# Patient Record
Sex: FEMALE | Race: WHITE | NOT HISPANIC OR LATINO | Employment: OTHER | URBAN - METROPOLITAN AREA
[De-identification: names, ages, dates, MRNs, and addresses within clinical notes are randomized per-mention and may not be internally consistent; named-entity substitution may affect disease eponyms.]

---

## 2018-08-02 ENCOUNTER — APPOINTMENT (INPATIENT)
Dept: RADIOLOGY | Facility: HOSPITAL | Age: 83
DRG: 534 | End: 2018-08-02
Payer: COMMERCIAL

## 2018-08-02 ENCOUNTER — APPOINTMENT (EMERGENCY)
Dept: RADIOLOGY | Facility: HOSPITAL | Age: 83
End: 2018-08-02
Payer: COMMERCIAL

## 2018-08-02 ENCOUNTER — APPOINTMENT (EMERGENCY)
Dept: CT IMAGING | Facility: HOSPITAL | Age: 83
End: 2018-08-02
Payer: COMMERCIAL

## 2018-08-02 ENCOUNTER — HOSPITAL ENCOUNTER (INPATIENT)
Facility: HOSPITAL | Age: 83
LOS: 7 days | Discharge: NURSING, REHAB OR LONG-TERM CARE | DRG: 534 | End: 2018-08-09
Attending: GENERAL PRACTICE | Admitting: GENERAL PRACTICE
Payer: COMMERCIAL

## 2018-08-02 ENCOUNTER — HOSPITAL ENCOUNTER (EMERGENCY)
Facility: HOSPITAL | Age: 83
End: 2018-08-02
Attending: EMERGENCY MEDICINE | Admitting: EMERGENCY MEDICINE
Payer: COMMERCIAL

## 2018-08-02 VITALS
RESPIRATION RATE: 20 BRPM | OXYGEN SATURATION: 93 % | WEIGHT: 121.03 LBS | TEMPERATURE: 98.5 F | DIASTOLIC BLOOD PRESSURE: 61 MMHG | HEART RATE: 82 BPM | SYSTOLIC BLOOD PRESSURE: 126 MMHG | BODY MASS INDEX: 21.44 KG/M2

## 2018-08-02 DIAGNOSIS — Z96.649 PERIPROSTHETIC FRACTURE OF SHAFT OF FEMUR: Primary | ICD-10-CM
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
## 2018-08-02 DIAGNOSIS — E87.1 HYPONATREMIA: ICD-10-CM

## 2018-08-02 DIAGNOSIS — I35.0 SEVERE AORTIC STENOSIS: ICD-10-CM

## 2018-08-02 DIAGNOSIS — F41.9 ANXIETY: ICD-10-CM

## 2018-08-02 DIAGNOSIS — M97.8XXA PERIPROSTHETIC FRACTURE OF SHAFT OF FEMUR: Primary | ICD-10-CM

## 2018-08-02 DIAGNOSIS — S72.401A CLOSED FRACTURE OF DISTAL END OF RIGHT FEMUR, UNSPECIFIED FRACTURE MORPHOLOGY, INITIAL ENCOUNTER (HCC): Primary | ICD-10-CM

## 2018-08-02 PROBLEM — R19.7 DIARRHEA: Status: ACTIVE | Noted: 2018-08-02

## 2018-08-02 PROBLEM — S72.90XA FEMORAL FRACTURE (HCC): Status: ACTIVE | Noted: 2018-08-02

## 2018-08-02 PROBLEM — M79.641 HAND PAIN, RIGHT: Status: ACTIVE | Noted: 2018-08-02

## 2018-08-02 PROBLEM — I10 HYPERTENSION: Status: ACTIVE | Noted: 2018-08-02

## 2018-08-02 LAB
ABO GROUP BLD: NORMAL
ALBUMIN SERPL BCP-MCNC: 3.6 G/DL (ref 3.5–5)
ALP SERPL-CCNC: 74 U/L (ref 46–116)
ALT SERPL W P-5'-P-CCNC: 18 U/L (ref 12–78)
ANION GAP SERPL CALCULATED.3IONS-SCNC: 5 MMOL/L (ref 4–13)
APTT PPP: 26 SECONDS (ref 24–36)
AST SERPL W P-5'-P-CCNC: 21 U/L (ref 5–45)
ATRIAL RATE: 70 BPM
BASOPHILS # BLD AUTO: 0.05 THOUSANDS/ΜL (ref 0–0.1)
BASOPHILS NFR BLD AUTO: 1 % (ref 0–1)
BILIRUB SERPL-MCNC: 0.3 MG/DL (ref 0.2–1)
BLD GP AB SCN SERPL QL: NEGATIVE
BUN SERPL-MCNC: 13 MG/DL (ref 5–25)
CALCIUM SERPL-MCNC: 9.1 MG/DL (ref 8.3–10.1)
CHLORIDE SERPL-SCNC: 95 MMOL/L (ref 100–108)
CO2 SERPL-SCNC: 28 MMOL/L (ref 21–32)
CREAT SERPL-MCNC: 0.55 MG/DL (ref 0.6–1.3)
EOSINOPHIL # BLD AUTO: 0.23 THOUSAND/ΜL (ref 0–0.61)
EOSINOPHIL NFR BLD AUTO: 2 % (ref 0–6)
ERYTHROCYTE [DISTWIDTH] IN BLOOD BY AUTOMATED COUNT: 14.9 % (ref 11.6–15.1)
GFR SERPL CREATININE-BSD FRML MDRD: 83 ML/MIN/1.73SQ M
GLUCOSE SERPL-MCNC: 99 MG/DL (ref 65–140)
HCT VFR BLD AUTO: 34.2 % (ref 34.8–46.1)
HGB BLD-MCNC: 11.6 G/DL (ref 11.5–15.4)
IMM GRANULOCYTES # BLD AUTO: 0.04 THOUSAND/UL (ref 0–0.2)
IMM GRANULOCYTES NFR BLD AUTO: 0 % (ref 0–2)
INR PPP: 1.02 (ref 0.86–1.17)
LYMPHOCYTES # BLD AUTO: 2.16 THOUSANDS/ΜL (ref 0.6–4.47)
LYMPHOCYTES NFR BLD AUTO: 23 % (ref 14–44)
MCH RBC QN AUTO: 29.7 PG (ref 26.8–34.3)
MCHC RBC AUTO-ENTMCNC: 33.9 G/DL (ref 31.4–37.4)
MCV RBC AUTO: 88 FL (ref 82–98)
MONOCYTES # BLD AUTO: 0.82 THOUSAND/ΜL (ref 0.17–1.22)
MONOCYTES NFR BLD AUTO: 9 % (ref 4–12)
NEUTROPHILS # BLD AUTO: 6.24 THOUSANDS/ΜL (ref 1.85–7.62)
NEUTS SEG NFR BLD AUTO: 65 % (ref 43–75)
NRBC BLD AUTO-RTO: 0 /100 WBCS
OSMOLALITY UR/SERPL-RTO: 268 MMOL/KG (ref 282–298)
P AXIS: 46 DEGREES
PLATELET # BLD AUTO: 294 THOUSANDS/UL (ref 149–390)
PLATELET # BLD AUTO: 308 THOUSANDS/UL (ref 149–390)
PMV BLD AUTO: 9.5 FL (ref 8.9–12.7)
PMV BLD AUTO: 9.5 FL (ref 8.9–12.7)
POTASSIUM SERPL-SCNC: 4.6 MMOL/L (ref 3.5–5.3)
PR INTERVAL: 132 MS
PROT SERPL-MCNC: 7.3 G/DL (ref 6.4–8.2)
PROTHROMBIN TIME: 13.3 SECONDS (ref 11.8–14.2)
QRS AXIS: -60 DEGREES
QRSD INTERVAL: 90 MS
QT INTERVAL: 420 MS
QTC INTERVAL: 453 MS
RBC # BLD AUTO: 3.9 MILLION/UL (ref 3.81–5.12)
RH BLD: POSITIVE
SODIUM SERPL-SCNC: 128 MMOL/L (ref 136–145)
SPECIMEN EXPIRATION DATE: NORMAL
T WAVE AXIS: 80 DEGREES
TROPONIN I SERPL-MCNC: <0.02 NG/ML
VENTRICULAR RATE: 70 BPM
WBC # BLD AUTO: 9.54 THOUSAND/UL (ref 4.31–10.16)

## 2018-08-02 PROCEDURE — 73552 X-RAY EXAM OF FEMUR 2/>: CPT

## 2018-08-02 PROCEDURE — 85049 AUTOMATED PLATELET COUNT: CPT | Performed by: PHYSICIAN ASSISTANT

## 2018-08-02 PROCEDURE — 93010 ELECTROCARDIOGRAM REPORT: CPT | Performed by: INTERNAL MEDICINE

## 2018-08-02 PROCEDURE — 74176 CT ABD & PELVIS W/O CONTRAST: CPT

## 2018-08-02 PROCEDURE — 96375 TX/PRO/DX INJ NEW DRUG ADDON: CPT

## 2018-08-02 PROCEDURE — 83930 ASSAY OF BLOOD OSMOLALITY: CPT | Performed by: PHYSICIAN ASSISTANT

## 2018-08-02 PROCEDURE — 86901 BLOOD TYPING SEROLOGIC RH(D): CPT | Performed by: EMERGENCY MEDICINE

## 2018-08-02 PROCEDURE — 71045 X-RAY EXAM CHEST 1 VIEW: CPT

## 2018-08-02 PROCEDURE — 96374 THER/PROPH/DIAG INJ IV PUSH: CPT

## 2018-08-02 PROCEDURE — 72125 CT NECK SPINE W/O DYE: CPT

## 2018-08-02 PROCEDURE — 85730 THROMBOPLASTIN TIME PARTIAL: CPT | Performed by: EMERGENCY MEDICINE

## 2018-08-02 PROCEDURE — 99285 EMERGENCY DEPT VISIT HI MDM: CPT

## 2018-08-02 PROCEDURE — 73130 X-RAY EXAM OF HAND: CPT

## 2018-08-02 PROCEDURE — 86900 BLOOD TYPING SEROLOGIC ABO: CPT | Performed by: EMERGENCY MEDICINE

## 2018-08-02 PROCEDURE — 86850 RBC ANTIBODY SCREEN: CPT | Performed by: EMERGENCY MEDICINE

## 2018-08-02 PROCEDURE — 85610 PROTHROMBIN TIME: CPT | Performed by: EMERGENCY MEDICINE

## 2018-08-02 PROCEDURE — 36415 COLL VENOUS BLD VENIPUNCTURE: CPT | Performed by: EMERGENCY MEDICINE

## 2018-08-02 PROCEDURE — 72170 X-RAY EXAM OF PELVIS: CPT

## 2018-08-02 PROCEDURE — 85025 COMPLETE CBC W/AUTO DIFF WBC: CPT | Performed by: EMERGENCY MEDICINE

## 2018-08-02 PROCEDURE — 73560 X-RAY EXAM OF KNEE 1 OR 2: CPT

## 2018-08-02 PROCEDURE — 84484 ASSAY OF TROPONIN QUANT: CPT | Performed by: EMERGENCY MEDICINE

## 2018-08-02 PROCEDURE — 80053 COMPREHEN METABOLIC PANEL: CPT | Performed by: EMERGENCY MEDICINE

## 2018-08-02 PROCEDURE — 70450 CT HEAD/BRAIN W/O DYE: CPT

## 2018-08-02 PROCEDURE — 71250 CT THORAX DX C-: CPT

## 2018-08-02 PROCEDURE — 99223 1ST HOSP IP/OBS HIGH 75: CPT | Performed by: GENERAL PRACTICE

## 2018-08-02 PROCEDURE — 93005 ELECTROCARDIOGRAM TRACING: CPT

## 2018-08-02 RX ORDER — SODIUM CHLORIDE 9 MG/ML
100 INJECTION, SOLUTION INTRAVENOUS CONTINUOUS
Status: DISCONTINUED | OUTPATIENT
Start: 2018-08-02 | End: 2018-08-03

## 2018-08-02 RX ORDER — AMLODIPINE BESYLATE 10 MG/1
10 TABLET ORAL DAILY
Status: DISCONTINUED | OUTPATIENT
Start: 2018-08-03 | End: 2018-08-09 | Stop reason: HOSPADM

## 2018-08-02 RX ORDER — CALCIUM CARBONATE 200(500)MG
1000 TABLET,CHEWABLE ORAL DAILY PRN
Status: DISCONTINUED | OUTPATIENT
Start: 2018-08-02 | End: 2018-08-09 | Stop reason: HOSPADM

## 2018-08-02 RX ORDER — AMLODIPINE BESYLATE 10 MG/1
10 TABLET ORAL DAILY
Status: DISCONTINUED | OUTPATIENT
Start: 2018-08-03 | End: 2018-08-02

## 2018-08-02 RX ORDER — MORPHINE SULFATE 4 MG/ML
4 INJECTION, SOLUTION INTRAMUSCULAR; INTRAVENOUS ONCE
Status: COMPLETED | OUTPATIENT
Start: 2018-08-02 | End: 2018-08-02

## 2018-08-02 RX ORDER — SERTRALINE HYDROCHLORIDE 25 MG/1
50 TABLET, FILM COATED ORAL DAILY
COMMUNITY
End: 2018-09-20 | Stop reason: CLARIF

## 2018-08-02 RX ORDER — ALPRAZOLAM 0.5 MG/1
0.5 TABLET ORAL
Status: DISCONTINUED | OUTPATIENT
Start: 2018-08-02 | End: 2018-08-03

## 2018-08-02 RX ORDER — FENTANYL CITRATE 50 UG/ML
50 INJECTION, SOLUTION INTRAMUSCULAR; INTRAVENOUS ONCE
Status: COMPLETED | OUTPATIENT
Start: 2018-08-02 | End: 2018-08-02

## 2018-08-02 RX ORDER — AMLODIPINE AND VALSARTAN 5; 320 MG/1; MG/1
1 TABLET ORAL DAILY
COMMUNITY
End: 2018-09-20 | Stop reason: CLARIF

## 2018-08-02 RX ORDER — OXYCODONE HYDROCHLORIDE 5 MG/1
5 TABLET ORAL EVERY 4 HOURS PRN
Status: DISCONTINUED | OUTPATIENT
Start: 2018-08-02 | End: 2018-08-03

## 2018-08-02 RX ORDER — OXYCODONE HYDROCHLORIDE 5 MG/1
2.5 TABLET ORAL EVERY 4 HOURS PRN
Status: DISCONTINUED | OUTPATIENT
Start: 2018-08-02 | End: 2018-08-03

## 2018-08-02 RX ORDER — ALPRAZOLAM 0.5 MG/1
0.5 TABLET ORAL
Status: ON HOLD | COMMUNITY
End: 2018-08-09

## 2018-08-02 RX ORDER — ACETAMINOPHEN 325 MG/1
975 TABLET ORAL EVERY 8 HOURS SCHEDULED
Status: DISCONTINUED | OUTPATIENT
Start: 2018-08-02 | End: 2018-08-09 | Stop reason: HOSPADM

## 2018-08-02 RX ORDER — ONDANSETRON 2 MG/ML
4 INJECTION INTRAMUSCULAR; INTRAVENOUS EVERY 6 HOURS PRN
Status: DISCONTINUED | OUTPATIENT
Start: 2018-08-02 | End: 2018-08-09 | Stop reason: HOSPADM

## 2018-08-02 RX ADMIN — FENTANYL CITRATE 50 MCG: 50 INJECTION, SOLUTION INTRAMUSCULAR; INTRAVENOUS at 10:50

## 2018-08-02 RX ADMIN — OXYCODONE HYDROCHLORIDE 2.5 MG: 5 TABLET ORAL at 20:55

## 2018-08-02 RX ADMIN — HYDROMORPHONE HYDROCHLORIDE 0.2 MG: 1 INJECTION, SOLUTION INTRAMUSCULAR; INTRAVENOUS; SUBCUTANEOUS at 15:16

## 2018-08-02 RX ADMIN — MORPHINE SULFATE 4 MG: 4 INJECTION INTRAVENOUS at 17:11

## 2018-08-02 RX ADMIN — SODIUM CHLORIDE 100 ML/HR: 0.9 INJECTION, SOLUTION INTRAVENOUS at 21:05

## 2018-08-02 RX ADMIN — ACETAMINOPHEN 975 MG: 325 TABLET ORAL at 21:04

## 2018-08-02 RX ADMIN — MORPHINE SULFATE 4 MG: 4 INJECTION INTRAVENOUS at 11:33

## 2018-08-02 RX ADMIN — ALPRAZOLAM 0.5 MG: 0.5 TABLET ORAL at 22:26

## 2018-08-02 NOTE — ED NOTES
Patient unable to straighten knee for splint placement/stabilization  Physician aware  Knee immobilizer applied to right knee       Pantera Edward RN  08/02/18 3687

## 2018-08-02 NOTE — ED NOTES
Attempted calling report to P9 - charge nurse unable to verify who will be receiving patient and will return call       Milly Kaur RN  08/02/18 8921

## 2018-08-02 NOTE — EMTALA/ACUTE CARE TRANSFER
[address] [address]  Dept: [address]    NAME Syed iMjares                                         1928                              MRN [id number]    I have been informed of my rights regarding examination, treatment, and transfer   by Dr Golden Vang MD    Benefits: Specialized equipment and/or services available at the receiving facility (Include comment)________________________    Risks: Potential for delay in receiving treatment, Potential deterioration of medical condition, Loss of IV, Increased discomfort during transfer, Possible worsening of condition or death during transfer      Consent for Transfer:  I acknowledge that my medical condition has been evaluated and explained to me by the emergency department physician or other qualified medical person and/or my attending physician, who has recommended that I be transferred to the service of  Accepting Physician: Dr Domingo Cason at [facility] Name, Michifcaitlynofelia [address] : [facility]  The above potential benefits of such transfer, the potential risks associated with such transfer, and the probable risks of not being transferred have been explained to me, and I fully understand them  The doctor has explained that, in my case, the benefits of transfer outweigh the risks  I agree to be transferred  I authorize the performance of emergency medical procedures and treatments upon me in both transit and upon arrival at the receiving facility  Additionally, I authorize the release of any and all medical records to the receiving facility and request they be transported with me, if possible  I understand that the safest mode of transportation during a medical emergency is an ambulance and that the Hospital advocates the use of this mode of transport   Risks of traveling to the receiving facility by car, including absence of medical control, life sustaining equipment, such as oxygen, and medical personnel has been explained to me and I fully understand them  (SARAHY CORRECT BOX BELOW)  [  ]  I consent to the stated transfer and to be transported by ambulance/helicopter  [  ]  I consent to the stated transfer, but refuse transportation by ambulance and accept full responsibility for my transportation by car  I understand the risks of non-ambulance transfers and I exonerate the Hospital and its staff from any deterioration in my condition that results from this refusal     X___________________________________________    DATE  18  TIME________  Signature of patient or legally responsible individual signing on patient behalf           RELATIONSHIP TO PATIENT_________________________          Provider Certification    NAME [address] [place] [address] 1928                              MRN [id number]    A medical screening exam was performed on the above named patient  Based on the examination:    Condition Necessitating Transfer There were no encounter diagnoses      Patient Condition: The patient has been stabilized such that within reasonable medical probability, no material deterioration of the patient condition or the condition of the unborn child(kofi) is likely to result from the transfer    Reason for Transfer: Level of Care needed not available at this facility    Transfer Requirements: Facility [facility]   · Space available and qualified personnel available for treatment as acknowledged by    · Agreed to accept transfer and to provide appropriate medical treatment as acknowledged by       Dr Giovanni Mckeon  · Appropriate medical records of the examination and treatment of the patient are provided at the time of transfer   [facility], [address] _______  · Transfer will be performed by qualified personnel from    and appropriate transfer equipment as required, including the use of necessary and appropriate life support measures  Provider Certification: I have examined the patient and explained the following risks and benefits of being transferred/refusing transfer to the patient/family:  General risk, such as traffic hazards, adverse weather conditions, rough terrain or turbulence, possible failure of equipment (including vehicle or aircraft), or consequences of actions of persons outside the control of the transport personnel      Based on these reasonable risks and benefits to the patient and/or the unborn child(kofi), and based upon the information available at the time of the patients examination, I certify that the medical benefits reasonably to be expected from the provision of appropriate medical treatments at another medical facility outweigh the increasing risks, if any, to the individuals medical condition, and in the case of labor to the unborn child, from effecting the transfer      X____________________________________________ DATE 08/02/18        TIME_______      ORIGINAL - SEND TO MEDICAL RECORDS   COPY - SEND WITH PATIENT DURING TRANSFER

## 2018-08-02 NOTE — ED PROVIDER NOTES
History  Chief Complaint   Patient presents with   [address]     pt fell at the doctors office this morning     Unwitnessed fall from standing in parking lot while attempting to go to PCP to establish care  New to the area  Simona Damon struck ground w head and R hip with immediate onset of R hip pain which is worse with movement and better when lying still  Pain constant, nonradiating, aching  No HA or vomiting or AMS since fall  No cp or sob since fall  Had been feeling well prior to fall but did have diarrhea this morning  Currently symptomatic  None       Past Medical History:   Diagnosis Date    Hypertension     Psychiatric disorder     anxiety       Past Surgical History:   Procedure Laterality Date     SECTION      CHOLECYSTECTOMY      REPLACEMENT TOTAL KNEE         History reviewed  No pertinent family history  I have reviewed and agree with the history as documented  Social History   Substance Use Topics    Smoking status: Never Smoker    Smokeless tobacco: Never Used    Alcohol use No        Review of Systems   Constitutional: Negative  Gastrointestinal: Positive for diarrhea  Musculoskeletal: Negative for arthralgias, back pain, gait problem and joint swelling  All other systems reviewed and are negative  Physical Exam  Physical Exam   Constitutional: She appears well-developed and well-nourished  No distress  Skin: She is not diaphoretic  Nursing note and vitals reviewed        Vital Signs  ED Triage Vitals   Temperature Pulse Respirations Blood Pressure SpO2   18 1059 18 1059 18 1059 18 1106 18 1106   98 5 °F (36 9 °C) 74 20 119/56 93 %      Temp Source Heart Rate Source Patient Position - Orthostatic VS BP Location FiO2 (%)   18 1059 18 1059 18 1106 18 1106 --   Oral Monitor Lying Right arm       Pain Score       18 1050       Worst Possible Pain           Vitals:    18 1106 18 1130 08/02/18 1312 08/02/18 1518   BP: 119/56 122/59 126/58 133/66   Pulse:  70 73 79   Patient Position - Orthostatic VS: Lying Lying Lying Lying       Visual Acuity  Visual Acuity      Most Recent Value   L Pupil Size (mm)  3   R Pupil Size (mm)  3          ED Medications  Medications   fentanyl citrate (PF) 100 MCG/2ML 50 mcg (50 mcg Intravenous Given 8/2/18 1050)   morphine (PF) 4 mg/mL injection 4 mg (4 mg Intravenous Given 8/2/18 1133)   HYDROmorphone (DILAUDID) injection 0 2 mg (0 2 mg Intravenous Given 8/2/18 1516)       Diagnostic Studies  Results Reviewed     Procedure Component Value Units Date/Time    Troponin I [[id number]]  (Normal) Collected:  08/02/18 1111    Lab Status:  Final result Specimen:  Blood from Arm, Left Updated:  08/02/18 1139     Troponin I <0 02 ng/mL     Comprehensive metabolic panel [[id number]]  (Abnormal) Collected:  08/02/18 1111    Lab Status:  Final result Specimen:  Blood from Arm, Left Updated:  08/02/18 1136     Sodium 128 (L) mmol/L      Potassium 4 6 mmol/L      Chloride 95 (L) mmol/L      CO2 28 mmol/L      Anion Gap 5 mmol/L      BUN 13 mg/dL      Creatinine 0 55 (L) mg/dL      Glucose 99 mg/dL      Calcium 9 1 mg/dL      AST 21 U/L      ALT 18 U/L      Alkaline Phosphatase 74 U/L      Total Protein 7 3 g/dL      Albumin 3 6 g/dL      Total Bilirubin 0 30 mg/dL      eGFR 83 ml/min/1 73sq m     Narrative:         National Kidney Disease Education Program recommendations are as follows:  GFR calculation is accurate only with a steady state creatinine  Chronic Kidney disease less than 60 ml/min/1 73 sq  meters  Kidney failure less than 15 ml/min/1 73 sq  meters      Luisana Kang [[id number]]  (Normal) Collected:  08/02/18 1111    Lab Status:  Final result Specimen:  Blood from Arm, Left Updated:  08/02/18 1130     Protime 13 3 seconds      INR 1 02    APTT [[id number]]  (Normal) Collected:  08/02/18 1111    Lab Status:  Final result Specimen:  Blood from Arm, Left Updated:  08/02/18 1130 PTT 26 seconds     CBC and differential [[id number]]  (Abnormal) Collected:  08/02/18 1111    Lab Status:  Final result Specimen:  Blood from Arm, Left Updated:  08/02/18 1123     WBC 9 54 Thousand/uL      RBC 3 90 Million/uL      Hemoglobin 11 6 g/dL      Hematocrit 34 2 (L) %      MCV 88 fL      MCH 29 7 pg      MCHC 33 9 g/dL      RDW 14 9 %      MPV 9 5 fL      Platelets 621 Thousands/uL      nRBC 0 /100 WBCs      Neutrophils Relative 65 %      Immat GRANS % 0 %      Lymphocytes Relative 23 %      Monocytes Relative 9 %      Eosinophils Relative 2 %      Basophils Relative 1 %      Neutrophils Absolute 6 24 Thousands/µL      Immature Grans Absolute 0 04 Thousand/uL      Lymphocytes Absolute 2 16 Thousands/µL      Monocytes Absolute 0 82 Thousand/µL      Eosinophils Absolute 0 23 Thousand/µL      Basophils Absolute 0 05 Thousands/µL                  XR femur 2 views RIGHT   Final Result by Tyrone Stacy DO (08/02 1443)      Minimally displaced, medial distal femoral periprosthetic fracture  Negative for mid to proximal femoral fracture  Workstation performed: [id number]         XR knee 1 or 2 views right   Final Result by Shon Buerger, MD (08/02 1257)      Acute distal femoral periprosthetic fracture with mild angulation and small joint effusion  Workstation performed: [id number]         CT chest abdomen pelvis wo contrast   Final Result by Artem Lott DO (08/02 1203)   1  Limited examination due to patient motion artifact and patient positioning  2   Limited examination due to lack of oral and intravenous contrast material   This allows for suboptimal visualization of solid and viscus organs  3   No traumatic solid or visceral organ injury, however intravenous contrast material not administered  4   Colonic diverticulosis  5   Degenerative changes of the thoracolumbar spine, bilateral shoulders and bilateral hips        Workstation performed: [id number]         CT spine cervical without contrast   Final Result by Annie Moses DO (08/02 1150)   1  Degenerative and congenital changes  No cervical spine fracture or traumatic malalignment  2   Degenerative versus postoperative fusion C5-C6  Workstation performed: [id number]         XR pelvis ap only 1 or 2 vw   Final Result by Arina Briggs MD (08/02 1141)      No acute displaced fracture seen   Intact pelvic ring         Workstation performed: [id number]         CT head without contrast   Final Result by Annie Moses DO (08/02 1141)   1  Limited examination due to patient motion  2   Moderate cerebral atrophy with chronic small vessel ischemic change  No acute intracranial abnormality  Workstation performed: [id number]                    Procedures  ECG 12 Lead Documentation  Date/Time: 8/2/2018 11:26 AM  Performed by: Mikel Barrios  Authorized by: Abram CACERES     Indications / Diagnosis:  Fall  ECG reviewed by me, the ED Provider: yes    Patient location:  ED  Rate:     ECG rate:  70  Rhythm:     Rhythm: sinus rhythm    Comments:      LAD, RBBB  No acute ischemic changes  nondiagnostic EKG  Phone Contacts  ED Phone Contact    ED Course  ED Course as of Aug 02 1636   Thu Aug 02, 2018   1228 XR knee 1 or 2 views right                               MDM  CritCare Time    Disposition  Final diagnoses:   Periprosthetic fracture of shaft of femur   Fall from standing, initial encounter     Time reflects when diagnosis was documented in both MDM as applicable and the Disposition within this note     Time User Action Codes Description Comment    8/2/2018  3:51 PM Lamin Coronage  8XXA,  [id number] [id number]] Periprosthetic fracture of shaft of femur     8/2/2018  3:51 PM Linus Mayorga Add [W19  XXXA] Fall from standing, initial encounter       ED Disposition     ED Disposition Condition Comment    Transfer to [facility] should be transferred [facility] to [facility] under the care of Dr Bibiana Jack MD Documentation      Most Recent Value   Patient Condition  The patient has been stabilized such that within reasonable medical probability, no material deterioration of the patient condition or the condition of the unborn child(kofi) is likely to result from the transfer   Reason for Transfer  Level of Care needed not available at this facility   Benefits of Transfer  Specialized equipment and/or services available at the receiving facility (Include comment)________________________   Risks of Transfer  Potential for delay in receiving treatment, Potential deterioration of medical condition, Loss of IV, Increased discomfort during transfer, Possible worsening of condition or death during transfer   Accepting Physician  Dr Vasquez Torres Name, Vincent Acharya   Provider Certification  General risk, such as traffic hazards, adverse weather conditions, rough terrain or turbulence, possible failure of equipment (including vehicle or aircraft), or consequences of actions of persons outside the control of the transport personnel      RN Documentation      Most [facility] Name, Vincent Ohara   Bed Assignment  911      Follow-up Information    None         Patient's Medications    No medications on file     No discharge procedures on file      ED Provider  Electronically Signed by           Saeed Pedraza MD  08/02/18 6499       Saeed Pedraza MD  08/02/18 6610

## 2018-08-02 NOTE — ED NOTES
Second call to P9 to give report, nurse unable to take report at this time  Asked to call back nurse in 20 minutes  Nurse told that calling from ED and unsure if time would be allowed to call a third time       Anna Arugeta RN  08/02/18 1580

## 2018-08-03 ENCOUNTER — APPOINTMENT (INPATIENT)
Dept: RADIOLOGY | Facility: HOSPITAL | Age: 83
DRG: 534 | End: 2018-08-03
Payer: COMMERCIAL

## 2018-08-03 ENCOUNTER — APPOINTMENT (INPATIENT)
Dept: NON INVASIVE DIAGNOSTICS | Facility: HOSPITAL | Age: 83
DRG: 534 | End: 2018-08-03
Payer: COMMERCIAL

## 2018-08-03 LAB
ANION GAP SERPL CALCULATED.3IONS-SCNC: 7 MMOL/L (ref 4–13)
BASOPHILS # BLD AUTO: 0.02 THOUSANDS/ΜL (ref 0–0.1)
BASOPHILS NFR BLD AUTO: 0 % (ref 0–1)
BUN SERPL-MCNC: 8 MG/DL (ref 5–25)
CALCIUM SERPL-MCNC: 8.3 MG/DL (ref 8.3–10.1)
CHLORIDE SERPL-SCNC: 95 MMOL/L (ref 100–108)
CO2 SERPL-SCNC: 27 MMOL/L (ref 21–32)
CREAT SERPL-MCNC: 0.46 MG/DL (ref 0.6–1.3)
EOSINOPHIL # BLD AUTO: 0.31 THOUSAND/ΜL (ref 0–0.61)
EOSINOPHIL NFR BLD AUTO: 2 % (ref 0–6)
ERYTHROCYTE [DISTWIDTH] IN BLOOD BY AUTOMATED COUNT: 15.2 % (ref 11.6–15.1)
GFR SERPL CREATININE-BSD FRML MDRD: 88 ML/MIN/1.73SQ M
GLUCOSE SERPL-MCNC: 106 MG/DL (ref 65–140)
HCT VFR BLD AUTO: 32.3 % (ref 34.8–46.1)
HGB BLD-MCNC: 10.8 G/DL (ref 11.5–15.4)
IMM GRANULOCYTES # BLD AUTO: 0.06 THOUSAND/UL (ref 0–0.2)
IMM GRANULOCYTES NFR BLD AUTO: 0 % (ref 0–2)
LYMPHOCYTES # BLD AUTO: 1.92 THOUSANDS/ΜL (ref 0.6–4.47)
LYMPHOCYTES NFR BLD AUTO: 12 % (ref 14–44)
MCH RBC QN AUTO: 30.2 PG (ref 26.8–34.3)
MCHC RBC AUTO-ENTMCNC: 33.4 G/DL (ref 31.4–37.4)
MCV RBC AUTO: 90 FL (ref 82–98)
MONOCYTES # BLD AUTO: 0.91 THOUSAND/ΜL (ref 0.17–1.22)
MONOCYTES NFR BLD AUTO: 6 % (ref 4–12)
NEUTROPHILS # BLD AUTO: 12.97 THOUSANDS/ΜL (ref 1.85–7.62)
NEUTS SEG NFR BLD AUTO: 80 % (ref 43–75)
NRBC BLD AUTO-RTO: 0 /100 WBCS
OSMOLALITY UR: 623 MMOL/KG
PLATELET # BLD AUTO: 290 THOUSANDS/UL (ref 149–390)
PMV BLD AUTO: 9.8 FL (ref 8.9–12.7)
POTASSIUM SERPL-SCNC: 3.7 MMOL/L (ref 3.5–5.3)
RBC # BLD AUTO: 3.58 MILLION/UL (ref 3.81–5.12)
SODIUM 24H UR-SCNC: 91 MOL/L
SODIUM SERPL-SCNC: 129 MMOL/L (ref 136–145)
TSH SERPL DL<=0.05 MIU/L-ACNC: 1.53 UIU/ML (ref 0.36–3.74)
WBC # BLD AUTO: 16.19 THOUSAND/UL (ref 4.31–10.16)

## 2018-08-03 PROCEDURE — 73700 CT LOWER EXTREMITY W/O DYE: CPT

## 2018-08-03 PROCEDURE — 85025 COMPLETE CBC W/AUTO DIFF WBC: CPT | Performed by: PHYSICIAN ASSISTANT

## 2018-08-03 PROCEDURE — 99222 1ST HOSP IP/OBS MODERATE 55: CPT | Performed by: ORTHOPAEDIC SURGERY

## 2018-08-03 PROCEDURE — 93306 TTE W/DOPPLER COMPLETE: CPT | Performed by: INTERNAL MEDICINE

## 2018-08-03 PROCEDURE — 99232 SBSQ HOSP IP/OBS MODERATE 35: CPT | Performed by: NURSE PRACTITIONER

## 2018-08-03 PROCEDURE — 84443 ASSAY THYROID STIM HORMONE: CPT | Performed by: GENERAL PRACTICE

## 2018-08-03 PROCEDURE — 99221 1ST HOSP IP/OBS SF/LOW 40: CPT | Performed by: INTERNAL MEDICINE

## 2018-08-03 PROCEDURE — 73560 X-RAY EXAM OF KNEE 1 OR 2: CPT

## 2018-08-03 PROCEDURE — 83935 ASSAY OF URINE OSMOLALITY: CPT | Performed by: PHYSICIAN ASSISTANT

## 2018-08-03 PROCEDURE — 80048 BASIC METABOLIC PNL TOTAL CA: CPT | Performed by: PHYSICIAN ASSISTANT

## 2018-08-03 PROCEDURE — 93306 TTE W/DOPPLER COMPLETE: CPT

## 2018-08-03 PROCEDURE — 84300 ASSAY OF URINE SODIUM: CPT | Performed by: PHYSICIAN ASSISTANT

## 2018-08-03 RX ORDER — SENNOSIDES 8.6 MG
1 TABLET ORAL
Status: DISCONTINUED | OUTPATIENT
Start: 2018-08-03 | End: 2018-08-08

## 2018-08-03 RX ORDER — GABAPENTIN 100 MG/1
100 CAPSULE ORAL
Status: DISCONTINUED | OUTPATIENT
Start: 2018-08-03 | End: 2018-08-07

## 2018-08-03 RX ORDER — OXYCODONE HYDROCHLORIDE 5 MG/1
5 TABLET ORAL 4 TIMES DAILY
Status: DISCONTINUED | OUTPATIENT
Start: 2018-08-03 | End: 2018-08-09 | Stop reason: HOSPADM

## 2018-08-03 RX ORDER — SODIUM CHLORIDE 9 MG/ML
50 INJECTION, SOLUTION INTRAVENOUS CONTINUOUS
Status: DISCONTINUED | OUTPATIENT
Start: 2018-08-03 | End: 2018-08-04

## 2018-08-03 RX ORDER — ALPRAZOLAM 0.25 MG/1
0.12 TABLET ORAL 3 TIMES DAILY
Status: DISCONTINUED | OUTPATIENT
Start: 2018-08-03 | End: 2018-08-09 | Stop reason: HOSPADM

## 2018-08-03 RX ADMIN — ACETAMINOPHEN 975 MG: 325 TABLET ORAL at 14:01

## 2018-08-03 RX ADMIN — HYDROMORPHONE HYDROCHLORIDE 0.2 MG: 1 INJECTION, SOLUTION INTRAMUSCULAR; INTRAVENOUS; SUBCUTANEOUS at 14:01

## 2018-08-03 RX ADMIN — ACETAMINOPHEN 975 MG: 325 TABLET ORAL at 05:01

## 2018-08-03 RX ADMIN — SENNOSIDES 8.6 MG: 8.6 TABLET, FILM COATED ORAL at 21:38

## 2018-08-03 RX ADMIN — OXYCODONE HYDROCHLORIDE 5 MG: 5 TABLET ORAL at 18:14

## 2018-08-03 RX ADMIN — ALPRAZOLAM 0.12 MG: 0.25 TABLET ORAL at 16:08

## 2018-08-03 RX ADMIN — HYDROMORPHONE HYDROCHLORIDE 0.2 MG: 1 INJECTION, SOLUTION INTRAMUSCULAR; INTRAVENOUS; SUBCUTANEOUS at 06:16

## 2018-08-03 RX ADMIN — GABAPENTIN 100 MG: 100 CAPSULE ORAL at 21:38

## 2018-08-03 RX ADMIN — OXYCODONE HYDROCHLORIDE 5 MG: 5 TABLET ORAL at 11:49

## 2018-08-03 RX ADMIN — SERTRALINE HYDROCHLORIDE 50 MG: 50 TABLET ORAL at 08:35

## 2018-08-03 RX ADMIN — OXYCODONE HYDROCHLORIDE 5 MG: 5 TABLET ORAL at 08:35

## 2018-08-03 RX ADMIN — SODIUM CHLORIDE 100 ML/HR: 0.9 INJECTION, SOLUTION INTRAVENOUS at 04:34

## 2018-08-03 RX ADMIN — OXYCODONE HYDROCHLORIDE 2.5 MG: 5 TABLET ORAL at 04:32

## 2018-08-03 RX ADMIN — HYDROMORPHONE HYDROCHLORIDE 0.2 MG: 1 INJECTION, SOLUTION INTRAMUSCULAR; INTRAVENOUS; SUBCUTANEOUS at 09:45

## 2018-08-03 NOTE — SOCIAL WORK
Cm attempted to meet with patient to review role of Cm  Patient presented as confused, but patient's son was at bedside  Per son Nikita Boo, [phone] reported that patient is generally alert and oriented  Son reported that patient lives in a ranch style home with him and his wife Ana Clemons  Patient reportedly uses a RW in the home and a cane for short community distances  Patient has home aides through [employer] twice a week to assist with bathing and dressing  Patient's son denied patient every going to inpatient rehab, inpatient , substance abuse or having [address] [place] [address] services  Patient has a transfer chair for the shower at home  Patient's preferred pharmacy is [facility]  Patient's PCP is through [facility]  CM reviewed d/c planning process including the following: identifying help at home, patient preference for d/c planning needs, Discharge Lounge, [facility] Meds to Bed program, availability of treatment team to discuss questions or concerns patient and/or family may have regarding understanding medications and recognizing signs and symptoms once discharged  CM also encouraged patient to follow up with all recommended appointments after discharge  Patient advised of importance for patient and family to participate in managing patients medical well being

## 2018-08-03 NOTE — DISCHARGE INSTRUCTIONS
Discharge Instructions - [address] [facility] 80 y o  female MRN: [id number]  Unit/Bed#: [facility] 911-01    Weight Bearing Status:                                           Non weight bearing with Right Lower Extremity maintained in knee immobilizer    Pain:  Continue analgesics as directed    PT/OT:  Continue PT/OT on outpatient basis as directed per primary team recommendations    Appt Instructions: If you do not have your appointment, please call the clinic at [phone] to f/u with Dr Nikki French in 2 weeks  Otherwise followup as scheduled below:      Contact the office sooner if you experience any increased numbness/tingling in the extremities

## 2018-08-03 NOTE — ASSESSMENT & PLAN NOTE
· Would hold ARB resume if bp elevated or at discharge   · Continue Norvasc     · Monitor BP, stable

## 2018-08-03 NOTE — ASSESSMENT & PLAN NOTE
· Treated with antibiotics for UTI in July  · If recurring, check for C diff  · Pt has no complaints of diarrhea now but is very lethargic sp pain medication

## 2018-08-03 NOTE — PHYSICAL THERAPY NOTE
PT Note  PT eval order noted, pt is currently pend ORIF periprosthetic knee fx; pls reconsult post-op

## 2018-08-03 NOTE — H&P
H&P- Ayesha Paris 7/20/1928, 80 y o  female MRN: [id number]    Unit/Bed#: [facility] 911-01 Encounter: [id number]    Primary Care Provider: ROMERO Madison   Date and time admitted to hospital: 8/2/2018  7:32 PM        * Femoral fracture [facility])   Assessment & Plan    · Distal femoral periprosthetic fracture  · Ortho evaluation  · Pain control        Hyponatremia   Assessment & Plan    · No prior history of hyponatremia per family- note patient started on Sertraline 4 weeks ago  · Check sodium studies  · Possibly hypovolemia component given report of diarrhea- will give fluids and monitor sodium  · If sodium worsening or not improving, may need to d/c Sertraline  Hesitant to d/c medication at this time as family reports it has helped her tremendously        Hand pain, right   Assessment & Plan    Obtain x-ray        Hypertension   Assessment & Plan    · Would hold ARB perioperatively  · Continue Norvasc  Monitor BP        Diarrhea   Assessment & Plan    · Treated with antibiotics for UTI in July  · If recurring, check for C diff        Severe aortic stenosis   Assessment & Plan    · Most recent echo on our system from 2015 states moderate AS but family states she had recent echo which showed severe AS  · If surgery planned, likely needs cardiac clearance          VTE Prophylaxis: Enoxaparin (Lovenox)  / sequential compression device   Code Status: DNR/DNI  POLST: There is no POLST form on file for this patient (pre-hospital)  Discussion with family: Family at bedside    Anticipated Length of Stay:  Patient will be admitted on an Inpatient basis with an anticipated length of stay of  > 2 midnights  Justification for Hospital Stay: Fracture, hyponatremia    Total Time for Visit, including Counseling / Coordination of Care: 1 hour  Greater than 50% of this total time spent on direct patient counseling and coordination of care      Chief Complaint:   Knee pain    History of Present Illness:    Ayesha Paris is a 80 y o  female with a history of HTN, aortic stenosis, and depression/anxiety who presents with right knee pain s/p fall  Patient was actually at the [address] [place] [address] office when she fell, injuring her knee  Patient is a poor historian and is unsure how she fell but denies syncope  She normally uses a walker  She was found to have right knee periprosthetic knee fracture and was transferred to [facility] for ortho evaluation  Currently she reports knee pain and hand pain  Labs were also significant for hyponatremia  Patient reports diarrhea starting this morning but otherwise has been in her normal state of health  She was on antibiotics in July for UTI  Her family reports that her oral intake is good  No vomiting  She was recently started on sertraline four weeks ago and her family states this has really helped her mood  No prior history of hyponatremia  Has a history of aortic stenosis, which is severe per family  She denies SOB, chest pain, edema  She does not have a diagnosis of dementia but per family is forgetful, has poor short term memory, and suffers from anxiety  She lives at home with family  Review of Systems:    Review of Systems   Constitutional: Negative  HENT: Negative  Eyes: Negative  Respiratory: Negative  Cardiovascular: Negative  Gastrointestinal: Positive for diarrhea  Negative for abdominal pain, nausea and vomiting  Endocrine: Negative  Genitourinary: Negative  Musculoskeletal:        Right knee pain, right hand pain   Skin: Negative  Allergic/Immunologic: Negative  Neurological: Negative  Hematological: Negative  Psychiatric/Behavioral: Negative          Past Medical and Surgical History:     Past Medical History:   Diagnosis Date    Hypertension     Psychiatric disorder     anxiety       Past Surgical History:   Procedure Laterality Date     SECTION      CHOLECYSTECTOMY      REPLACEMENT TOTAL KNEE         Meds/Allergies:    Prior to Admission medications    Medication Sig Start Date End Date Taking? Authorizing Provider   ALPRAZolam Eula Ward) 0 5 mg tablet Take 0 5 mg by mouth daily at bedtime as needed for anxiety   Yes Historical Provider, MD   amLODIPine-valsartan (EXFORGE) 5-320 MG per tablet Take 1 tablet by mouth daily   Yes Historical Provider, MD   sertraline (ZOLOFT) 25 mg tablet Take 50 mg by mouth daily   Yes Historical Provider, MD     I have reviewed home medications with patient family member  Allergies: No Active Allergies    Social History:     Marital Status:    Occupation: None  Patient Pre-hospital Living Situation: Lives at home  Patient Pre-hospital Level of Mobility: uses a walker  Patient Pre-hospital Diet Restrictions: None  Substance Use History:   History   Alcohol Use    0 6 oz/week    1 Standard drinks or equivalent per week     Comment: "one gin a day"     History   Smoking Status    Never Smoker   Smokeless Tobacco    Never Used     History   Drug Use No       Family History:    non-contributory    Physical Exam:     Vitals:   Blood Pressure: 136/63 (08/02/18 1928)  Pulse: 87 (08/02/18 1928)  Temperature: 97 8 °F (36 6 °C) (08/02/18 1928)  Temp Source: Oral (08/02/18 1928)  Respirations: 19 (08/02/18 1928)  Height: 5' 1" (154 9 cm) (08/02/18 1928)  Weight - Scale: 53 1 kg (117 lb) (08/02/18 1928)  SpO2: 95 % (08/02/18 1928)    Physical Exam   Constitutional: She is oriented to person, place, and time  No distress  Frail   HENT:   Forehead ecchymosis   Eyes: No scleral icterus  Neck: Neck supple  Cardiovascular: Normal rate and regular rhythm  Loud holosystolic murmur   Pulmonary/Chest: Effort normal and breath sounds normal  No respiratory distress  She has no wheezes  She has no rales  Abdominal: Soft  Bowel sounds are normal  She exhibits no distension  There is no tenderness  There is no rebound  Musculoskeletal:   Right knee in immobilizer    Right hand: ecchymosis distal 3rd digit with tenderness DIP joint  Also tenderness 3rd metacarpal   Neurological: She is alert and oriented to person, place, and time  Skin: Skin is warm and dry  She is not diaphoretic  Psychiatric: She has a normal mood and affect  Her behavior is normal            Additional Data:     Lab Results: I have personally reviewed pertinent reports  Results from last 7 days  Lab Units 08/02/18  1111   WBC Thousand/uL 9 54   HEMOGLOBIN g/dL 11 6   HEMATOCRIT % 34 2*   PLATELETS Thousands/uL 308   NEUTROS PCT % 65   LYMPHS PCT % 23   MONOS PCT % 9   EOS PCT % 2       Results from last 7 days  Lab Units 08/02/18  1111   SODIUM mmol/L 128*   POTASSIUM mmol/L 4 6   CHLORIDE mmol/L 95*   CO2 mmol/L 28   BUN mg/dL 13   CREATININE mg/dL 0 55*   CALCIUM mg/dL 9 1   TOTAL PROTEIN g/dL 7 3   BILIRUBIN TOTAL mg/dL 0 30   ALK PHOS U/L 74   ALT U/L 18   AST U/L 21   GLUCOSE RANDOM mg/dL 99       Results from last 7 days  Lab Units 08/02/18  1111   INR  1 02               Imaging: I have personally reviewed pertinent reports  X-ray right knee- periprosthetic femur fracture    No orders to display       EKG, Pathology, and Other Studies Reviewed on Admission:   · EKG: Normal sinus    Allscripts / Epic Records Reviewed: Yes     ** Please Note: This note has been constructed using a voice recognition system   **

## 2018-08-03 NOTE — PROGRESS NOTES
Progress Note - Rodri Allen 7/20/1928, 80 y o  female MRN: [id number]    Unit/Bed#: [facility] 911-01 Encounter: [id number]    Primary Care Provider: ROMERO Stanford   Date and time admitted to hospital: 8/2/2018  7:32 PM        * Femoral fracture [facility])   Assessment & Plan    · Distal femoral periprosthetic fracture  · Ortho evaluation  · Pain control  ·  discussed with ortho opting now to not take to the or will keep knee brace and come to talk to the son for nwb to right leg and rehab        Diarrhea   Assessment & Plan    · Treated with antibiotics for UTI in July  · If recurring, check for C diff  · Pt has no complaints of diarrhea now but is very lethargic sp pain medication         Hyponatremia   Assessment & Plan    · No prior history of hyponatremia per family- note patient started on Sertraline 4 weeks ago  · Check sodium studies   · Possibly hypovolemia component given report of diarrhea- iv fluids decrease rate and start diet   · If sodium worsening or not improving, may need to d/c Sertraline  ·  Hesitant to d/c medication at this time as family reports it has helped her tremendously        Severe aortic stenosis   Assessment & Plan    · Most recent echo on our system from 2015 states moderate AS but family states she had recent echo which showed severe AS  · If surgery planned, likely needs cardiac clearance however discussed with ortho and cards will hold off on consult now as pt not for or   · Echo completed still pending final read        Hand pain, right   Assessment & Plan    Obtain x-ray: No acute osseous abnormality  Hypertension   Assessment & Plan    · Would hold ARB resume if bp elevated or at discharge   · Continue Norvasc  · Monitor BP, stable               VTE Pharmacologic Prophylaxis:   Pharmacologic: Enoxaparin (Lovenox)  Mechanical VTE Prophylaxis in Place: Yes    Patient Centered Rounds: I have performed bedside rounds with nursing staff today      Discussions with Specialists or Other Care Team Provider: nursing     Education and Discussions with Family / Patient: patient     Time Spent for Care: 45 minutes  More than 50% of total time spent on counseling and coordination of care as described above  Current Length of Stay: 1 day(s)    Current Patient Status: Inpatient   Certification Statement: The patient will continue to require additional inpatient hospital stay due to ongoing iv abx treatment and bridge to coumadin as only choice based on high copay     Discharge Plan: home when medically stable off iv abx and therepeutic on lovenox     Code Status: Level 3 - DNAR and DNI      Subjective:   Pt is doing well she states no pain but she is very lethargic sp low dose pain medication  long discussion had wtih    Objective:     Vitals:   Temp (24hrs), Av °F (36 7 °C), Min:97 8 °F (36 6 °C), Max:98 3 °F (36 8 °C)    HR:  [76-87] 76  Resp:  [18-24] 18  BP: (118-147)/(58-67) 118/59  SpO2:  [92 %-95 %] 95 %  Body mass index is 22 11 kg/m²  Input and Output Summary (last 24 hours): Intake/Output Summary (Last 24 hours) at 18 1602  Last data filed at 18 1401   Gross per 24 hour   Intake                0 ml   Output              575 ml   Net             -575 ml       Physical Exam:     Physical Exam   Constitutional: She appears well-developed  No distress  HENT:   Head: Normocephalic and atraumatic  Mouth/Throat: No oropharyngeal exudate  Eyes: Conjunctivae are normal  Right eye exhibits no discharge  Left eye exhibits no discharge  No scleral icterus  Neck: No JVD present  No tracheal deviation present  No thyromegaly present  Cardiovascular: Normal rate  Exam reveals no gallop and no friction rub  Murmur heard  Pulmonary/Chest: No stridor  No respiratory distress  She has no wheezes  She has no rales  She exhibits no tenderness  Poor effort    Abdominal: She exhibits no distension and no mass  There is no tenderness   There is no rebound and no guarding  Musculoskeletal: She exhibits tenderness and deformity (right knee brace )  She exhibits no edema  Lymphadenopathy:     She has no cervical adenopathy  Neurological: She is alert  Skin: No rash noted  She is not diaphoretic  No erythema  No pallor  Psychiatric:   Lethargic        Additional Data:     Labs:      Results from last 7 days  Lab Units 08/03/18  0439   WBC Thousand/uL 16 19*   HEMOGLOBIN g/dL 10 8*   HEMATOCRIT % 32 3*   PLATELETS Thousands/uL 290   NEUTROS PCT % 80*   LYMPHS PCT % 12*   MONOS PCT % 6   EOS PCT % 2       Results from last 7 days  Lab Units 08/03/18  0439 08/02/18  1111   SODIUM mmol/L 129* 128*   POTASSIUM mmol/L 3 7 4 6   CHLORIDE mmol/L 95* 95*   CO2 mmol/L 27 28   BUN mg/dL 8 13   CREATININE mg/dL 0 46* 0 55*   CALCIUM mg/dL 8 3 9 1   TOTAL PROTEIN g/dL  --  7 3   BILIRUBIN TOTAL mg/dL  --  0 30   ALK PHOS U/L  --  74   ALT U/L  --  18   AST U/L  --  21   GLUCOSE RANDOM mg/dL 106 99       Results from last 7 days  Lab Units 08/02/18  1111   INR  1 02                 * I Have Reviewed All Lab Data Listed Above  * Additional Pertinent Lab Tests Reviewed:  All Labs Within Last 24 Hours Reviewed    Imaging:    Imaging Reports Reviewed Today Include: reviewed       Recent Cultures (last 7 days):           Last 24 Hours Medication List:     Current Facility-Administered Medications:  acetaminophen 975 mg Oral Q8H [facility] Felicita Dacosta PA-C    ALPRAZolam 0 125 mg Oral TID Keyla M Bendas, DO    amLODIPine 10 mg Oral Daily Jyothi Browne, DO    calcium carbonate 1,000 mg Oral Daily PRN Felicita IRVING Dacosta-MCKAYLA    enoxaparin 40 mg Subcutaneous Daily Felicitarandall Dacosta PA-C    gabapentin 100 mg Oral HS Keyla M Bendas, DO    HYDROmorphone 0 2 mg Intravenous Q2H PRN Keyla M Bendas, DO    ondansetron 4 mg Intravenous Q6H PRN Felicita IRVING Dacosta-MCKAYLA    oxyCODONE 5 mg Oral 4x Daily Keyla M Bendas, DO    senna 1 tablet Oral HS Keyla M Bendas, DO    sertraline 50 mg Oral Daily Wandy Sifuentes PA-C    sodium chloride 50 mL/hr Intravenous Continuous ROMERO Bruce Last Rate: 100 mL/hr (08/03/18 0434)        Today, Patient Was Seen By: ROMERO Bruce    ** Please Note: Dictation voice to text software may have been used in the creation of this document   **

## 2018-08-03 NOTE — PLAN OF CARE
CARDIOVASCULAR - ADULT     Maintains optimal cardiac output and hemodynamic stability Progressing     Absence of cardiac dysrhythmias or at baseline rhythm Progressing        DISCHARGE PLANNING     Discharge to home or other facility with appropriate resources Progressing        INFECTION - ADULT     Absence or prevention of progression during hospitalization Progressing     Absence of fever/infection during neutropenic period Progressing        Knowledge Deficit     Patient/family/caregiver demonstrates understanding of disease process, treatment plan, medications, and discharge instructions Progressing        METABOLIC, FLUID AND ELECTROLYTES - ADULT     Electrolytes maintained within normal limits Progressing     Fluid balance maintained Progressing        MUSCULOSKELETAL - ADULT     Maintain or return mobility to safest level of function Progressing     Maintain proper alignment of affected body part Progressing        Nutrition/Hydration-ADULT     Nutrient/Hydration intake appropriate for improving, restoring or maintaining nutritional needs Progressing        PAIN - ADULT     Verbalizes/displays adequate comfort level or baseline comfort level Progressing        Potential for Falls     Patient will remain free of falls Progressing        Prexisting or High Potential for Compromised Skin Integrity     Skin integrity is maintained or improved Progressing        SAFETY ADULT     Maintain or return to baseline ADL function Progressing     Maintain or return mobility status to optimal level Progressing

## 2018-08-03 NOTE — CONSULTS
Consultation - [facility] 80 y o  female MRN: [id number]  Unit/Bed#: [facility] 911-01 Encounter: [id number]      Assessment/Plan     Assessment:  Patient Active Problem List   Diagnosis    Femoral fracture ([facility] [address] )    Severe aortic stenosis    Hyponatremia    Diarrhea    Hypertension    Hand pain, right    Closed fracture of right distal femur ([facility] [address] )       Plan:  1  Agree with Tylenol 975 mg TID and ice to affected area  2  Start oxyIR 5 mg q6H ATC with strict hold parameters  3  Continue IV Dilaudid 0 2 mg but change to q2H PRN for breakthrough pain  4  Start Gabapentin 100 mg qHS  5  Bowel regimen to prevent OIC  6  Patient at risk for delirium given age and co-morbidities, recommend global delirium precautions as follows:   - Establishment of day/night cycle via lights during the day and blinds open   Please limit interruptions at night as medically appropriate  - Patient should be out of bed during the day as tolerated or medically indicated  - Provide glasses/hearing aids as apprioriate      - Minimize deliriogenic meds as able  - Provide reorientation including date on board and visible clock  - Avoid restraints as able, frequent verbal reorientations or patient care sitter as appropriate    - Consider use of melatonin qHS for circadian rhythm maintenance  7  GOALS- family requesting all information before making final decision  They are unsure if they would want to pursue operative repair given her age and frailty  They re-iterate DNR/DNI status  Will plan to follow up after other consultants  In the meantime, will try to optimize her pain regimen for improved pain control  History of Present Illness   Physician Requesting Consult: Krystyna Garcia DO  Reason for Consult / Principal Problem: goals  Hx and PE limited by: patient in distress  HPI: Joshua Downing is a 80y o  year old female who presents with leg pain   She has a history of reported severe AS and depression/anxiety who presented after sustaining a fall  Imaging demonstrates a right distal femur periprosthetic fracture  She was seen by orthopaedics and additional imagine will be obtained  Given her reported history of severe AS and her overall frail state- patient and family unsure if they want to pursue operative intervention  She currently is stating the "worst pain of my life and I had natural childbirth!" She is unable to characterize it but states that it is not constant and worsens with movement  She does admit that when she is not experiencing pain she is worried that it will come back  Her family are at bedside and are worried about any operation but feel it important to gather all information from other consultants before formalizing a decision  Inpatient consult to Palliative Care  Consult performed by: Johny Mckinley  Consult ordered by: Aundria Schlatter          Review of Systems   Unable to perform ROS: Other   Difficult to ascertain give pain     Historical Information   Past Medical History:   Diagnosis Date    Hypertension     Psychiatric disorder     anxiety     Past Surgical History:   Procedure Laterality Date     SECTION      CHOLECYSTECTOMY      REPLACEMENT TOTAL KNEE       Social History     Social History    Marital status:      Spouse name: N/A    Number of children: N/A    Years of education: N/A     Social History Main Topics    Smoking status: Never Smoker    Smokeless tobacco: Never Used    Alcohol use 0 6 oz/week     1 Standard drinks or equivalent per week      Comment: "one gin a day"    Drug use: No    Sexual activity: Not Asked     Other Topics Concern    None     Social History Narrative    None     History reviewed  No pertinent family history      Meds/Allergies   all current active meds have been reviewed and current meds:   Current Facility-Administered Medications   Medication Dose Route Frequency    acetaminophen (TYLENOL) tablet 975 mg  975 mg Oral Q8H [address]    ALPRAZolam (XANAX) tablet 0 5 mg  0 5 mg Oral HS PRN    amLODIPine (NORVASC) tablet 10 mg  10 mg Oral Daily    calcium carbonate (TUMS) chewable tablet 1,000 mg  1,000 mg Oral Daily PRN    enoxaparin (LOVENOX) subcutaneous injection 40 mg  40 mg Subcutaneous Daily    HYDROmorphone (DILAUDID) injection 0 2 mg  0 2 mg Intravenous Q4H PRN    ondansetron (ZOFRAN) injection 4 mg  4 mg Intravenous Q6H PRN    oxyCODONE (ROXICODONE) IR tablet 2 5 mg  2 5 mg Oral Q4H PRN    oxyCODONE (ROXICODONE) IR tablet 5 mg  5 mg Oral Q4H PRN    sertraline (ZOLOFT) tablet 50 mg  50 mg Oral Daily    sodium chloride 0 9 % infusion  100 mL/hr Intravenous Continuous       Palliative Care Medications: na    No Active Allergies    Objective     Physical Exam   Constitutional: She is oriented to person, place, and time  She appears distressed  Frail, elderly female   HENT:   Head: Normocephalic and atraumatic  Right Ear: External ear normal    Left Ear: External ear normal    Nose: Nose normal    Mouth/Throat: Oropharynx is clear and moist    Eyes: Conjunctivae and EOM are normal  Right eye exhibits no discharge  Left eye exhibits no discharge  Neck: Neck supple  Cardiovascular: Normal rate, regular rhythm and intact distal pulses  Murmur heard  Pulmonary/Chest: Effort normal and breath sounds normal  No respiratory distress  Abdominal: Soft  Bowel sounds are normal  She exhibits no distension  There is no tenderness  Musculoskeletal: She exhibits tenderness  She exhibits no edema  Neurological: She is alert and oriented to person, place, and time  Skin: Skin is warm and dry  Psychiatric:   Anxious and tearful    Nursing note and vitals reviewed  Lab Results:   I have personally reviewed pertinent labs  , CBC:   Lab Results   Component Value Date    WBC 16 19 (H) 08/03/2018    HGB 10 8 (L) 08/03/2018    HCT 32 3 (L) 08/03/2018    MCV 90 08/03/2018     08/03/2018    MCH 30 2 08/03/2018    MCHC 33 4 08/03/2018    RDW 15 2 (H) 08/03/2018    MPV 9 8 08/03/2018    NRBC 0 08/03/2018   , CMP:   Lab Results   Component Value Date     (L) 08/03/2018    K 3 7 08/03/2018    CL 95 (L) 08/03/2018    CO2 27 08/03/2018    ANIONGAP 7 08/03/2018    BUN 8 08/03/2018    CREATININE 0 46 (L) 08/03/2018    GLUCOSE 106 08/03/2018    CALCIUM 8 3 08/03/2018    AST 21 08/02/2018    ALT 18 08/02/2018    ALKPHOS 74 08/02/2018    PROT 7 3 08/02/2018    BILITOT 0 30 08/02/2018    EGFR 88 08/03/2018     Imaging Studies: I have personally reviewed pertinent reports  EKG, Pathology, and Other Studies: I have personally reviewed pertinent reports  Code Status: Level 3 - DNAR and DNI  Advance Directive and Living Will:      Power of :    POLST:      Counseling / Coordination of Care  Total floor / unit time spent today 75+ minutes  Greater than 50% of total time was spent with the patient and / or family counseling and / or coordination of care  A description of the counseling / coordination of care: pain control, symptom assessment and management, goals of care, supportive listening

## 2018-08-03 NOTE — ASSESSMENT & PLAN NOTE
· No prior history of hyponatremia per family- note patient started on Sertraline 4 weeks ago  · Check sodium studies  · Possibly hypovolemia component given report of diarrhea- will give fluids and monitor sodium  · If sodium worsening or not improving, may need to d/c Sertraline   Hesitant to d/c medication at this time as family reports it has helped her tremendously

## 2018-08-03 NOTE — CASE MANAGEMENT
Initial Clinical Review    Admission: Date/Time/Statement: 8/2/18 @ 2034     Orders Placed This Encounter   Procedures    Inpatient Admission     Standing Status:   Standing     Number of Occurrences:   1     Order Specific Question:   Admitting Physician     Answer:   Heather Yi [[id number]]     Order Specific Question:   Level of Care     Answer:   Med Surg [16]     Order Specific Question:   Estimated length of stay     Answer:   More than 2 Midnights     Order Specific Question:   Certification     Answer:   I certify that inpatient services are medically necessary for this patient for a duration of greater than two midnights  See H&P and MD Progress Notes for additional information about the patient's course of treatment  Date/Time/Mode of Arrival: 8/2 Transfer from [address] [place] [address] ED    Chief Complaint:  Knee pain    History of Illness: 80 y o  female with a history of HTN, aortic stenosis, and depression/anxiety who presents with right knee pain s/p fall  Patient was actually at the [address] [place] [address] office when she fell, injuring her knee  Patient is a poor historian and is unsure how she fell but denies syncope  She normally uses a walker  She was found to have right knee periprosthetic knee fracture and was transferred to [facility] for ortho evaluation  Currently she reports knee pain and hand pain  Labs were also significant for hyponatremia      Patient reports diarrhea starting this morning but otherwise has been in her normal state of health  She was on antibiotics in July for UTI  Her family reports that her oral intake is good  No vomiting  She was recently started on sertraline four weeks ago and her family states this has really helped her mood  No prior history of hyponatremia      Has a history of aortic stenosis, which is severe per family      Vital Signs:   Vitals   Temperature Pulse Respirations Blood Pressure SpO2   08/02/18 1928 08/02/18 1928 08/02/18 1928 08/02/18 1928 08/02/18 1928   97 8 °F (36 6 °C) 87 19 136/63 95 %      Temp Source Heart Rate Source Patient Position - Orthostatic VS BP Location FiO2 (%)   08/02/18 1928 08/02/18 2318 08/02/18 1928 08/02/18 1928 --   Oral Monitor Lying Right arm       Pain Score       08/02/18 1953       6        Wt Readings from Last 1 Encounters:   08/02/18 53 1 kg (117 lb)       Vital Signs (abnormal): WNL    Abnormal Labs:    08/02/18 1111    Sodium 136 - 145 mmol/L 128        08/03/18 0439    WBC 4 31 - 10 16 Thousand/uL 16 19     RBC 3 81 - 5 12 Million/uL 3 58     Hemoglobin 11 5 - 15 4 g/dL 10 8     Hematocrit 34 8 - 46 1 % 32 3         Diagnostic Test Results: CT Head - 1  Limited examination due to patient motion  2   Moderate cerebral atrophy with chronic small vessel ischemic change  8/2 Xray Right Knee - Acute distal femoral periprosthetic fracture with mild angulation and small joint effusion  CT Right Knee - There is an oblique mildly displaced periprosthetic fracture of the distal medial femur with the fracture gap of about 7 mm  8/3 Repeat Xray Right Knee - Increased size of joint effusion  Periprosthetic femoral fracture redemonstrated        Past Medical/Surgical History:    Active Ambulatory Problems     Diagnosis Date Noted    No Active Ambulatory Problems     Resolved Ambulatory Problems     Diagnosis Date Noted    No Resolved Ambulatory Problems     Past Medical History:   Diagnosis Date    Hypertension     Psychiatric disorder        Admitting Diagnosis: Oth fracture of shaft of unsp femur, init for clos fx ([facility] ) [L11 308A]    Age/Sex: 80 y o  female    Assessment/Plan:   * Femoral fracture (HCC)   Assessment & Plan     · Distal femoral periprosthetic fracture  · Ortho evaluation  · Pain control          Hyponatremia   Assessment & Plan     · No prior history of hyponatremia per family- note patient started on Sertraline 4 weeks ago  · Check sodium studies  · Possibly hypovolemia component given report of diarrhea- will give fluids and monitor sodium  · If sodium worsening or not improving, may need to d/c Sertraline  Hesitant to d/c medication at this time as family reports it has helped her tremendously          Hand pain, right   Assessment & Plan     Obtain x-ray          Hypertension   Assessment & Plan     · Would hold ARB perioperatively  · Continue Norvasc  Monitor BP          Diarrhea   Assessment & Plan     · Treated with antibiotics for UTI in July  · If recurring, check for C diff          Severe aortic stenosis   Assessment & Plan     · Most recent echo on our system from 2015 states moderate AS but family states she had recent echo which showed severe AS  · If surgery planned, likely needs cardiac clearance             VTE Prophylaxis: Enoxaparin (Lovenox)  / sequential compression device   Code Status: DNR/DNI  POLST: There is no POLST form on file for this patient (pre-hospital)  Discussion with family: Family at bedside     Anticipated Length of Stay:  Patient will be admitted on an Inpatient basis with an anticipated length of stay of  > 2 midnights  Justification for Hospital Stay: Fracture, hyponatremia       Admission Orders:  NPO; Sips with meds  Echo  Orthopedic Surgery cons  Palliative Care cons  PT/OT eval and treat    Scheduled Meds:   Current Facility-Administered Medications:  acetaminophen 975 mg Oral Q8H [address]   ALPRAZolam 0 125 mg Oral TID   amLODIPine 10 mg Oral Daily   enoxaparin 40 mg Subcutaneous Daily   gabapentin 100 mg Oral HS   oxyCODONE 5 mg Oral 4x Daily   senna 1 tablet Oral HS   sertraline 50 mg Oral Daily     Continuous Infusions:   sodium chloride 100 mL/hr Last Rate: 100 mL/hr (08/03/18 2357)     PRN Meds:   calcium carbonate    HYDROmorphone Iv x2    Ondansetron  Oxycodone po x3    ----------------------------------------------------------------------------------------------------------    8/2 Orthopedic Surgery cons:  Assessment:  80 y  o female status post fall with right periprosthetic distal femur fracture     Plan:   · Non weight bearing right lower extremity in knee immobilizer  · To OR for ORIF of right periprosthetic distal femur fracture  · CT Scan R Knee for pre-operative planning  · Analgesics for pain  · Informed consent obtained  · Pre op labs  · Cardiology consult for clearance  · NPO at midnight    ---------------------------------------------------------------------------------------------------    8/3 Orthopedic progress notes:  Assessment:  80 y  o female status post fall with right periprosthetic distal femur fracture     Plan:   · Non weight bearing right lower extremity in knee immobilizer  · To OR for ORIF of right periprosthetic distal femur fracture  · CT Scan R Knee for pre-operative planning  · Analgesics for pain  · Informed consent obtained  · Pre op labs  · Cardiology consult for clearance  · NPO at midnight    -------------------------------------------------------------------------------------------------  8/3 Palliative Care cons:  Assessment:      Patient Active Problem List   Diagnosis    Femoral fracture ([facility] )    Severe aortic stenosis    Hyponatremia    Diarrhea    Hypertension    Hand pain, right    Closed fracture of right distal femur ([facility] )         Plan:  1  Agree with Tylenol 975 mg TID and ice to affected area  2  Start oxyIR 5 mg q6H ATC with strict hold parameters  3  Continue IV Dilaudid 0 2 mg but change to q2H PRN for breakthrough pain  4  Start Gabapentin 100 mg qHS  5  Bowel regimen to prevent OIC  6  Patient at risk for delirium given age and co-morbidities, recommend global delirium precautions as follows:              - Establishment of day/night cycle via lights during the day and blinds open   Please limit interruptions at night as medically appropriate  - Patient should be out of bed during the day as tolerated or medically indicated                 - Provide glasses/hearing aids as apprioriate                 - Minimize deliriogenic meds as able  - Provide reorientation including date on board and visible clock  - Avoid restraints as able, frequent verbal reorientations or patient care sitter as appropriate               - Consider use of melatonin qHS for circadian rhythm maintenance  7  GOALS- family requesting all information before making final decision  They are unsure if they would want to pursue operative repair given her age and frailty  They re-iterate DNR/DNI status  Will plan to follow up after other consultants   In the meantime, will try to optimize her pain regimen for improved pain control

## 2018-08-03 NOTE — NUTRITION
08/03/18 1606   Recommendations/Interventions   Nutrition Recommendations Continue diet as ordered  (provide Enlive TID, pt likes all flavors/son)

## 2018-08-03 NOTE — ASSESSMENT & PLAN NOTE
· No prior history of hyponatremia per family- note patient started on Sertraline 4 weeks ago  · Check sodium studies   · Possibly hypovolemia component given report of diarrhea- iv fluids decrease rate and start diet   · If sodium worsening or not improving, may need to d/c Sertraline    ·  Hesitant to d/c medication at this time as family reports it has helped her tremendously

## 2018-08-03 NOTE — ASSESSMENT & PLAN NOTE
· Distal femoral periprosthetic fracture  · Ortho evaluation  · Pain control  ·  discussed with ortho opting now to not take to the or will keep knee brace and come to talk to the son for nwb to right leg and rehab

## 2018-08-03 NOTE — ASSESSMENT & PLAN NOTE
· Most recent echo on our system from 2015 states moderate AS but family states she had recent echo which showed severe AS  · If surgery planned, likely needs cardiac clearance however discussed with ortho and cards will hold off on consult now as pt not for or   · Echo completed still pending final read

## 2018-08-03 NOTE — CONSULTS
Orthopedics   Mekhi Rao 80 y o  female MRN: [id number]  Unit/Bed#: X ray      Chief Complaint:   right knee pain    HPI:   80 y  o female who ambulates with walker and cane status post fall from standing complaining of right knee pain and inability to bear weight  Patient was on her way to PCP and fell from standing, but the patient and her family are not sure how she fell  She had a TKA done 16 years ago in [place]  She had no pain with her knee before this, and she is not on any blood thinners  Review Of Systems:   · Skin: Normal  · Neuro: See HPI  · Musculoskeletal: See HPI  · 14 point review of systems negative except as stated above     Past Medical History:   Past Medical History:   Diagnosis Date    Hypertension     Psychiatric disorder     anxiety       Past Surgical History:   Past Surgical History:   Procedure Laterality Date     SECTION      CHOLECYSTECTOMY      REPLACEMENT TOTAL KNEE         Family History:  Family history reviewed and non-contributory  History reviewed  No pertinent family history  Social History:  Social History     Social History    Marital status:       Spouse name: N/A    Number of children: N/A    Years of education: N/A     Social History Main Topics    Smoking status: Never Smoker    Smokeless tobacco: Never Used    Alcohol use 0 6 oz/week     1 Standard drinks or equivalent per week      Comment: "one gin a day"    Drug use: No    Sexual activity: Not Asked     Other Topics Concern    None     Social History Narrative    None       Allergies:   No Active Allergies        Labs:    0  Lab Value Date/Time   HCT 34 2 (L) 2018 1111   HCT 36 7 10/20/2015 0921   HCT 34 3 (L) 2015 0640   HCT 38 3 2015 1342   HGB 11 6 2018 1111   HGB 12 4 10/20/2015 0921   HGB 11 1 (L) 2015 0640   HGB 13 0 2015 1342   INR 1 02 2018 1111   WBC 9 54 2018 1111   WBC 9 12 10/20/2015 0921   WBC 11 37 (H) 2015 3474 WBC 11 00 (H) 09/21/2015 1342   ESR 20 10/20/2015 0921       Meds:    Current Facility-Administered Medications:     acetaminophen (TYLENOL) tablet 975 mg, 975 mg, Oral, Q8H [address], John Manzanares PA-C, 975 mg at 08/02/18 2104    ALPRAZolam Markus Linger) tablet 0 5 mg, 0 5 mg, Oral, HS PRN, John Manzanares PA-C, 0 5 mg at 08/02/18 2226    [START ON 8/3/2018] amLODIPine (NORVASC) tablet 10 mg, 10 mg, Oral, Daily, Ely Rojas DO    calcium carbonate (TUMS) chewable tablet 1,000 mg, 1,000 mg, Oral, Daily PRN, John Manzanares PA-C    [START ON 8/3/2018] enoxaparin (LOVENOX) subcutaneous injection 40 mg, 40 mg, Subcutaneous, Daily, Patito Starks PA-C    HYDROmorphone (DILAUDID) injection 0 2 mg, 0 2 mg, Intravenous, Q4H PRN, John Manzanares PA-C    ondansetron [facility]) injection 4 mg, 4 mg, Intravenous, Q6H PRN, John Manzanares PA-C    oxyCODONE (ROXICODONE) IR tablet 2 5 mg, 2 5 mg, Oral, Q4H PRN, John Manzanares PA-C, 2 5 mg at 08/02/18 2055    oxyCODONE (ROXICODONE) IR tablet 5 mg, 5 mg, Oral, Q4H PRN, John Manzanares PA-C    [START ON 8/3/2018] sertraline (ZOLOFT) tablet 50 mg, 50 mg, Oral, Daily, Patito Starks PA-C    sodium chloride 0 9 % infusion, 100 mL/hr, Intravenous, Continuous, John Manzanares PA-C, Last Rate: 100 mL/hr at 08/02/18 2105, 100 mL/hr at 08/02/18 2105    Blood Culture:   No results found for: BLOODCX    Wound Culture:   No results found for: WOUNDCULT    Ins and Outs:  No intake/output data recorded  Physical Exam:   /63 (BP Location: Right arm)   Pulse 87   Temp 97 8 °F (36 6 °C) (Oral)   Resp 19   Ht 5' 1" (1 549 m)   Wt 53 1 kg (117 lb)   SpO2 95%   BMI 22 11 kg/m²   Gen: Alert and oriented to person, place, time  HEENT: EOMI, eyes clear, moist mucus membranes, hearing intact  Respiratory: Bilateral chest rise   No audible wheezing found  Cardiovascular: Regular Rate and Rhythm  Abdomen: soft nontender/nondistended  Musculoskeletal: right lower extremity  · Skin intact, no open wounds  · Tender to palpation over entire knee and proximal tibia  · Painful knee range of motion  · Unable to tolerate varus/valgus stress due to pain  · Motor sensory exam is intact distally  · 2+ DP pulse    Radiology:   I personally reviewed the films  X-rays right knee shows periprosthetic distal femur fracture    Assessment:  89 y  o female status post fall with right periprosthetic distal femur fracture    Plan:   · Non weight bearing right lower extremity in knee immobilizer  · To OR for ORIF of right periprosthetic distal femur fracture  · CT Scan R Knee for pre-operative planning  · Analgesics for pain  · Informed consent obtained    · Pre op labs  · Cardiology consult for clearance  · NPO at midnight      Verenice Rose MD

## 2018-08-03 NOTE — CASE MANAGEMENT
Notification of Inpatient Admission/Inpatient Authorization Request  This is a Notification of Inpatient Admission/Request for Inpatient Authorization to our facility [facility]  Please be advised that this patient is currently in our facility under Inpatient Status  Below you will find the Attending Physician and Facilitys information including NPI# and contact information for the Utilization  assigned to the [facility] where the patient is receiving services  Please feel free to contact the Utilization Review Department with any questions  Patient Information:  PATIENT NAME: Rodri Allen  MRN: [id number]  YOB: 1928    PRESENTATION DATE: 8/2/2018  7:32 PM  IP ADMISSION DATE: 8/2/18 1932  DISCHARGE DATE: No discharge date for patient encounter  [facility]    Attending Physician:  NICKI Aguiar  Specialty- Hospitalist,   [facility] ID- [id number]  [address] [facility], [address] [facility]  Phone 1: [phone]  Fax: [phone]    Facility:  [address] [place] [address], [address] [facility] [phone]  NPI: [id number]  TAX ID# [id number]  MEDICARE ID: [id number]    Thank you,  [employer] Utilization Review Department  Phone: [phone]; Fax [phone]  ATTENTION: The Network Utilization Review Department is now centralized for our 9 Facilities  Make a note that we have a new phone and fax numbers for our Department  Please call with any questions or concerns to [phone] and carefully follow the prompts so that you are directed to the right person  All voicemails are confidential  Fax any determinations, approvals, denials, and requests for initial or continue stay review clinical to [phone]   Due to HIGH CALL volume, it would be easier if you could please send faxed requests to expedite your requests and in part, help us provide discharge notifications faster

## 2018-08-03 NOTE — ASSESSMENT & PLAN NOTE
· Most recent echo on our system from 2015 states moderate AS but family states she had recent echo which showed severe AS  · If surgery planned, likely needs cardiac clearance

## 2018-08-03 NOTE — OCCUPATIONAL THERAPY NOTE
OCCUPATIONAL THERAPY SCREEN:    ORDERS RECEIVED  CHART REVIEW COMPLETED  PT PLANNED FOR ORIF PERIPROSTHETIC KNEE FX  PLEASE RECONSULT POST-OP       Chisé Татьянаcik, MOT, OTR/L

## 2018-08-03 NOTE — PROGRESS NOTES
Progress Note - Orthopedics   Lovely Ramirez 80 y o  female MRN: [id number]  [facility]      Subjective:    80 y  o female hospital day 1 after sustaining a right periprosthetic distal femur fracture  Patient had no acute events overnight though she is extremely uncomfortable this morning  Knee immobilizer remains in place   Denies fevers chills, CP, SOB    Labs:    0  Lab Value Date/Time   HCT 32 3 (L) 08/03/2018 0439   HCT 34 2 (L) 08/02/2018 1111   HCT 36 7 10/20/2015 0921   HCT 34 3 (L) 09/22/2015 0640   HCT 38 3 09/21/2015 1342   HGB 10 8 (L) 08/03/2018 0439   HGB 11 6 08/02/2018 1111   HGB 12 4 10/20/2015 0921   HGB 11 1 (L) 09/22/2015 0640   HGB 13 0 09/21/2015 1342   INR 1 02 08/02/2018 1111   WBC 16 19 (H) 08/03/2018 0439   WBC 9 54 08/02/2018 1111   WBC 9 12 10/20/2015 0921   WBC 11 37 (H) 09/22/2015 0640   WBC 11 00 (H) 09/21/2015 1342   ESR 20 10/20/2015 0921       Meds:    Current Facility-Administered Medications:     acetaminophen (TYLENOL) tablet 975 mg, 975 mg, Oral, Q8H [facility], Alicia Catalan PA-C, 975 mg at 08/03/18 0501    ALPRAZolam Linda Shimon) tablet 0 5 mg, 0 5 mg, Oral, HS PRN, Alicia Catalan PA-C, 0 5 mg at 08/02/18 2226    amLODIPine (NORVASC) tablet 10 mg, 10 mg, Oral, Daily, Jose Perez DO    calcium carbonate (TUMS) chewable tablet 1,000 mg, 1,000 mg, Oral, Daily PRN, Alicia Catalan PA-C    enoxaparin (LOVENOX) subcutaneous injection 40 mg, 40 mg, Subcutaneous, Daily, Patito Starks PA-C    HYDROmorphone (DILAUDID) injection 0 2 mg, 0 2 mg, Intravenous, Q4H PRN, Alicia Catalan PA-C, 0 2 mg at 08/03/18 0616    ondansetron (ZOFRAN) injection 4 mg, 4 mg, Intravenous, Q6H PRN, Alicia Catalan PA-C    oxyCODONE (ROXICODONE) IR tablet 2 5 mg, 2 5 mg, Oral, Q4H PRN, Alicia Catalan PA-C, 2 5 mg at 08/03/18 0432    oxyCODONE (ROXICODONE) IR tablet 5 mg, 5 mg, Oral, Q4H PRN, Alicia Catalan PA-C    sertraline (ZOLOFT) tablet 50 mg, 50 mg, Oral, Daily, Scott Montemayor PA-C    sodium chloride 0 9 % infusion, 100 mL/hr, Intravenous, Continuous, Dana Salazar PA-C, Last Rate: 100 mL/hr at 08/03/18 0434, 100 mL/hr at 08/03/18 0434    Blood Culture:   No results found for: BLOODCX    Wound Culture:   No results found for: WOUNDCULT    Ins and Outs:  I/O last 24 hours: In: -   Out: 300 [Urine:300]          Physical:  Vitals:    08/02/18 2318   BP: 119/58   Pulse: 77   Resp: 18   Temp: 98 3 °F (36 8 °C)   SpO2: 94%     Musculoskeletal: right Lower Extremity  · Skin:  Knee immobilizer in place  · SILT s/sp/dp   · +fhl/ehl, +ankle dorsi/plantar flexion  · +PT pulse    _*_*_*_*_*_*_*_*_*_*_*_*_*_*_*_*_*_*_*_*_*_*_*_*_*_*_*_*_*_*_*_*_*_*_*_*_*_*_*_*_*    Assessment:    80 y  o female hospital day 1 status post fall from standing sustaining a right periprosthetic distal femur fracture     Plan:  · NWB in KI  · Will need operative intervention for fixation of right distal femur    Continue refine plan with team  · Pain control per primary team   Consider acute pain consultation  · DVT ppx:  Hold for OR  · Dispo: Ortho will follow    Lisbeth Ramirez MD

## 2018-08-04 LAB
ANION GAP SERPL CALCULATED.3IONS-SCNC: 7 MMOL/L (ref 4–13)
BASOPHILS # BLD AUTO: 0.03 THOUSANDS/ΜL (ref 0–0.1)
BASOPHILS NFR BLD AUTO: 0 % (ref 0–1)
BUN SERPL-MCNC: 8 MG/DL (ref 5–25)
CALCIUM SERPL-MCNC: 8.4 MG/DL (ref 8.3–10.1)
CHLORIDE SERPL-SCNC: 95 MMOL/L (ref 100–108)
CO2 SERPL-SCNC: 27 MMOL/L (ref 21–32)
CREAT SERPL-MCNC: 0.37 MG/DL (ref 0.6–1.3)
EOSINOPHIL # BLD AUTO: 0.23 THOUSAND/ΜL (ref 0–0.61)
EOSINOPHIL NFR BLD AUTO: 1 % (ref 0–6)
ERYTHROCYTE [DISTWIDTH] IN BLOOD BY AUTOMATED COUNT: 15 % (ref 11.6–15.1)
GFR SERPL CREATININE-BSD FRML MDRD: 94 ML/MIN/1.73SQ M
GLUCOSE SERPL-MCNC: 103 MG/DL (ref 65–140)
HCT VFR BLD AUTO: 27.6 % (ref 34.8–46.1)
HGB BLD-MCNC: 9.3 G/DL (ref 11.5–15.4)
IMM GRANULOCYTES # BLD AUTO: 0.07 THOUSAND/UL (ref 0–0.2)
IMM GRANULOCYTES NFR BLD AUTO: 0 % (ref 0–2)
LYMPHOCYTES # BLD AUTO: 1.94 THOUSANDS/ΜL (ref 0.6–4.47)
LYMPHOCYTES NFR BLD AUTO: 12 % (ref 14–44)
MCH RBC QN AUTO: 30 PG (ref 26.8–34.3)
MCHC RBC AUTO-ENTMCNC: 33.7 G/DL (ref 31.4–37.4)
MCV RBC AUTO: 89 FL (ref 82–98)
MONOCYTES # BLD AUTO: 1.4 THOUSAND/ΜL (ref 0.17–1.22)
MONOCYTES NFR BLD AUTO: 9 % (ref 4–12)
NEUTROPHILS # BLD AUTO: 12.82 THOUSANDS/ΜL (ref 1.85–7.62)
NEUTS SEG NFR BLD AUTO: 78 % (ref 43–75)
NRBC BLD AUTO-RTO: 0 /100 WBCS
PLATELET # BLD AUTO: 241 THOUSANDS/UL (ref 149–390)
PMV BLD AUTO: 10 FL (ref 8.9–12.7)
POTASSIUM SERPL-SCNC: 3.2 MMOL/L (ref 3.5–5.3)
RBC # BLD AUTO: 3.1 MILLION/UL (ref 3.81–5.12)
SODIUM SERPL-SCNC: 129 MMOL/L (ref 136–145)
WBC # BLD AUTO: 16.49 THOUSAND/UL (ref 4.31–10.16)

## 2018-08-04 PROCEDURE — 80048 BASIC METABOLIC PNL TOTAL CA: CPT | Performed by: NURSE PRACTITIONER

## 2018-08-04 PROCEDURE — 85025 COMPLETE CBC W/AUTO DIFF WBC: CPT | Performed by: NURSE PRACTITIONER

## 2018-08-04 RX ORDER — DOCUSATE SODIUM 100 MG/1
100 CAPSULE, LIQUID FILLED ORAL 2 TIMES DAILY
Status: DISCONTINUED | OUTPATIENT
Start: 2018-08-04 | End: 2018-08-08

## 2018-08-04 RX ORDER — POTASSIUM CHLORIDE 20 MEQ/1
40 TABLET, EXTENDED RELEASE ORAL ONCE
Status: COMPLETED | OUTPATIENT
Start: 2018-08-04 | End: 2018-08-04

## 2018-08-04 RX ORDER — SODIUM CHLORIDE 1000 MG
1 TABLET, SOLUBLE MISCELLANEOUS 2 TIMES DAILY WITH MEALS
Status: DISCONTINUED | OUTPATIENT
Start: 2018-08-04 | End: 2018-08-05

## 2018-08-04 RX ADMIN — ALPRAZOLAM 0.12 MG: 0.25 TABLET ORAL at 17:48

## 2018-08-04 RX ADMIN — DOCUSATE SODIUM 100 MG: 100 CAPSULE, LIQUID FILLED ORAL at 17:48

## 2018-08-04 RX ADMIN — OXYCODONE HYDROCHLORIDE 5 MG: 5 TABLET ORAL at 12:29

## 2018-08-04 RX ADMIN — POTASSIUM CHLORIDE 40 MEQ: 1500 TABLET, EXTENDED RELEASE ORAL at 17:49

## 2018-08-04 RX ADMIN — ALPRAZOLAM 0.25 MG: 0.25 TABLET ORAL at 00:17

## 2018-08-04 RX ADMIN — OXYCODONE HYDROCHLORIDE 5 MG: 5 TABLET ORAL at 08:26

## 2018-08-04 RX ADMIN — ACETAMINOPHEN 975 MG: 325 TABLET ORAL at 22:27

## 2018-08-04 RX ADMIN — SERTRALINE HYDROCHLORIDE 50 MG: 50 TABLET ORAL at 08:26

## 2018-08-04 RX ADMIN — SENNOSIDES 8.6 MG: 8.6 TABLET, FILM COATED ORAL at 22:27

## 2018-08-04 RX ADMIN — ACETAMINOPHEN 975 MG: 325 TABLET ORAL at 00:19

## 2018-08-04 RX ADMIN — OXYCODONE HYDROCHLORIDE 5 MG: 5 TABLET ORAL at 00:18

## 2018-08-04 RX ADMIN — GABAPENTIN 100 MG: 100 CAPSULE ORAL at 22:27

## 2018-08-04 RX ADMIN — OXYCODONE HYDROCHLORIDE 5 MG: 5 TABLET ORAL at 22:58

## 2018-08-04 RX ADMIN — SODIUM CHLORIDE TAB 1 GM 1 G: 1 TAB at 17:49

## 2018-08-04 RX ADMIN — ENOXAPARIN SODIUM 40 MG: 40 INJECTION SUBCUTANEOUS at 08:26

## 2018-08-04 RX ADMIN — ALPRAZOLAM 0.12 MG: 0.25 TABLET ORAL at 08:27

## 2018-08-04 RX ADMIN — ALPRAZOLAM 0.12 MG: 0.25 TABLET ORAL at 22:27

## 2018-08-04 RX ADMIN — ACETAMINOPHEN 975 MG: 325 TABLET ORAL at 06:25

## 2018-08-04 RX ADMIN — OXYCODONE HYDROCHLORIDE 5 MG: 5 TABLET ORAL at 17:49

## 2018-08-04 RX ADMIN — ACETAMINOPHEN 975 MG: 325 TABLET ORAL at 14:57

## 2018-08-04 NOTE — ASSESSMENT & PLAN NOTE
· No prior history of hyponatremia per family- note patient started on Sertraline 4 weeks ago  · Check sodium studies still remains at 128 at this time on iv fluids 50ml an hr ,will stop as urine studies indicate may be element of siadh along with pt severe aortic valve stenosis  · Possibly hypovolemia component given report of diarrhea- iv fluids decrease rate and start diet   · If sodium worsening or not improving, may need to d/c Sertraline    ·  Hesitant to d/c medication at this time as family reports it has helped her tremendously

## 2018-08-04 NOTE — PROGRESS NOTES
Progress Note - Orthopedics   Batool Carlin 80 y o  female MRN: [id number]  [facility]      Subjective:      80 y  o female with history of sp ffs resulting in a R distal femur periprosthetic fracture  Patient was placed in a knee immobilizer earlier today and new x-ray images were obtained  After review of images and further discussion by the team it was determined that the patient would benefit from non operative management at this time      Labs:    0  Lab Value Date/Time   HCT 32 3 (L) 08/03/2018 0439   HCT 34 2 (L) 08/02/2018 1111   HCT 36 7 10/20/2015 0921   HCT 34 3 (L) 09/22/2015 0640   HCT 38 3 09/21/2015 1342   HGB 10 8 (L) 08/03/2018 0439   HGB 11 6 08/02/2018 1111   HGB 12 4 10/20/2015 0921   HGB 11 1 (L) 09/22/2015 0640   HGB 13 0 09/21/2015 1342   INR 1 02 08/02/2018 1111   WBC 16 19 (H) 08/03/2018 0439   WBC 9 54 08/02/2018 1111   WBC 9 12 10/20/2015 0921   WBC 11 37 (H) 09/22/2015 0640   WBC 11 00 (H) 09/21/2015 1342   ESR 20 10/20/2015 0921       Meds:    Current Facility-Administered Medications:     acetaminophen (TYLENOL) tablet 975 mg, 975 mg, Oral, Q8H [address], Baldemar Padgett PA-C, 975 mg at 08/03/18 1401    ALPRAZolam (XANAX) tablet 0 125 mg, 0 125 mg, Oral, TID, Keyla Manzanares DO, 0 125 mg at 08/03/18 1608    amLODIPine (NORVASC) tablet 10 mg, 10 mg, Oral, Daily, Sharon Flores DO    calcium carbonate (TUMS) chewable tablet 1,000 mg, 1,000 mg, Oral, Daily PRN, Baldemar Padgett PA-C    enoxaparin (LOVENOX) subcutaneous injection 40 mg, 40 mg, Subcutaneous, Daily, Patito Starks PA-C    gabapentin (NEURONTIN) capsule 100 mg, 100 mg, Oral, HS, Keyla Manzanares DO    HYDROmorphone (DILAUDID) injection 0 2 mg, 0 2 mg, Intravenous, Q2H PRN, Keyla Manzanares DO, 0 2 mg at 08/03/18 1401    ondansetron (ZOFRAN) injection 4 mg, 4 mg, Intravenous, Q6H PRN, Baldemar Padgett PA-C    oxyCODONE (ROXICODONE) IR tablet 5 mg, 5 mg, Oral, 4x Daily, Keyla Manzanares DO, 5 mg at 08/03/18 1814    senna (SENOKOT) tablet 8 6 mg, 1 tablet, Oral, HS, Keyla Manzanares,     sertraline (ZOLOFT) tablet 50 mg, 50 mg, Oral, Daily, Barrington Swain PA-C, 50 mg at 08/03/18 0835    sodium chloride 0 9 % infusion, 50 mL/hr, Intravenous, Continuous, ROMERO Richmond, Last Rate: 50 mL/hr at 08/03/18 1607, 50 mL/hr at 08/03/18 1607    Blood Culture:   No results found for: BLOODCX    Wound Culture:   No results found for: WOUNDCULT    Ins and Outs:  I/O last 24 hours: In: 0   Out: 575 [Urine:575]          Physical:  Vitals:    08/03/18 1500   BP: 139/63   Pulse: 86   Resp: 18   Temp: 98 1 °F (36 7 °C)   SpO2: 90%     Musculoskeletal: right Lower Extremity  · Skin intact    · TTP over R knee and R femur  · neurovascularly throughout RLE      _*_*_*_*_*_*_*_*_*_*_*_*_*_*_*_*_*_*_*_*_*_*_*_*_*_*_*_*_*_*_*_*_*_*_*_*_*_*_*_*_*    Assessment:    89 y  o female sp fall from standing resulting in R distal femur periprosthetic fracture  Plan:  · NWB RLE  · PT/OT as per recommendation by primary team  · Pain control as per primary team recommendation  · Dispo: Ortho signing off patient will be given appointment for follow-up in clinic      Kiana Hayes MD

## 2018-08-04 NOTE — ASSESSMENT & PLAN NOTE
· Distal femoral periprosthetic fracture, in brace per ortho with no surgical intervention planned  · Ortho evaluation appreciated discussed with ortho resident on call who states he will call the family as they have not been updated in regard to professional ortho explanation what will happen moving forward and how to manage   · Pain control, pt is in pain w

## 2018-08-04 NOTE — ASSESSMENT & PLAN NOTE
· Treated with antibiotics for UTI in July  · Pt has no complaints of diarrhea now  Feels maybe a little constipated will monitor on bowel regimen   · Pain meds on board atc will need to monitor mental status and constipation

## 2018-08-04 NOTE — ASSESSMENT & PLAN NOTE
· Most recent echo on our system from 2015 states moderate AS but family states she had recent echo which showed severe AS  · If surgery planned, likely needs cardiac clearance however discussed with ortho and cards will hold off on consult now as pt not for or   · Echo completed with ef of 60 % pt noted to have critical aortic stenosis

## 2018-08-05 LAB
ALBUMIN SERPL BCP-MCNC: 2.5 G/DL (ref 3.5–5)
ALP SERPL-CCNC: 117 U/L (ref 46–116)
ALT SERPL W P-5'-P-CCNC: 52 U/L (ref 12–78)
ANION GAP SERPL CALCULATED.3IONS-SCNC: 5 MMOL/L (ref 4–13)
ANION GAP SERPL CALCULATED.3IONS-SCNC: 6 MMOL/L (ref 4–13)
AST SERPL W P-5'-P-CCNC: 30 U/L (ref 5–45)
BASOPHILS # BLD AUTO: 0.05 THOUSANDS/ΜL (ref 0–0.1)
BASOPHILS # BLD AUTO: 0.05 THOUSANDS/ΜL (ref 0–0.1)
BASOPHILS NFR BLD AUTO: 0 % (ref 0–1)
BASOPHILS NFR BLD AUTO: 0 % (ref 0–1)
BILIRUB SERPL-MCNC: 0.65 MG/DL (ref 0.2–1)
BUN SERPL-MCNC: 8 MG/DL (ref 5–25)
BUN SERPL-MCNC: 8 MG/DL (ref 5–25)
CALCIUM SERPL-MCNC: 8.1 MG/DL (ref 8.3–10.1)
CALCIUM SERPL-MCNC: 8.3 MG/DL (ref 8.3–10.1)
CHLORIDE SERPL-SCNC: 92 MMOL/L (ref 100–108)
CHLORIDE SERPL-SCNC: 93 MMOL/L (ref 100–108)
CO2 SERPL-SCNC: 27 MMOL/L (ref 21–32)
CO2 SERPL-SCNC: 28 MMOL/L (ref 21–32)
CREAT SERPL-MCNC: 0.33 MG/DL (ref 0.6–1.3)
CREAT SERPL-MCNC: 0.36 MG/DL (ref 0.6–1.3)
EOSINOPHIL # BLD AUTO: 1.28 THOUSAND/ΜL (ref 0–0.61)
EOSINOPHIL # BLD AUTO: 1.28 THOUSAND/ΜL (ref 0–0.61)
EOSINOPHIL NFR BLD AUTO: 8 % (ref 0–6)
EOSINOPHIL NFR BLD AUTO: 9 % (ref 0–6)
ERYTHROCYTE [DISTWIDTH] IN BLOOD BY AUTOMATED COUNT: 14.7 % (ref 11.6–15.1)
ERYTHROCYTE [DISTWIDTH] IN BLOOD BY AUTOMATED COUNT: 14.8 % (ref 11.6–15.1)
GFR SERPL CREATININE-BSD FRML MDRD: 95 ML/MIN/1.73SQ M
GFR SERPL CREATININE-BSD FRML MDRD: 98 ML/MIN/1.73SQ M
GLUCOSE SERPL-MCNC: 75 MG/DL (ref 65–140)
GLUCOSE SERPL-MCNC: 91 MG/DL (ref 65–140)
HCT VFR BLD AUTO: 27.2 % (ref 34.8–46.1)
HCT VFR BLD AUTO: 29.8 % (ref 34.8–46.1)
HGB BLD-MCNC: 9.3 G/DL (ref 11.5–15.4)
HGB BLD-MCNC: 9.8 G/DL (ref 11.5–15.4)
IMM GRANULOCYTES # BLD AUTO: 0.08 THOUSAND/UL (ref 0–0.2)
IMM GRANULOCYTES # BLD AUTO: 0.11 THOUSAND/UL (ref 0–0.2)
IMM GRANULOCYTES NFR BLD AUTO: 1 % (ref 0–2)
IMM GRANULOCYTES NFR BLD AUTO: 1 % (ref 0–2)
LYMPHOCYTES # BLD AUTO: 1.27 THOUSANDS/ΜL (ref 0.6–4.47)
LYMPHOCYTES # BLD AUTO: 2.36 THOUSANDS/ΜL (ref 0.6–4.47)
LYMPHOCYTES NFR BLD AUTO: 16 % (ref 14–44)
LYMPHOCYTES NFR BLD AUTO: 9 % (ref 14–44)
MCH RBC QN AUTO: 29.9 PG (ref 26.8–34.3)
MCH RBC QN AUTO: 30.6 PG (ref 26.8–34.3)
MCHC RBC AUTO-ENTMCNC: 32.9 G/DL (ref 31.4–37.4)
MCHC RBC AUTO-ENTMCNC: 34.2 G/DL (ref 31.4–37.4)
MCV RBC AUTO: 90 FL (ref 82–98)
MCV RBC AUTO: 91 FL (ref 82–98)
MONOCYTES # BLD AUTO: 0.93 THOUSAND/ΜL (ref 0.17–1.22)
MONOCYTES # BLD AUTO: 0.95 THOUSAND/ΜL (ref 0.17–1.22)
MONOCYTES NFR BLD AUTO: 6 % (ref 4–12)
MONOCYTES NFR BLD AUTO: 7 % (ref 4–12)
NEUTROPHILS # BLD AUTO: 10.53 THOUSANDS/ΜL (ref 1.85–7.62)
NEUTROPHILS # BLD AUTO: 10.94 THOUSANDS/ΜL (ref 1.85–7.62)
NEUTS SEG NFR BLD AUTO: 69 % (ref 43–75)
NEUTS SEG NFR BLD AUTO: 74 % (ref 43–75)
NRBC BLD AUTO-RTO: 0 /100 WBCS
NRBC BLD AUTO-RTO: 0 /100 WBCS
PLATELET # BLD AUTO: 243 THOUSANDS/UL (ref 149–390)
PLATELET # BLD AUTO: 253 THOUSANDS/UL (ref 149–390)
PMV BLD AUTO: 10.2 FL (ref 8.9–12.7)
PMV BLD AUTO: 10.5 FL (ref 8.9–12.7)
POTASSIUM SERPL-SCNC: 3.9 MMOL/L (ref 3.5–5.3)
POTASSIUM SERPL-SCNC: 4.2 MMOL/L (ref 3.5–5.3)
PROT SERPL-MCNC: 6.2 G/DL (ref 6.4–8.2)
RBC # BLD AUTO: 3.04 MILLION/UL (ref 3.81–5.12)
RBC # BLD AUTO: 3.28 MILLION/UL (ref 3.81–5.12)
SODIUM SERPL-SCNC: 125 MMOL/L (ref 136–145)
SODIUM SERPL-SCNC: 126 MMOL/L (ref 136–145)
WBC # BLD AUTO: 14.6 THOUSAND/UL (ref 4.31–10.16)
WBC # BLD AUTO: 15.23 THOUSAND/UL (ref 4.31–10.16)

## 2018-08-05 PROCEDURE — 80048 BASIC METABOLIC PNL TOTAL CA: CPT | Performed by: NURSE PRACTITIONER

## 2018-08-05 PROCEDURE — 80053 COMPREHEN METABOLIC PANEL: CPT | Performed by: NURSE PRACTITIONER

## 2018-08-05 PROCEDURE — 85025 COMPLETE CBC W/AUTO DIFF WBC: CPT | Performed by: NURSE PRACTITIONER

## 2018-08-05 PROCEDURE — 99232 SBSQ HOSP IP/OBS MODERATE 35: CPT | Performed by: NURSE PRACTITIONER

## 2018-08-05 RX ORDER — SODIUM CHLORIDE 1000 MG
1 TABLET, SOLUBLE MISCELLANEOUS
Status: DISCONTINUED | OUTPATIENT
Start: 2018-08-06 | End: 2018-08-08

## 2018-08-05 RX ADMIN — OXYCODONE HYDROCHLORIDE 5 MG: 5 TABLET ORAL at 21:28

## 2018-08-05 RX ADMIN — SENNOSIDES 8.6 MG: 8.6 TABLET, FILM COATED ORAL at 21:28

## 2018-08-05 RX ADMIN — SERTRALINE HYDROCHLORIDE 50 MG: 50 TABLET ORAL at 09:05

## 2018-08-05 RX ADMIN — ALPRAZOLAM 0.12 MG: 0.25 TABLET ORAL at 15:06

## 2018-08-05 RX ADMIN — ENOXAPARIN SODIUM 40 MG: 40 INJECTION SUBCUTANEOUS at 09:04

## 2018-08-05 RX ADMIN — DOCUSATE SODIUM 100 MG: 100 CAPSULE, LIQUID FILLED ORAL at 09:05

## 2018-08-05 RX ADMIN — ACETAMINOPHEN 975 MG: 325 TABLET ORAL at 21:28

## 2018-08-05 RX ADMIN — DOCUSATE SODIUM 100 MG: 100 CAPSULE, LIQUID FILLED ORAL at 17:45

## 2018-08-05 RX ADMIN — ALPRAZOLAM 0.12 MG: 0.25 TABLET ORAL at 21:28

## 2018-08-05 RX ADMIN — ACETAMINOPHEN 650 MG: 325 TABLET ORAL at 05:55

## 2018-08-05 RX ADMIN — GABAPENTIN 100 MG: 100 CAPSULE ORAL at 21:28

## 2018-08-05 RX ADMIN — SODIUM CHLORIDE TAB 1 GM 1 G: 1 TAB at 09:04

## 2018-08-05 RX ADMIN — OXYCODONE HYDROCHLORIDE 5 MG: 5 TABLET ORAL at 11:30

## 2018-08-05 RX ADMIN — OXYCODONE HYDROCHLORIDE 5 MG: 5 TABLET ORAL at 17:45

## 2018-08-05 RX ADMIN — OXYCODONE HYDROCHLORIDE 5 MG: 5 TABLET ORAL at 09:05

## 2018-08-05 RX ADMIN — ALPRAZOLAM 0.12 MG: 0.25 TABLET ORAL at 09:05

## 2018-08-05 RX ADMIN — SODIUM CHLORIDE TAB 1 GM 1 G: 1 TAB at 17:45

## 2018-08-05 RX ADMIN — ACETAMINOPHEN 975 MG: 325 TABLET ORAL at 15:00

## 2018-08-05 NOTE — ASSESSMENT & PLAN NOTE
· Distal femoral periprosthetic fracture, in brace per ortho with no surgical intervention planned  · Ortho evaluation appreciated discussed with ortho resident on call to call back as pts other son had arrived and had many question in regard to the method on how pts leg has been immobilized (pts son is a horse orthopedic surgeon) he had many questions in regard to methods     · Pain control, pt is in pain and on atc medications with xanax around the clock mental status is labile some confusion and definitely still with pain and increased anxiety at times

## 2018-08-05 NOTE — PROGRESS NOTES
Progress Note - Ayesha Paris 7/20/1928, 80 y o  female MRN: [id number]    Unit/Bed#: [facility] 911-01 Encounter: [id number]    Primary Care Provider: ROMERO Madison   Date and time admitted to hospital: 8/2/2018  7:32 PM        * Femoral fracture [facility])   Assessment & Plan    · Distal femoral periprosthetic fracture, in brace per ortho with no surgical intervention planned  · Ortho evaluation appreciated discussed with ortho resident on call who states he will call the family as they have not been updated in regard to professional ortho explanation what will happen moving forward and how to manage   · Pain control, pt is in pain w          Diarrhea   Assessment & Plan    · Treated with antibiotics for UTI in July  · Pt has no complaints of diarrhea now  Feels maybe a little constipated will monitor on bowel regimen   · Pain meds on board atc will need to monitor mental status and constipation         Hyponatremia   Assessment & Plan    · No prior history of hyponatremia per family- note patient started on Sertraline 4 weeks ago  · Check sodium studies still remains at 128 at this time on iv fluids 50ml an hr ,will stop as urine studies indicate may be element of siadh along with pt severe aortic valve stenosis  · Possibly hypovolemia component given report of diarrhea- iv fluids decrease rate and start diet   · If sodium worsening or not improving, may need to d/c Sertraline    ·  Hesitant to d/c medication at this time as family reports it has helped her tremendously        Severe aortic stenosis   Assessment & Plan    · Most recent echo on our system from 2015 states moderate AS but family states she had recent echo which showed severe AS  · If surgery planned, likely needs cardiac clearance however discussed with ortho and cards will hold off on consult now as pt not for or   · Echo completed with ef of 60 % pt noted to have critical aortic stenosis             VTE Pharmacologic Prophylaxis:   Pharmacologic: Enoxaparin (Lovenox)  Mechanical VTE Prophylaxis in Place: Yes    Patient Centered Rounds: I have performed bedside rounds with nursing staff today  Discussions with Specialists or Other Care Team Provider: nursing    Education and Discussions with Family / Patient: patient     Time Spent for Care: 30 minutes  More than 50% of total time spent on counseling and coordination of care as described above  Current Length of Stay: 3 day(s)    Current Patient Status: Inpatient   Certification Statement: The patient will continue to require additional inpatient hospital stay due to pending eval from pt    Discharge Plan: rehab    Code Status: Level 3 - DNAR and DNI      Subjective:   Long talk with ortho on phone who are agreeable to calling the son over the phone  Son and wife have been waiting for input to make decision  Pt with pain in right leg will talk with palliative  A little confused  Over 30 minutes in the room talking with family    Objective:     Vitals:   Temp (24hrs), Av 9 °F (36 6 °C), Min:97 6 °F (36 4 °C), Max:98 1 °F (36 7 °C)    HR:  [73-81] 81  Resp:  [18-20] 18  BP: (115-123)/(58-60) 123/58  SpO2:  [93 %-96 %] 95 %  Body mass index is 22 11 kg/m²  Input and Output Summary (last 24 hours): Intake/Output Summary (Last 24 hours) at 18 0533  Last data filed at 18 2100   Gross per 24 hour   Intake              721 ml   Output              500 ml   Net              221 ml       Physical Exam:     Physical Exam   Constitutional: She appears well-developed  No distress  HENT:   Head: Normocephalic  Eyes: Conjunctivae are normal  Right eye exhibits no discharge  Left eye exhibits no discharge  No scleral icterus  Neck: No JVD present  No tracheal deviation present  No thyromegaly present  Cardiovascular: Normal rate and regular rhythm  Exam reveals no gallop and no friction rub  Murmur heard  Pulmonary/Chest: She exhibits tenderness     Abdominal: She exhibits no distension  There is no tenderness  There is no rebound and no guarding  Musculoskeletal: She exhibits deformity (right leg in brace)  She exhibits no edema or tenderness  Lymphadenopathy:     She has no cervical adenopathy  Neurological: She is alert  2 -3   Skin: No rash noted  No erythema  No pallor  Additional Data:     Labs:      Results from last 7 days  Lab Units 08/04/18  0445   WBC Thousand/uL 16 49*   HEMOGLOBIN g/dL 9 3*   HEMATOCRIT % 27 6*   PLATELETS Thousands/uL 241   NEUTROS PCT % 78*   LYMPHS PCT % 12*   MONOS PCT % 9   EOS PCT % 1       Results from last 7 days  Lab Units 08/04/18  0445  08/02/18  1111   SODIUM mmol/L 129*  < > 128*   POTASSIUM mmol/L 3 2*  < > 4 6   CHLORIDE mmol/L 95*  < > 95*   CO2 mmol/L 27  < > 28   BUN mg/dL 8  < > 13   CREATININE mg/dL 0 37*  < > 0 55*   CALCIUM mg/dL 8 4  < > 9 1   TOTAL PROTEIN g/dL  --   --  7 3   BILIRUBIN TOTAL mg/dL  --   --  0 30   ALK PHOS U/L  --   --  74   ALT U/L  --   --  18   AST U/L  --   --  21   GLUCOSE RANDOM mg/dL 103  < > 99   < > = values in this interval not displayed  Results from last 7 days  Lab Units 08/02/18  1111   INR  1 02                 * I Have Reviewed All Lab Data Listed Above  * Additional Pertinent Lab Tests Reviewed:  All Labs Within Last 24 Hours Reviewed    Imaging:    Imaging Reports Reviewed Today Include: reviewed       Recent Cultures (last 7 days):           Last 24 Hours Medication List:     Current Facility-Administered Medications:  acetaminophen 975 mg Oral Q8H [facility] Gayle Olivera PA-C   ALPRAZolam 0 125 mg Oral TID Keyla M Bendas, DO   amLODIPine 10 mg Oral Daily Mikki Hodges,    calcium carbonate 1,000 mg Oral Daily PRN Gayle Olivera PA-C   docusate sodium 100 mg Oral BID ROMERO Driscoll   enoxaparin 40 mg Subcutaneous Daily Gayle Olivera PA-C   gabapentin 100 mg Oral HS Keyla M Bendas, DO   HYDROmorphone 0 2 mg Intravenous Q2H PRN Keyla M Bendas, DO   ondansetron 4 mg Intravenous Q6H PRN Adolphus Boas, PA-C   oxyCODONE 5 mg Oral 4x Daily Kelya Manzanares DO   senna 1 tablet Oral HS Keylaizabela Manzanares,    sertraline 50 mg Oral Daily Adolphus Boas, PA-C   sodium chloride 1 g Oral BID With Meals Dandy Bryant MD        Today, Patient Was Seen By: ROMERO Owens    ** Please Note: Dictation voice to text software may have been used in the creation of this document   **

## 2018-08-06 PROBLEM — R13.10 DYSPHAGIA: Status: ACTIVE | Noted: 2018-08-06

## 2018-08-06 PROBLEM — R52 PAIN: Status: ACTIVE | Noted: 2018-08-06

## 2018-08-06 PROBLEM — Z71.89 GOALS OF CARE, COUNSELING/DISCUSSION: Status: ACTIVE | Noted: 2018-08-06

## 2018-08-06 LAB
ANION GAP SERPL CALCULATED.3IONS-SCNC: 7 MMOL/L (ref 4–13)
BUN SERPL-MCNC: 9 MG/DL (ref 5–25)
CALCIUM SERPL-MCNC: 8.1 MG/DL (ref 8.3–10.1)
CHLORIDE SERPL-SCNC: 90 MMOL/L (ref 100–108)
CO2 SERPL-SCNC: 28 MMOL/L (ref 21–32)
CREAT SERPL-MCNC: 0.34 MG/DL (ref 0.6–1.3)
GFR SERPL CREATININE-BSD FRML MDRD: 97 ML/MIN/1.73SQ M
GLUCOSE SERPL-MCNC: 115 MG/DL (ref 65–140)
POTASSIUM SERPL-SCNC: 3.5 MMOL/L (ref 3.5–5.3)
SODIUM SERPL-SCNC: 125 MMOL/L (ref 136–145)

## 2018-08-06 PROCEDURE — 99232 SBSQ HOSP IP/OBS MODERATE 35: CPT | Performed by: NURSE PRACTITIONER

## 2018-08-06 PROCEDURE — G8987 SELF CARE CURRENT STATUS: HCPCS

## 2018-08-06 PROCEDURE — 97760 ORTHOTIC MGMT&TRAING 1ST ENC: CPT

## 2018-08-06 PROCEDURE — 80048 BASIC METABOLIC PNL TOTAL CA: CPT | Performed by: NURSE PRACTITIONER

## 2018-08-06 PROCEDURE — 97530 THERAPEUTIC ACTIVITIES: CPT

## 2018-08-06 PROCEDURE — G8988 SELF CARE GOAL STATUS: HCPCS

## 2018-08-06 PROCEDURE — 97163 PT EVAL HIGH COMPLEX 45 MIN: CPT

## 2018-08-06 PROCEDURE — 99232 SBSQ HOSP IP/OBS MODERATE 35: CPT | Performed by: INTERNAL MEDICINE

## 2018-08-06 PROCEDURE — 97167 OT EVAL HIGH COMPLEX 60 MIN: CPT

## 2018-08-06 RX ADMIN — SENNOSIDES 8.6 MG: 8.6 TABLET, FILM COATED ORAL at 23:56

## 2018-08-06 RX ADMIN — OXYCODONE HYDROCHLORIDE 5 MG: 5 TABLET ORAL at 13:17

## 2018-08-06 RX ADMIN — SERTRALINE HYDROCHLORIDE 50 MG: 50 TABLET ORAL at 09:04

## 2018-08-06 RX ADMIN — OXYCODONE HYDROCHLORIDE 5 MG: 5 TABLET ORAL at 18:33

## 2018-08-06 RX ADMIN — GABAPENTIN 100 MG: 100 CAPSULE ORAL at 23:56

## 2018-08-06 RX ADMIN — ACETAMINOPHEN 975 MG: 325 TABLET ORAL at 05:33

## 2018-08-06 RX ADMIN — AMLODIPINE BESYLATE 10 MG: 10 TABLET ORAL at 09:04

## 2018-08-06 RX ADMIN — ACETAMINOPHEN 975 MG: 325 TABLET ORAL at 23:55

## 2018-08-06 RX ADMIN — ENOXAPARIN SODIUM 40 MG: 40 INJECTION SUBCUTANEOUS at 09:04

## 2018-08-06 RX ADMIN — ALPRAZOLAM 0.12 MG: 0.25 TABLET ORAL at 09:04

## 2018-08-06 RX ADMIN — SODIUM CHLORIDE TAB 1 GM 1 G: 1 TAB at 17:03

## 2018-08-06 RX ADMIN — ALPRAZOLAM 0.12 MG: 0.25 TABLET ORAL at 17:00

## 2018-08-06 RX ADMIN — DOCUSATE SODIUM 100 MG: 100 CAPSULE, LIQUID FILLED ORAL at 17:03

## 2018-08-06 RX ADMIN — SODIUM CHLORIDE TAB 1 GM 1 G: 1 TAB at 09:04

## 2018-08-06 RX ADMIN — SODIUM CHLORIDE TAB 1 GM 1 G: 1 TAB at 13:16

## 2018-08-06 RX ADMIN — OXYCODONE HYDROCHLORIDE 5 MG: 5 TABLET ORAL at 09:04

## 2018-08-06 RX ADMIN — ALPRAZOLAM 0.12 MG: 0.25 TABLET ORAL at 23:56

## 2018-08-06 RX ADMIN — OXYCODONE HYDROCHLORIDE 5 MG: 5 TABLET ORAL at 23:56

## 2018-08-06 RX ADMIN — ONDANSETRON 4 MG: 2 INJECTION, SOLUTION INTRAMUSCULAR; INTRAVENOUS at 22:15

## 2018-08-06 RX ADMIN — ACETAMINOPHEN 975 MG: 325 TABLET ORAL at 13:17

## 2018-08-06 RX ADMIN — DOCUSATE SODIUM 100 MG: 100 CAPSULE, LIQUID FILLED ORAL at 09:04

## 2018-08-06 NOTE — ORTHOTIC NOTE
Orthotic Note            Date: 8/6/2018      Patient Name: Antonette Azevedo        Time: 14:45pm    Reason for Consult:  Patient Active Problem List   Diagnosis    Femoral fracture (HCC)    Severe aortic stenosis    Hyponatremia    Diarrhea    Hypertension    Hand pain, right    Pain    Goals of care, counseling/discussion   Breg G3 Cool Hinged Knee Brace Pediatric [id number]      I measured, fit , and donned Breg G3 Cool Hinged knee Brace locked in extension to patient's RLE while she was in sitting up in bed and then adjusted while in chair  Patient tolerated well and instructions/adjustments reviewed while family present in room  I will continue to follow up daily  RN aware  My contact information and instructions at bedside  Recommendations:  Please call Mobility Coordinator at ext  [phone] in regards to bracing instruction and/or adjustment  Pineda Cabello Mobility Coordinator BENIFo, LCOF, ASOP R  O T, O B T

## 2018-08-06 NOTE — ASSESSMENT & PLAN NOTE
· No prior history of hyponatremia per family- note patient started on Sertraline 4 weeks ago  · sodium studies still remains at 128 at this time ,  urine studies indicate may be element of siadh along with pt severe aortic valve stenosis  · Possibly hypovolemia component given report of diarrhea- iv fluids decrease rate and start diet   · If sodium worsening or not improving, may need to d/c Sertraline,but   ·  Hesitant to d/c medication at this time as family reports it has helped her tremendously  · = pt started on salt tabs bid yesterday increased to tid today   · =chk labs in the am

## 2018-08-06 NOTE — CASE MANAGEMENT
Continued Stay Review    Date: 8/6    Vital Signs: /62 (BP Location: Right arm)   Pulse 80   Temp 97 9 °F (36 6 °C) (Oral)   Resp 18   Ht 5' 1" (1 549 m)   Wt 53 1 kg (117 lb)   SpO2 94%   BMI 22 11 kg/m²     Medications:   Scheduled Meds:   Current Facility-Administered Medications:  acetaminophen 975 mg Oral Q8H [address]   ALPRAZolam 0 125 mg Oral TID   amLODIPine 10 mg Oral Daily   docusate sodium 100 mg Oral BID   enoxaparin 40 mg Subcutaneous Daily   gabapentin 100 mg Oral HS   oxyCODONE 5 mg Oral 4x Daily   senna 1 tablet Oral HS   sertraline 50 mg Oral Daily   sodium chloride 1 g Oral TID With Meals     Continuous Infusions:    PRN Meds: calcium carbonate    HYDROmorphone    ondansetron    Abnormal Labs/Diagnostic Results:     Age/Sex: 80 y o  female     Assessment/Plan:   8/6 Palliative & Supportive Care progress notes:    Goals of care, counseling/discussion   Assessment & Plan     - Level 3  - awaiting PT/OT recommendations           Pain   Assessment & Plan     - continue oxycodone 5 mg q4H ATC with strict hold parameters  - continue gabapentin 100 mg qHS  - continue Tylenol 975 mg PO TID  - continue Lidoderm patch and ice packs  - D/C IV Dilaudid and change to oxyIR for PRN use- PLEASE ADMINISTER 30 MINUTES PRIOR TO PT/OT  - bowel regimen to prevent OIC          * Femoral fracture (HCC)   Assessment & Plan     - plan for non-operative management at this time  - PT/OT evaluation pending       Discharge Plan: Pending PT/OT eval and treat

## 2018-08-06 NOTE — OCCUPATIONAL THERAPY NOTE
[address]  Evaluation     Patient Name: Willie Conner  RNMQK'S Date: 2018  Problem List  Patient Active Problem List   Diagnosis    Femoral fracture ([address] )    Severe aortic stenosis    Hyponatremia    Diarrhea    Hypertension    Hand pain, right    Pain    Goals of care, counseling/discussion     Past Medical History  Past Medical History:   Diagnosis Date    Hypertension     Psychiatric disorder     anxiety     Past Surgical History  Past Surgical History:   Procedure Laterality Date     SECTION      CHOLECYSTECTOMY      REPLACEMENT TOTAL KNEE           18 1351   Note Type   Note type Eval/Treat   Restrictions/Precautions   Weight Bearing Precautions Per Order Yes   RLE Weight Bearing Per Order NWB  (in [address])   Braces or Orthoses LE Braces   Other Precautions Cognitive; Chair Alarm; Bed Alarm;WBS;Fall Risk;Pain  (NWB status on R LE )   Pain Assessment   Pain Assessment 0-10   Pain Score Worst Possible Pain   Hospital Pain Intervention(s) Repositioned; Ambulation/increased activity   Response to Interventions increased pain with movement    Pain Rating: FLACC (Rest) - Face 1   Pain Rating: FLACC (Rest) - Legs 0   Pain Rating: FLACC (Rest) - Activity 0   Pain Rating: FLACC (Rest) - Cry 1   Pain Rating: FLACC (Rest) - Consolability 1   Score: FLACC (Rest) 3   Pain Rating: FLACC (Activity) - Face 1   Pain Rating: FLACC (Activity) - Legs 1   Pain Rating: FLACC (Activity) - Activity 1   Pain Rating: FLACC (Activity) - Cry 1   Pain Rating: FLACC (Activity) - Consolability 1   Score: FLACC (Activity) 5   Home Living   Type of Home House   Home Layout One level;Performs ADLs on one level;Ramped entrance   Bathroom Shower/Tub Tub/shower unit   Bathroom Toilet Standard   Bathroom Equipment Tub transfer bench   Bathroom Accessibility Accessible   Home Equipment Walker;Cane   Additional Comments Use of RW in the home; [facility] use for short distances during community mobilty    Prior Function Level of [place] Needs assistance with IADLs; Needs assistance with ADLs and functional mobility   Lives With Family   Receives Help From Family;Home health   ADL Assistance Needs assistance   IADLs Needs assistance   Falls in the last 6 months 1 to 4   Vocational Retired   Lifestyle   Autonomy Per daughter in law, pt requires assistance with IADLs  Daughter in law reprts use of home health aide twice/week: one day for 7 hours and another day for 3 hours to assist with showering and dressing  Reciprocal Relationships Pt lives with her son linda daughter in law  Son and daughter in law provide assistance when needed upon d/c  Pt also receives assistance from home health aide two times a week  Service to Others Pt is retired    Intrinsic Gratification Pt enjoys spending time with her dog and painting  Subjective   Subjective Pt reoprts, "i know it is going to hurt"   ADL   Eating Assistance 5  Supervision/Setup   Grooming Assistance 5  Supervision/Setup   UB Bathing Assistance 4  Minimal Assistance   LB Bathing Assistance 2  Maximal Assistance   [address] 4  Minimal [address] 2  Maximal [address]  2  Maximal Assistance   Functional Assistance 2  Maximal Assistance   Bed Mobility   Supine to Sit 1  Dependent   Additional items Assist x 2; Increased time required;Verbal cues;LE management   Transfers   Sit to Stand 1  Dependent   Additional items Assist x 2; Increased time required;Verbal cues   Stand to Sit 1  Dependent   Additional items Assist x 2; Increased time required;Verbal cues   Stand pivot 1  Dependent   Additional items Assist x 2; Increased time required;Verbal cues   Additional Comments with HHA    Balance   Static Sitting Fair   Dynamic Sitting Fair -   Static Standing Poor -   Dynamic Standing Poor -   Activity Tolerance   Activity Tolerance Patient limited by pain   Medical Staff Made Aware PT; CM    Nurse Made Aware Appropriate to see per RN    RUE Assessment   RUE Assessment WFL   LUE Assessment   LUE Assessment WFL   Hand Function   Gross Motor Coordination Functional   Fine Motor Coordination Functional   Sensation   Light Touch No apparent deficits   Vision-Basic Assessment   Current Vision Wears glasses all the time   Cognition   Overall Cognitive Status Impaired   Arousal/Participation Alert; Responsive   Attention Attends with cues to redirect   Orientation Level Oriented to person;Oriented to place; Disoriented to time;Disoriented to situation   Memory Decreased recall of precautions;Decreased recall of recent events   Following Commands Follows one step commands inconsistently   Comments Pt is pleasant to work with work  Pt educated on NWB status on R LE at beginning of session  Pt presented as very fearful/anxious with movement of right knee  Pt easily distracted by pain and required verbal cues for redirection  Chair alarm on  Assessment   Limitation Decreased ADL status; Decreased Safe judgement during ADL;Decreased cognition;Decreased endurance;Decreased self-care trans;Decreased high-level ADLs   Prognosis Good   Assessment Pt is a 80 y o  female seen for inital OT evaluation after sustaining a right distal femoral periprosthetic fracture as a result of a fall  Per ortho, pt would benefit from non operative management  Pt is NWB on R LE in [facility]  Pt's past medical history includes hypertension, anxiety, and TKA 16 years ago  Pt lives in an one level home with her son and daughter in law  Pt reports use of RW in the home, [facility] for short distances during community mobility, and tub transfer chair  Pt requires assistance with IADLs from son and daughter in law  Per daughter in law, a home health aide comes twice a week: one day for 7 hours and the other day for 3 hours  The home health aide provides assistance for dressing and bathing  Pt is retired and enjoys spending time with her dog and painting   Pt supine at beginning of session reporting 10/10 pain in right knee  Pt required emotional support and encouragement 2' fear/anxiety with movement of right knee  Pt performed bed mobility at dependent x2  Pt able to sit EOB for approximately 6-8 minutes  Initally, pt was distracted by pain and required MOD A for sitting balance  When pt distracted by therapist, pt able to sit EOB at close supervision  Pt performed sit<->stand transfer/stand pivot transfer at dependent x2 with HHA  Pt left in chair at end of session with all needs in reach + chair alarm on  Pt able to recall use of call bell  Pt required verbal cues for carryover of energy conservation techniques t/o session  Pt also easily distracted by increased pain and required verbal cues for redirection  At this time, pt is overall at [facility] A for UB ADLs and MAX A for LB ADLs  Pt's limitations include pain, fear/anxiety 2' movement, decreased activity tolerance, impaired balance, easily distracted, limited insight into deficits, impaired safety awareness, impaired judgement, NWB status on R LE, weakness, fall risk, and self limiting  Will continue to F/U to address OT established goals  From an OT perspective, pt will benefit from inpt rehab  Goals   Patient Goals to have less pain    LTG Time Frame 10-14   Long Term Goal #1 See below    Plan   Treatment Interventions ADL retraining;Functional transfer training; Endurance training;Cognitive reorientation;Patient/family training;Equipment evaluation/education; Compensatory technique education; Energy conservation; Activityengagement   Goal Expiration Date 08/20/18   OT Frequency 3-5x/wk   Recommendation   OT Discharge Recommendation Short Term Rehab   OT - OK to Discharge Yes  (when medically cleared )   Barthel Index   Feeding 10   Bathing 0   Grooming Score 0   Dressing Score 0   Bladder Score 10   Bowels Score 10   Toilet Use Score 0   Transfers (Bed/Chair) Score 5   Mobility (Level Surface) Score 0   Stairs Score 0   Barthel Index Score 35 Modified Nely Scale   Modified Brown Scale 4       Pt/family will be 100% attentive during pt/family education on energy conservation techniques and compensatory strategies with G carryover t/o functional tasks to assist in safe d/c planning  Pt will perform bed mobility/sit<->stand transfers/stand pivot transfers/sit pivot transfers at MOD A with G balance and G carryover of NWB status on R LE  Pt will perform UB ADLs at S level with G balance  Pt will perform LB ADLs at MIN level with G balance and G carryover of NWB status on R LE while sitting EOB/supine  Pt will improve activity tolerance to 20 minutes of sustained functional transfers to increase participation in ADLs  Pt will sit EOB while completing grooming activity at S level with G balance for approximately 20 minutes  Pt will be attentive during a 10 minute functional task with MIN v/c's to redirect  Pt will identify 3 coping strategies to improve mental status during therapy session       Patricia Ugarte OTS

## 2018-08-06 NOTE — SOCIAL WORK
Cm reviewed patient during care coordination rounds  Patient is not stable for d /c today  Cm informed by therapies that patient will need rehab  Cm met with patient and family to discuss rehab choices  Per family they would like a referral to [facility] placed referral

## 2018-08-06 NOTE — ASSESSMENT & PLAN NOTE
- continue oxycodone 5 mg q4H ATC with strict hold parameters  - continue gabapentin 100 mg qHS  - continue Tylenol 975 mg PO TID  - continue Lidoderm patch and ice packs  - D/C IV Dilaudid and change to oxyIR for PRN use- PLEASE ADMINISTER 30 MINUTES PRIOR TO PT/OT  - bowel regimen to prevent OIC

## 2018-08-06 NOTE — PHYSICAL THERAPY NOTE
Physical Therapy Evaluation    Patient's Name:Bell Bess    Today's Date:18    Patient Active Problem List   Diagnosis    Femoral fracture (HCC)    Severe aortic stenosis    Hyponatremia    Diarrhea    Hypertension    Hand pain, right    Pain    Goals of care, counseling/discussion       Past Medical History:   Diagnosis Date    Hypertension     Psychiatric disorder     anxiety       Past Surgical History:   Procedure Laterality Date     SECTION      CHOLECYSTECTOMY      REPLACEMENT TOTAL KNEE            18 1405   Pain Assessment   Pain Assessment 0-10   Pain Score Worst Possible Pain   Hospital Pain Intervention(s) Repositioned   Response to Interventions increased pain w movement   Home Living   Type of [address] One level  (ramp accessible)   Prior Function   Level of [place] (assisted w ADL, ambulatory)   Lives With Family   Receives Help From Family;Home health   Falls in the last 6 months 1 to 4   Restrictions/Precautions   RLE Weight Bearing Per Order NWB   Braces or Orthoses LE Braces  (HKB locked in ext)   General   Family/Caregiver Present Yes   Cognition   Overall Cognitive Status Impaired   Arousal/Participation Alert   Attention Difficulty attending to directions   Orientation Level Oriented to person;Oriented to place   Memory Decreased recall of precautions   Following Commands Follows one step commands with increased time or repetition   RUE Assessment   RUE Assessment (funct reach and grasp)   LUE Assessment   LUE Assessment (funct reach and grasp)   RLE Assessment   RLE Assessment X   Strength RLE   RLE Overall Strength (hip fl 1/5)   LLE Assessment   LLE Assessment X   Strength LLE   LLE Overall Strength 3-/5   Coordination   Movements are Fluid and Coordinated 0   Coordination and Movement Description antalgic LE instability   Bed Mobility   Supine to Sit 1  Dependent   Additional items Assist x 2;HOB elevated; Increased time required;Verbal cues;LE management  (use of bed pad)   Transfers   Sit to Stand 1  Dependent   Additional items (unableto maintain NWB)   Ambulation/Elevation   Gait pattern Not appropriate  (unable to maintain NWB)   Balance   Static Sitting Fair   Dynamic Sitting Poor +   Endurance Deficit   Endurance Deficit Yes   Endurance Deficit Description pain fatigue anxiety   Activity Tolerance   Activity Tolerance (pain fatigue anxiet)   Nurse Made Aware yes   Assessment   Prognosis Fair   Problem List Decreased strength;Decreased range of motion;Decreased endurance; Impaired balance;Decreased mobility; Decreased coordination;Decreased cognition; Impaired judgement;Decreased safety awareness;Pain;Orthopedic restrictions   Assessment Pt is a 80 y o  female admitted to [facility] on 8/2/2018  is s/p R dist femur periprosthetic fx currently non-op in HKB locked in ext  Pt exhibits significant impairments with weakness, decreased ROM, impaired balance, decreased endurance, impaired coordination, gait deviations, pain, decreased activity tolerance, decreased functional mobility tolerance, decreased safety awareness, impaired judgement, fall risk, orthopedic restrictions and decreased skin integrity; these impact independence with mobility, ADLs, and IADLs; requires dep assist w sup-sit EOB- able to maintain EOB self-supported sit; sand trail x2 , pt very anxious and labilie unable to offer assist; dep assist x2 w stand pivot transf bed-chair; pt is not appropriate for stand transf and amb  this time; objective measures on the Barthel Index also reveal limitations;  therapy prognosis is impacted by relevant co morbidities as noted in evaluation; clinical presentation is currently unstable/unpredictable - pt is on cont pulse oximetry, presents w abnormal labs complex hx and phys impairments as noted- a regression from baseline; PTA, pt was ambulatory and assisted w care lives in single level setup  w [facility] and homehealth aide support; skilled PT is indicated to to optimize functional independence and discharge planning; pending functional progress, PT recommendation at discharge is IP rehab   Goals   Patient Goals go home   STG Expiration Date 08/16/18   Short Term Goal #1 1  Minimum assist with Bed Mobility Rolling Right and Left     2  Minimum assist with Bed Mobility Supine-Sit     3  Minimum assist with Transfer Bed-Chair     4  Increase Dynamic Sitting Balance at least 1 Grade for improved stability with functional reach activities     5  Increase Dynamic Standing Balance at least 1 Grade for progression to amb    6  Increase Lower Extremity Strength at least 1 Grade for improved ease mobility tasks     7  Minimum assist with wc mobility 150 ft feet using to facilitate home and community mobility   Plan   Treatment/Interventions Functional transfer training;LE strengthening/ROM; Elevations; Therapeutic exercise; Endurance training;Cognitive reorientation; Bed mobility; Patient/family training;Equipment eval/education;Gait training; Compensatory technique education;Continued evaluation;Spoke to nursing   PT Frequency (3-6x/wk)   Recommendation   Recommendation Post acute IP rehab   Equipment Recommended Wheelchair;Walker   Barthel Index   Feeding 10   Bathing 0   Grooming Score 0   Dressing Score 5   Bladder Score 5   Bowels Score 10   Toilet Use Score 5   Transfers (Bed/Chair) Score 5   Mobility (Level Surface) Score 0   Stairs Score 0   Barthel Index Score 40             PT Progress Note  Time :1350 -1405 [id number]  Total Time: 30      Additional session required to improve brace alignment and fit; pt very anxious, labile and PT required additional assist from OT initially to remove brace and adjust; multiple rest intervals and pt reassurance required; pt time provided to calm down and PT returned w Ortho tech to complete fit; at end of session brace appropriately aligned, pt encouraged to attempt stand to assess however pt unable; pt educated on wt shifts to minimize pressure sore formation    will benefit from cont PT seated transf training next visit, IP warren when med cleared  Milo Munguia, PT

## 2018-08-06 NOTE — ASSESSMENT & PLAN NOTE
· Most recent echo on our system from 2015 states moderate AS but family states she had recent echo which showed severe AS  · If surgery planned, likely needs cardiac clearance however discussed with ortho and cards will hold off on consult now as pt not for or   · Echo completed with ef of 60 % pt noted to have critical aortic stenosis   · I spoke with cards and due to pts functional status pt would not be candidate for cardiac sx dt pts immobilization at this time   Pt has to be rehabable  · -discussed with the family  · Should follow with cards outpt

## 2018-08-06 NOTE — PROGRESS NOTES
Progress Note - Palliative & Supportive Care       Progress Note - Nika Sullivan 7/20/1928, 80 y o  female MRN: [id number]    Unit/Bed#: [facility] 911-01 Encounter: [id number]    Primary Care Provider: ROMERO Sherman   Date and time admitted to hospital: 8/2/2018  7:32 PM    Patient Active Problem List   Diagnosis    Femoral fracture ([address] )    Severe aortic stenosis    Hyponatremia    Diarrhea    Hypertension    Hand pain, right    Pain    Goals of care, counseling/discussion       Goals of care, counseling/discussion   Assessment & Plan    - Level 3  - awaiting PT/OT recommendations         Pain   Assessment & Plan    - continue oxycodone 5 mg q4H ATC with strict hold parameters  - continue gabapentin 100 mg qHS  - continue Tylenol 975 mg PO TID  - continue Lidoderm patch and ice packs  - D/C IV Dilaudid and change to oxyIR for PRN use- PLEASE ADMINISTER 30 MINUTES PRIOR TO PT/OT  - bowel regimen to prevent OIC        * Femoral fracture (HCC)   Assessment & Plan    - plan for non-operative management at this time  - PT/OT evaluation pending            Interval history:       Patient doing well this AM  She is bright and cheerful and is happy that her family is visiting  States pain is okay as long as she is not moving  Plan to work with PT/OT today  Family states concerns about her swallowing  Overall opioid requirements have trended down over last 24 to 48 hours       MEDICATIONS / ALLERGIES:    all current active meds have been reviewed and current meds:   Current Facility-Administered Medications   Medication Dose Route Frequency    acetaminophen (TYLENOL) tablet 975 mg  975 mg Oral Q8H [address]    ALPRAZolam (XANAX) tablet 0 125 mg  0 125 mg Oral TID    amLODIPine (NORVASC) tablet 10 mg  10 mg Oral Daily    calcium carbonate (TUMS) chewable tablet 1,000 mg  1,000 mg Oral Daily PRN    docusate sodium (COLACE) capsule 100 mg  100 mg Oral BID    enoxaparin (LOVENOX) subcutaneous injection 40 mg  40 mg Subcutaneous Daily    gabapentin (NEURONTIN) capsule 100 mg  100 mg Oral HS    HYDROmorphone (DILAUDID) injection 0 2 mg  0 2 mg Intravenous Q2H PRN    ondansetron (ZOFRAN) injection 4 mg  4 mg Intravenous Q6H PRN    oxyCODONE (ROXICODONE) IR tablet 5 mg  5 mg Oral 4x Daily    senna (SENOKOT) tablet 8 6 mg  1 tablet Oral HS    sertraline (ZOLOFT) tablet 50 mg  50 mg Oral Daily    sodium chloride tablet 1 g  1 g Oral TID With Meals       No Active Allergies    OBJECTIVE:    Physical Exam  Physical Exam   Constitutional: She is oriented to person, place, and time  No distress  HENT:   Head: Normocephalic and atraumatic  Right Ear: External ear normal    Left Ear: External ear normal    Eyes: Right eye exhibits no discharge  Left eye exhibits no discharge  Cardiovascular: Normal rate and intact distal pulses  Murmur heard  Abdominal: Soft  She exhibits no distension  Musculoskeletal: She exhibits no edema  Right leg in brace   Neurological: She is alert and oriented to person, place, and time  Skin: There is pallor  Psychiatric: She has a normal mood and affect  Nursing note and vitals reviewed  Lab Results: I have personally reviewed pertinent labs  , CBC: No results found for: WBC, HGB, HCT, MCV, PLT, ADJUSTEDWBC, MCH, MCHC, RDW, MPV, NRBC, CMP: No results found for: NA, K, CL, CO2, ANIONGAP, BUN, CREATININE, GLUCOSE, CALCIUM, AST, ALT, ALKPHOS, PROT, ALBUMIN, BILITOT, EGFR  Imaging Studies: reviewed  EKG, Pathology, and Other Studies: reviewed    Counseling / Coordination of Care  Total floor / unit time spent today 25+ minutes  Greater than 50% of total time was spent with the patient and / or family counseling and / or coordination of care   A description of the counseling / coordination of care: symptom assessment, medication changes, discussion with SLIM, supportive listening

## 2018-08-06 NOTE — ASSESSMENT & PLAN NOTE
· Resolved   · Treated with antibiotics for UTI in July  · Pt has no complaints of diarrhea now  Feels maybe a little constipated will monitor on bowel regimen   · Pain meds on board atc will need to monitor mental status and constipation

## 2018-08-06 NOTE — PLAN OF CARE
Problem: PHYSICAL THERAPY ADULT  Goal: Performs mobility at highest level of function for planned discharge setting  See evaluation for individualized goals  Treatment/Interventions: Functional transfer training, LE strengthening/ROM, Elevations, Therapeutic exercise, Endurance training, Cognitive reorientation, Bed mobility, Patient/family training, Equipment eval/education, Gait training, Compensatory technique education, Continued evaluation, Spoke to nursing  Equipment Recommended: Wheelchair, Rowan Redder       See flowsheet documentation for full assessment, interventions and recommendations    Prognosis: Fair  Problem List: Decreased strength, Decreased range of motion, Decreased endurance, Impaired balance, Decreased mobility, Decreased coordination, Decreased cognition, Impaired judgement, Decreased safety awareness, Pain, Orthopedic restrictions  Assessment: Pt is a 80 y o  female admitted [facility] 8/2/2018  is s/p R dist femur periprosthetic fx currently non-op in HKB locked in ext  Pt exhibits significant impairments with weakness, decreased ROM, impaired balance, decreased endurance, impaired coordination, gait deviations, pain, decreased activity tolerance, decreased functional mobility tolerance, decreased safety awareness, impaired judgement, fall risk, orthopedic restrictions and decreased skin integrity; these impact independence with mobility, ADLs, and IADLs; requires dep assist w sup-sit EOB- able to maintain EOB self-supported sit; sand trail x2 , pt very anxious and labilie unable to offer assist; dep assist x2 w stand pivot transf bed-chair; pt is not appropriate for stand transf and amb  this time; objective measures on the Barthel Index also reveal limitations;  therapy prognosis is impacted by relevant co morbidities as noted in evaluation; clinical presentation is currently unstable/unpredictable - pt is on cont pulse oximetry, presents w abnormal labs complex hx and phys impairments as noted- a regression from baseline; PTA, pt was ambulatory and assisted w care lives in single level setup  w fam and homehealth aide support; skilled PT is indicated to to optimize functional independence and discharge planning; pending functional progress, PT recommendation at discharge is IP rehab        Recommendation: Post acute IP rehab          See flowsheet documentation for full assessment

## 2018-08-06 NOTE — PROGRESS NOTES
Progress Note - Gisele Cuadra 7/20/1928, 80 y o  female MRN: [id number]    Unit/Bed#: [facility] 911-01 Encounter: [id number]    Primary Care Provider: ROMERO Preciado   Date and time admitted to hospital: 8/2/2018  7:32 PM        * Femoral fracture [facility])   Assessment & Plan    · Distal femoral periprosthetic fracture, in brace per ortho with no surgical intervention planned  · Ortho evaluation appreciated discussed with ortho resident on call to call back as pts other son had arrived and had many question in regard to the method on how pts leg has been immobilized (pts son is a horse orthopedic surgeon) he had many questions in regard to methods     · Pain control, pt is in pain and on atc medications with xanax around the clock mental status is labile some confusion and definitely still with pain and increased anxiety at times           Diarrhea   Assessment & Plan    · Resolved   · Treated with antibiotics for UTI in July  · Pt has no complaints of diarrhea now  Feels maybe a little constipated will monitor on bowel regimen   · Pain meds on board atc will need to monitor mental status and constipation         Hyponatremia   Assessment & Plan    · No prior history of hyponatremia per family- note patient started on Sertraline 4 weeks ago  · sodium studies still remains at 128 at this time ,  urine studies indicate may be element of siadh along with pt severe aortic valve stenosis  · Possibly hypovolemia component given report of diarrhea- iv fluids decrease rate and start diet   · If sodium worsening or not improving, may need to d/c Sertraline,but   ·  Hesitant to d/c medication at this time as family reports it has helped her tremendously  · = pt started on salt tabs bid yesterday increased to tid today   · =chk labs in the am        Severe aortic stenosis   Assessment & Plan    · Most recent echo on our system from 2015 states moderate AS but family states she had recent echo which showed severe AS  · If surgery planned, likely needs cardiac clearance however discussed with ortho and cards will hold off on consult now as pt not for or   · Echo completed with ef of 60 % pt noted to have critical aortic stenosis   · I spoke with cards and due to pts functional status pt would not be candidate for cardiac sx dt pts immobilization at this time  Pt has to be rehabable  · -discussed with the family  · Should follow with cards outpt         Hand pain, right   Assessment & Plan    Obtain x-ray: No acute osseous abnormality  Hypertension   Assessment & Plan    · Would hold ARB resume if bp elevated or at discharge   · Continue Norvasc  · Monitor BP, stable             VTE Pharmacologic Prophylaxis:   Pharmacologic: Enoxaparin (Lovenox)  Mechanical VTE Prophylaxis in Place: Yes    Patient Centered Rounds: I have performed bedside rounds with nursing staff today  Discussions with Specialists or Other Care Team Provider: nursing ortho resident [facility] and Discussions with Family / Patient: patient     Time Spent for Care: 1hr and 45 minutes   More than 50% of total time spent on counseling and coordination of care as described above  Current Length of Stay: 3 day(s)    Current Patient Status: Inpatient   Certification Statement: The patient will continue to require additional inpatient hospital stay due to pending pt rehab plan and family wanting to address other options     Discharge Plan: to rehab when med cleared     Code Status: Level 3 - DNAR and DNI      Subjective:   Pt is with pain and tearful at times anxiety playing a role  When son leaves pt becomes emotional  No n v d  Long discussion in the room with both sons at bedside   Called ortho to talk with second son in regard to deta    Objective:     Vitals:   Temp (24hrs), Av 1 °F (36 7 °C), Min:98 °F (36 7 °C), Max:98 2 °F (36 8 °C)    HR:  [79-83] 83  Resp:  [18] 18  BP: (109-135)/(58-71) 118/63  SpO2:  [94 %-97 %] 97 %  Body mass index is 22 11 kg/m²  Input and Output Summary (last 24 hours): Intake/Output Summary (Last 24 hours) at 08/05/18 2302  Last data filed at 08/05/18 2100   Gross per 24 hour   Intake              280 ml   Output             1625 ml   Net            -1345 ml       Physical Exam:     Physical Exam   Constitutional: She is oriented to person, place, and time  No distress  HENT:   Head: Normocephalic and atraumatic  Eyes: Conjunctivae are normal  Right eye exhibits no discharge  Left eye exhibits no discharge  No scleral icterus  Neck: No JVD present  No tracheal deviation present  No thyromegaly present  Cardiovascular: Normal rate  Exam reveals no gallop and no friction rub  Murmur heard  Pulmonary/Chest: No stridor  No respiratory distress  She has no wheezes  She has no rales  She exhibits no tenderness  Abdominal: Soft  She exhibits no distension and no mass  There is no tenderness  There is no rebound and no guarding  Musculoskeletal: She exhibits deformity (right brace on lower extremity)  She exhibits no edema or tenderness  Lymphadenopathy:     She has no cervical adenopathy  Neurological: She is oriented to person, place, and time  Skin: No rash noted  She is not diaphoretic  No erythema  Psychiatric: She has a normal mood and affect           Additional Data:     Labs:      Results from last 7 days  Lab Units 08/05/18  0558   WBC Thousand/uL 14 60*   HEMOGLOBIN g/dL 9 3*   HEMATOCRIT % 27 2*   PLATELETS Thousands/uL 243   NEUTROS PCT % 74   LYMPHS PCT % 9*   MONOS PCT % 7   EOS PCT % 9*       Results from last 7 days  Lab Units 08/05/18  0558 08/05/18  0500   SODIUM mmol/L 125* 126*   POTASSIUM mmol/L 3 9 4 2   CHLORIDE mmol/L 92* 93*   CO2 mmol/L 28 27   BUN mg/dL 8 8   CREATININE mg/dL 0 33* 0 36*   CALCIUM mg/dL 8 1* 8 3   TOTAL PROTEIN g/dL  --  6 2*   BILIRUBIN TOTAL mg/dL  --  0 65   ALK PHOS U/L  --  117*   ALT U/L  --  52   AST U/L  --  30   GLUCOSE RANDOM mg/dL 91 75 Results from last 7 days  Lab Units 08/02/18  1111   INR  1 02                 * I Have Reviewed All Lab Data Listed Above  * Additional Pertinent Lab Tests Reviewed: All Labs Within Last 24 Hours Reviewed    Imaging:    Imaging Reports Reviewed Today Include: reviewed       Recent Cultures (last 7 days):           Last 24 Hours Medication List:     Current Facility-Administered Medications:  acetaminophen 975 mg Oral Q8H [address] Viktoriyapippa Mendoza PA-C   ALPRAZolam 0 125 mg Oral TID Keyla M Bendas, DO   amLODIPine 10 mg Oral Daily Meli Lab, DO   calcium carbonate 1,000 mg Oral Daily PRN Viktoriya Mendoza PA-C   docusate sodium 100 mg Oral BID ROMERO Jones   enoxaparin 40 mg Subcutaneous Daily Viktoriya Mendoza PA-C   gabapentin 100 mg Oral HS Keyla M Bendas, DO   HYDROmorphone 0 2 mg Intravenous Q2H PRN Keyla M Bendas, DO   ondansetron 4 mg Intravenous Q6H PRN Viktoriya Mendoza PA-C   oxyCODONE 5 mg Oral 4x Daily Keyla M Bendas, DO   senna 1 tablet Oral HS Keyla M Bendas, DO   sertraline 50 mg Oral Daily Viktoriya Mendoza PA-C   [START ON 8/6/2018] sodium chloride 1 g Oral TID With Meals ROMERO Jones        Today, Patient Was Seen By: ROMERO Jones    ** Please Note: Dictation voice to text software may have been used in the creation of this document   **

## 2018-08-06 NOTE — PLAN OF CARE
Problem: OCCUPATIONAL THERAPY ADULT  Goal: Performs self-care activities at highest level of function for planned discharge setting  See evaluation for individualized goals  Treatment Interventions: ADL retraining, Functional transfer training, Endurance training, Cognitive reorientation, Patient/family training, Equipment evaluation/education, Compensatory technique education, Energy conservation, Activityengagement          See flowsheet documentation for full assessment, interventions and recommendations  Limitation: Decreased ADL status, Decreased Safe judgement during ADL, Decreased cognition, Decreased endurance, Decreased self-care trans, Decreased high-level ADLs  Prognosis: Good  Assessment: Pt is a 80 y o  female seen for inital OT evaluation after sustaining a right distal femoral periprosthetic fracture as a result of a fall  Per ortho, pt would benefit from non operative management  [facility]  Pt's past medical history includes hypertension, anxiety, and TKA 16 years ago  Pt lives in an one level home with her son and daughter in law  Pt reports use of RW in the home, [facility] for short distances during community mobility, and tub transfer chair  Pt requires assistance with IADLs from son and daughter in law  Per daughter in law, a home health aide comes twice a week: one day for 7 hours and the other day for 3 hours  The home health aide provides assistance for dressing and bathing  Pt is retired and enjoys spending time with her dog and painting  Pt supine at beginning of session reporting 10/10 pain in right knee  Pt required emotional support and encouragement 2' fear/anxiety with movement of right knee  Pt performed bed mobility at dependent x2  Pt able to sit EOB for approximately 6-8 minutes  Initally, pt was distracted by pain and required MOD A for sitting balance  When pt distracted by therapist, pt able to sit EOB at close supervision   Pt performed sit<->stand transfer/stand pivot transfer at dependent x2 with HHA  Pt left in chair at end of session with all needs in reach + chair alarm on  Pt able to recall use of call bell  Pt required verbal cues for carryover of energy conservation techniques t/o session  Pt also easily distracted by increased pain and required verbal cues for redirection  At this time, pt is overall at [facility] AT ANTHEM A for UB ADLs and MAX A for LB ADLs  Pt's limitations include pain, fear/anxiety 2' movement, decreased activity tolerance, impaired balance, easily distracted, limited insight into deficits, impaired safety awareness, impaired judgement, NWB status on R LE, weakness, fall risk, and self limiting  Will continue to F/U to address OT established goals  From an OT perspective, pt will benefit from inpt rehab          OT Discharge Recommendation: Short Term Rehab  OT - OK to Discharge: Yes (when medically cleared )

## 2018-08-07 PROBLEM — D72.829 LEUKOCYTOSIS: Status: ACTIVE | Noted: 2018-08-07

## 2018-08-07 LAB
ANION GAP SERPL CALCULATED.3IONS-SCNC: 8 MMOL/L (ref 4–13)
BASOPHILS # BLD AUTO: 0.03 THOUSANDS/ΜL (ref 0–0.1)
BASOPHILS NFR BLD AUTO: 0 % (ref 0–1)
BUN SERPL-MCNC: 9 MG/DL (ref 5–25)
CALCIUM SERPL-MCNC: 8.6 MG/DL (ref 8.3–10.1)
CHLORIDE SERPL-SCNC: 90 MMOL/L (ref 100–108)
CO2 SERPL-SCNC: 29 MMOL/L (ref 21–32)
CREAT SERPL-MCNC: 0.39 MG/DL (ref 0.6–1.3)
EOSINOPHIL # BLD AUTO: 0.14 THOUSAND/ΜL (ref 0–0.61)
EOSINOPHIL NFR BLD AUTO: 1 % (ref 0–6)
ERYTHROCYTE [DISTWIDTH] IN BLOOD BY AUTOMATED COUNT: 14.2 % (ref 11.6–15.1)
GFR SERPL CREATININE-BSD FRML MDRD: 93 ML/MIN/1.73SQ M
GLUCOSE SERPL-MCNC: 106 MG/DL (ref 65–140)
HCT VFR BLD AUTO: 28.6 % (ref 34.8–46.1)
HGB BLD-MCNC: 9.9 G/DL (ref 11.5–15.4)
IMM GRANULOCYTES # BLD AUTO: 0.09 THOUSAND/UL (ref 0–0.2)
IMM GRANULOCYTES NFR BLD AUTO: 1 % (ref 0–2)
LYMPHOCYTES # BLD AUTO: 1.77 THOUSANDS/ΜL (ref 0.6–4.47)
LYMPHOCYTES NFR BLD AUTO: 10 % (ref 14–44)
MCH RBC QN AUTO: 30.5 PG (ref 26.8–34.3)
MCHC RBC AUTO-ENTMCNC: 34.6 G/DL (ref 31.4–37.4)
MCV RBC AUTO: 88 FL (ref 82–98)
MONOCYTES # BLD AUTO: 1.34 THOUSAND/ΜL (ref 0.17–1.22)
MONOCYTES NFR BLD AUTO: 8 % (ref 4–12)
NEUTROPHILS # BLD AUTO: 13.68 THOUSANDS/ΜL (ref 1.85–7.62)
NEUTS SEG NFR BLD AUTO: 80 % (ref 43–75)
NRBC BLD AUTO-RTO: 0 /100 WBCS
PLATELET # BLD AUTO: 346 THOUSANDS/UL (ref 149–390)
PMV BLD AUTO: 9.9 FL (ref 8.9–12.7)
POTASSIUM SERPL-SCNC: 3.6 MMOL/L (ref 3.5–5.3)
RBC # BLD AUTO: 3.25 MILLION/UL (ref 3.81–5.12)
SODIUM SERPL-SCNC: 127 MMOL/L (ref 136–145)
WBC # BLD AUTO: 17.05 THOUSAND/UL (ref 4.31–10.16)

## 2018-08-07 PROCEDURE — 99232 SBSQ HOSP IP/OBS MODERATE 35: CPT | Performed by: INTERNAL MEDICINE

## 2018-08-07 PROCEDURE — 80048 BASIC METABOLIC PNL TOTAL CA: CPT | Performed by: NURSE PRACTITIONER

## 2018-08-07 PROCEDURE — 85025 COMPLETE CBC W/AUTO DIFF WBC: CPT | Performed by: NURSE PRACTITIONER

## 2018-08-07 PROCEDURE — 99232 SBSQ HOSP IP/OBS MODERATE 35: CPT | Performed by: NURSE PRACTITIONER

## 2018-08-07 PROCEDURE — 92610 EVALUATE SWALLOWING FUNCTION: CPT

## 2018-08-07 RX ORDER — OXYCODONE HYDROCHLORIDE 5 MG/1
2.5 TABLET ORAL DAILY PRN
Status: DISCONTINUED | OUTPATIENT
Start: 2018-08-07 | End: 2018-08-08

## 2018-08-07 RX ORDER — GABAPENTIN 100 MG/1
200 CAPSULE ORAL
Status: DISCONTINUED | OUTPATIENT
Start: 2018-08-07 | End: 2018-08-09 | Stop reason: HOSPADM

## 2018-08-07 RX ADMIN — OXYCODONE HYDROCHLORIDE 5 MG: 5 TABLET ORAL at 21:49

## 2018-08-07 RX ADMIN — ALPRAZOLAM 0.12 MG: 0.25 TABLET ORAL at 15:52

## 2018-08-07 RX ADMIN — ENOXAPARIN SODIUM 40 MG: 40 INJECTION SUBCUTANEOUS at 08:33

## 2018-08-07 RX ADMIN — OXYCODONE HYDROCHLORIDE 5 MG: 5 TABLET ORAL at 08:32

## 2018-08-07 RX ADMIN — SENNOSIDES 8.6 MG: 8.6 TABLET, FILM COATED ORAL at 21:50

## 2018-08-07 RX ADMIN — SERTRALINE HYDROCHLORIDE 50 MG: 50 TABLET ORAL at 08:32

## 2018-08-07 RX ADMIN — ACETAMINOPHEN 975 MG: 325 TABLET ORAL at 05:54

## 2018-08-07 RX ADMIN — AMLODIPINE BESYLATE 10 MG: 10 TABLET ORAL at 08:32

## 2018-08-07 RX ADMIN — DOCUSATE SODIUM 100 MG: 100 CAPSULE, LIQUID FILLED ORAL at 17:05

## 2018-08-07 RX ADMIN — SODIUM CHLORIDE TAB 1 GM 1 G: 1 TAB at 08:32

## 2018-08-07 RX ADMIN — SODIUM CHLORIDE TAB 1 GM 1 G: 1 TAB at 15:52

## 2018-08-07 RX ADMIN — GABAPENTIN 200 MG: 100 CAPSULE ORAL at 21:48

## 2018-08-07 RX ADMIN — SODIUM CHLORIDE TAB 1 GM 1 G: 1 TAB at 12:19

## 2018-08-07 RX ADMIN — OXYCODONE HYDROCHLORIDE 5 MG: 5 TABLET ORAL at 12:19

## 2018-08-07 RX ADMIN — DOCUSATE SODIUM 100 MG: 100 CAPSULE, LIQUID FILLED ORAL at 08:32

## 2018-08-07 RX ADMIN — ALPRAZOLAM 0.12 MG: 0.25 TABLET ORAL at 08:33

## 2018-08-07 RX ADMIN — ACETAMINOPHEN 975 MG: 325 TABLET ORAL at 12:19

## 2018-08-07 NOTE — ASSESSMENT & PLAN NOTE
· Would hold ARB resume if bp elevated, bp starting to improve would consider resumption tomrrow   · Continue Norvasc     · Monitor BP, stable

## 2018-08-07 NOTE — ASSESSMENT & PLAN NOTE
· Distal femoral periprosthetic fracture, in brace per ortho with no surgical intervention planned  · Ortho evaluation appreciated discussed with ortho resident on call to call back as pts other son had arrived and had many question in regard to the method on how pts leg has been immobilized (pts son is a horse orthopedic surgeon) he had many questions in regard to methods  (this was addressed this morning and ortho called me to confirm this discussion with the son  · Pain control, pt is in pain and on atc medications with xanax around the clock mental status is labile some confusion and definitely still with pain and increased anxiety at times spoke with dr Nisha Nicole who has adjusted pain meds appropriately   · PT recommend rehab  · Rehab patient with brace to right lower extremity intact   · Nursing concerned with newer brace fit yesterday, on my exam brace is tight to right leg will need to be very careful with skin break down

## 2018-08-07 NOTE — SPEECH THERAPY NOTE
Speech Language/Pathology  Speech/Language Pathology Dysphagia Assessment    Patient Name: Juan M Fernandez  YHZBW'T Date: 2018     Problem List  Patient Active Problem List   Diagnosis    Femoral fracture (HCC)    Severe aortic stenosis    Hyponatremia    Diarrhea    Hypertension    Hand pain, right    Pain    Goals of care, counseling/discussion    Dysphagia     Past Medical History  Past Medical History:   Diagnosis Date    Hypertension     Psychiatric disorder     anxiety     Past Surgical History  Past Surgical History:   Procedure Laterality Date     SECTION      CHOLECYSTECTOMY      REPLACEMENT TOTAL KNEE       Summary:  Oropharyngeal swallow appeared WFLs  Pt demonstrated slow but adequate mastication and mildly delayed swallow initiation with adequate laryngeal excursion per palpation  No overt s/s aspiration observed  Per pt and family description of food sticking substernally, early satiety, and pill + food>liquid dysphagia, suspect esophageal deficits  Recommendations:  Diet: dysphagia level 3; choose moist foods  Liquid: thin  Meds: as tolerated  Supervision: complete  Positioning:Upright  Strategies: Pt to take PO/Meds only when fully alert and upright  Consider small frequent meals; alternate liquids and solids, use extra sauce/gravy/condiments to moisten foods, fully upright for all po, remain upright for 30-45 minutes after po  Oral care  Aspiration precautions  Reflux precautions    Eval only, No f/u tx indicated  D/w pts family  Reviewed diet recommendations and swallowing strategies/precautions  Consider consult w/:  GI - may be done as an OP  May also wish to consider OP barium esophagram to assess for esophageal dysphagia if symptoms persist despite use of strategies discussed        History of Present Illness:  Per H&P:  Juan M Fernandez is a 80 y o  female with a history of HTN, aortic stenosis, and depression/anxiety who presents with right knee pain s/p fall  Patient was actually at the [address] [place] [address] office when she fell, injuring her knee  Patient is a poor historian and is unsure how she fell but denies syncope  She normally uses a walker  She was found to have right knee periprosthetic knee fracture and was transferred to [facility] for ortho evaluation  Currently she reports knee pain and hand pain  Labs were also significant for hyponatremia      Patient reports diarrhea starting this morning but otherwise has been in her normal state of health  She was on antibiotics in July for UTI  Her family reports that her oral intake is good  No vomiting  She was recently started on sertraline four weeks ago and her family states this has really helped her mood  No prior history of hyponatremia      Has a history of aortic stenosis, which is severe per family  She denies SOB, chest pain, edema      She does not have a diagnosis of dementia but per family is forgetful, has poor short term memory, and suffers from anxiety  She lives at home with family  Per medical records, pt and family report h o  Difficulty swallowing  Pt has not been seen by ST for swallowing in the past and no record of VBS here  Reason for consult:  R/o aspiration  Determine safest and least restrictive diet    Precautions:  Aspiration    Current diet:  regular  Premorbid diet[de-identified]  regular  Previous VBS:  no  Voice/Speech:  Wfls; [place]  Social:  Lives with family  Follows commands:  yes                        Cognitive Status:  Awake and alert  [facility]  Full symmetry  WFLs    Items administered:  Hamburger, french fries, thin liquids x 12 oz  Oral stage: WFLs  Lip closure: good  Mastication: prolonged  Bolus formation: good  Bolus control: good  Transfer: slow; piecemeal transfer at times    Oral residue: no  Pocketing: no    Pharyngeal stage: WFLs  Swallow promptness: prompt  Laryngeal rise: adequate per palpation  Wet voice: no  Throat clear: no  Cough: no  Secondary swallows: no  Audible swallows: yes  No s/s aspiration    Esophageal stage:  No s/s reported  Reports early satiety, retrosternal sticking, food and pill dysphagia that becomes more pronounced as meal continues        Results d/w:  Pt, nursing, family, [address] [place] [address]

## 2018-08-07 NOTE — ASSESSMENT & PLAN NOTE
· Resolved   · Treated with antibiotics for UTI in July  · Pt has no complaints of diarrhea now    · Pain meds on board atc will need to monitor mental status and constipation

## 2018-08-07 NOTE — ASSESSMENT & PLAN NOTE
· No prior history of hyponatremia per family- note patient started on Sertraline 5 weeks ago  · sodium,  urine studies indicate may be element of siadh along with pt severe aortic valve stenosis  · - pts sodium today improving slowly on tid sodium tabs would continue at this time   · - discussed with my attending dr Chiara Negrete,  · If sodium worsening or not improving, may need to d/c Sertraline,but   ·  Hesitant to d/c medication at this time as family reports it has helped her tremendously  · pt started on salt tabs bid 8/4/18 increased to tid 8/5/18 with slight improvement   · chk labs in the am

## 2018-08-07 NOTE — PROGRESS NOTES
Progress Note - Nika Sullivan 7/20/1928, 80 y o  female MRN: [id number]    Unit/Bed#: [facility] 911-01 Encounter: [id number]    Primary Care Provider: ROMERO Sherman   Date and time admitted to hospital: 8/2/2018  7:32 PM        Dysphagia   Assessment & Plan    Pt has had this for some time  Continues to complain of difficulty   Will consult speech        * Femoral fracture (HCC)   Assessment & Plan    · Distal femoral periprosthetic fracture, in brace per ortho with no surgical intervention planned  · Ortho evaluation appreciated discussed with ortho resident on call to call back as pts other son had arrived and had many question in regard to the method on how pts leg has been immobilized (pts son is a horse orthopedic surgeon) he had many questions in regard to methods  (this was addressed this morning and ortho called me to confirm this discussion with the son  · Pain control, pt is in pain and on atc medications with xanax around the clock mental status is labile some confusion and definitely still with pain and increased anxiety at times spoke with dr Hi Fraga who has adjusted pain meds appropriately   · Pt still pending and ortho for rehab plan           Goals of care, counseling/discussion   Assessment & Plan    discussed per palliative        Pain   Assessment & Plan    Appreciate palliative meds         Diarrhea   Assessment & Plan    · Resolved   · Treated with antibiotics for UTI in July  · Pt has no complaints of diarrhea now  Feels maybe a little constipated will monitor on bowel regimen   · Pain meds on board atc will need to monitor mental status and constipation         Hyponatremia   Assessment & Plan    · No prior history of hyponatremia per family- note patient started on Sertraline 5 weeks ago  · sodium studies still remains at 125 at this time ,  urine studies indicate may be element of siadh along with pt severe aortic valve stenosis  · Possibly hypovolemia component given report of diarrhea- iv fluids decrease were admitted but discontinued a few days  and start diet   · If sodium worsening or not improving, may need to d/c Sertraline,but   ·  Hesitant to d/c medication at this time as family reports it has helped her tremendously  · = pt started on salt tabs bid yesterday increased to tid 8/5/18  · =chk labs in the am        Severe aortic stenosis   Assessment & Plan    · Most recent echo on our system from 2015 states moderate AS but family states she had recent echo which showed severe AS  · If surgery planned, likely needs cardiac clearance however discussed with ortho and cards will hold off on consult now as pt not for or   · Echo completed with ef of 60 % pt noted to have critical aortic stenosis   · I spoke with cards and due to pts functional status pt would not be candidate for cardiac sx dt pts immobilization at this time  Pt has to be rehabable  · -discussed with the family  · Should follow with cards outpt         Hand pain, right   Assessment & Plan    Obtain x-ray: No acute osseous abnormality  No further complaints         Hypertension   Assessment & Plan    · Would hold ARB resume if bp elevated or at discharge   · Continue Norvasc  · Monitor BP, stable             VTE Pharmacologic Prophylaxis:   Pharmacologic: Enoxaparin (Lovenox)  Mechanical VTE Prophylaxis in Place: Yes    Patient Centered Rounds: I have performed bedside rounds with nursing staff today  Discussions with Specialists or Other Care Team Provider: nursing /ortho/cm/     Education and Discussions with Family / Patient: patient and family    Time Spent for Care: 1 hour  More than 50% of total time spent on counseling and coordination of care as described above      Current Length of Stay: 4 day(s)    Current Patient Status: Inpatient   Certification Statement: The patient will continue to require additional inpatient hospital stay due to pt pending insurance auth    Discharge Plan: rehab   Code Status: Level 3 - DNAR and DNI      Subjective:   Pt talkative seems clearer this am   Still with co of pain in right knee area  No sob nervous     Objective:     Vitals:   Temp (24hrs), Av 4 °F (36 9 °C), Min:97 9 °F (36 6 °C), Max:98 9 °F (37 2 °C)    HR:  [80-81] 81  Resp:  [18] 18  BP: (107-142)/(54-62) 107/54  SpO2:  [94 %] 94 %  Body mass index is 22 11 kg/m²  Input and Output Summary (last 24 hours): Intake/Output Summary (Last 24 hours) at 18 2313  Last data filed at 18 1900   Gross per 24 hour   Intake              700 ml   Output             2075 ml   Net            -1375 ml       Physical Exam:     Physical Exam   Constitutional: She appears well-developed  No distress  HENT:   Small yellowing eccymotic area on anterior head   Eyes: Conjunctivae are normal  Right eye exhibits no discharge  Left eye exhibits no discharge  No scleral icterus  Neck: No JVD present  No tracheal deviation present  Cardiovascular: Normal rate and regular rhythm  Exam reveals no gallop and no friction rub  No murmur heard  Pulmonary/Chest: No stridor  No respiratory distress  She has no wheezes  She has no rales  She exhibits no tenderness  Poor effort    Abdominal: She exhibits no distension and no mass  There is no tenderness  There is no rebound and no guarding  Musculoskeletal: She exhibits deformity (brace to right leg pedal pulse warm)  Lymphadenopathy:     She has no cervical adenopathy  Neurological: She is alert  2 -3    Skin: Skin is warm and dry  No rash noted  She is not diaphoretic  No erythema  No pallor     Psychiatric:   Emotional          Additional Data:     Labs:      Results from last 7 days  Lab Units 18  0558   WBC Thousand/uL 14 60*   HEMOGLOBIN g/dL 9 3*   HEMATOCRIT % 27 2*   PLATELETS Thousands/uL 243   NEUTROS PCT % 74   LYMPHS PCT % 9*   MONOS PCT % 7   EOS PCT % 9*       Results from last 7 days  Lab Units 18  1604  18  0500   SODIUM mmol/L 125*  < > 126* POTASSIUM mmol/L 3 5  < > 4 2   CHLORIDE mmol/L 90*  < > 93*   CO2 mmol/L 28  < > 27   BUN mg/dL 9  < > 8   CREATININE mg/dL 0 34*  < > 0 36*   CALCIUM mg/dL 8 1*  < > 8 3   TOTAL PROTEIN g/dL  --   --  6 2*   BILIRUBIN TOTAL mg/dL  --   --  0 65   ALK PHOS U/L  --   --  117*   ALT U/L  --   --  52   AST U/L  --   --  30   GLUCOSE RANDOM mg/dL 115  < > 75   < > = values in this interval not displayed  Results from last 7 days  Lab Units 08/02/18  1111   INR  1 02                 * I Have Reviewed All Lab Data Listed Above  * Additional Pertinent Lab Tests Reviewed: All Labs Within Last 24 Hours Reviewed    Imaging:    Imaging Reports Reviewed Today Include: reviwed    Recent Cultures (last 7 days):           Last 24 Hours Medication List:     Current Facility-Administered Medications:  acetaminophen 975 mg Oral Q8H [address] Santi Martin PA-C   ALPRAZolam 0 125 mg Oral TID Keyla M Bendas, DO   amLODIPine 10 mg Oral Daily Paullette Kamla, DO   calcium carbonate 1,000 mg Oral Daily PRN Santi Martin PA-C   docusate sodium 100 mg Oral BID Loree Krabbe, CRNP   enoxaparin 40 mg Subcutaneous Daily Santi Martin PA-C   gabapentin 100 mg Oral HS Keyla M Bendas, DO   HYDROmorphone 0 2 mg Intravenous Q2H PRN Kyela M Bendas, DO   ondansetron 4 mg Intravenous Q6H PRN Santi Martin PA-C   oxyCODONE 5 mg Oral 4x Daily Keyla M Bendas, DO   senna 1 tablet Oral HS Keyla M Bendas, DO   sertraline 50 mg Oral Daily Santi Martin PA-C   sodium chloride 1 g Oral TID With Meals Loree Krabbe, CRNP        Today, Patient Was Seen By: Loree Krabbe, CRNP    ** Please Note: Dictation voice to text software may have been used in the creation of this document   **

## 2018-08-07 NOTE — ASSESSMENT & PLAN NOTE
· No prior history of hyponatremia per family- note patient started on Sertraline 5 weeks ago  · sodium studies still remains at 125 at this time ,  urine studies indicate may be element of siadh along with pt severe aortic valve stenosis  · Possibly hypovolemia component given report of diarrhea- iv fluids decrease were admitted but discontinued a few days  and start diet   · If sodium worsening or not improving, may need to d/c Sertraline,but   ·  Hesitant to d/c medication at this time as family reports it has helped her tremendously  · = pt started on salt tabs bid yesterday increased to tid 8/5/18  · =chk labs in the am

## 2018-08-07 NOTE — ASSESSMENT & PLAN NOTE
· Most recent echo on our system from 2015 states moderate AS but family states she had recent echo which showed severe AS  · No surgery planned,would need cardiac clearance however discussed with ortho and cards would hold off on consult as discussed with ortho and cards   · Echo completed with ef of 60 % pt noted to have critical aortic stenosis   · I spoke with cards and due to pts functional status pt would not be candidate for cardiac sx dt pts immobilization at this time   Pt has to be rehabable  · -discussed with the family  · Should follow with cards outpt

## 2018-08-07 NOTE — ASSESSMENT & PLAN NOTE
Pt has had this for some time  Continues to complain of difficulty swallowing   Patient was seen by speech who felt difficulty was mild and recommended dysphagia 3 thin liquids  Discussed with family

## 2018-08-07 NOTE — PROGRESS NOTES
Progress Note - Syed Mijares 7/20/1928, 80 y o  female MRN: [id number]    Unit/Bed#: [facility] 911-01 Encounter: [id number]    Primary Care Provider: ROMERO Paniagua   Date and time admitted to hospital: 8/2/2018  7:32 PM        * Femoral fracture [facility])   Assessment & Plan    · Distal femoral periprosthetic fracture, in brace per ortho with no surgical intervention planned  · Ortho evaluation appreciated discussed with ortho resident on call to call back as pts other son had arrived and had many question in regard to the method on how pts leg has been immobilized (pts son is a horse orthopedic surgeon) he had many questions in regard to methods  (this was addressed this morning and ortho called me to confirm this discussion with the son  · Pain control, pt is in pain and on atc medications with xanax around the clock mental status is labile some confusion and definitely still with pain and increased anxiety at times spoke with dr Jana Roy who has adjusted pain meds appropriately   · PT recommend rehab  · Rehab patient with brace to right lower extremity intact   · Nursing concerned with newer brace fit yesterday, on my exam brace is tight to right leg will need to be very careful with skin break down  Dysphagia   Assessment & Plan    Pt has had this for some time  Continues to complain of difficulty swallowing   Patient was seen by speech who felt difficulty was mild and recommended dysphagia 3 thin liquids  Discussed with family          Goals of care, counseling/discussion   Assessment & Plan    discussed per palliative family wanting treatment but pain control         Pain   Assessment & Plan    Appreciate palliative meds   See regimen pt is on atc meds with some anxiety medications  Will need to monitor mental status   Since Friday pt has been         Diarrhea   Assessment & Plan    · Resolved   · Treated with antibiotics for UTI in July  · Pt has no complaints of diarrhea now    · Pain meds on board atc will need to monitor mental status and constipation         Hyponatremia   Assessment & Plan    · No prior history of hyponatremia per family- note patient started on Sertraline 5 weeks ago  · sodium,  urine studies indicate may be element of siadh along with pt severe aortic valve stenosis  · - pts sodium today improving slowly on tid sodium tabs would continue at this time   · - discussed with my attending dr Sarah Givens,  · If sodium worsening or not improving, may need to d/c Sertraline,but   ·  Hesitant to d/c medication at this time as family reports it has helped her tremendously  · pt started on salt tabs bid 8/4/18 increased to tid 8/5/18 with slight improvement   · chk labs in the am        Severe aortic stenosis   Assessment & Plan    · Most recent echo on our system from 2015 states moderate AS but family states she had recent echo which showed severe AS  · No surgery planned,would need cardiac clearance however discussed with ortho and cards would hold off on consult as discussed with ortho and cards   · Echo completed with ef of 60 % pt noted to have critical aortic stenosis   · I spoke with cards and due to pts functional status pt would not be candidate for cardiac sx dt pts immobilization at this time  Pt has to be rehabable  · -discussed with the family  · Should follow with cards outpt         Hand pain, right   Assessment & Plan    Obtain x-ray: No acute osseous abnormality  No further complaints         Hypertension   Assessment & Plan    · Would hold ARB resume if bp elevated, bp starting to improve would consider resumption tomrrow   · Continue Norvasc     · Monitor BP, stable         Leukocytosis   Assessment & Plan    Slowly increasing   Pt is sp removal of varela yesterday, due to being very limited with mobility due to fx and extreme pain with any kind of mobility   Pt is afebrile, lungs are clear and pt has no urinary complaints  Will continue to observe one more day for decrease in wbc count   Pt seems a little lethargic today but anxiety meds and pain meds adjusted a little yesterday    Pt also noted to have recent iv infiltration warm compressor had been applied   Will trend if lower tomorrow would consider discharge to facility as bed available             VTE Pharmacologic Prophylaxis:   Pharmacologic: Enoxaparin (Lovenox)  Mechanical VTE Prophylaxis in Place: Yes    Patient Centered Rounds: I have performed bedside rounds with nursing staff today  Discussions with Specialists or Other Care Team Provider: nursing     Education and Discussions with Family / Patient: patient     Time Spent for Care: 45 minutes  More than 50% of total time spent on counseling and coordination of care as described above  Current Length of Stay: 5 day(s)    Current Patient Status: Inpatient   Certification Statement: The patient will continue to require additional inpatient hospital stay due to leukocytosis    Discharge Plan: if wbc declines tomorrow possible dc    Code Status: Level 3 - DNAR and DNI      Subjective:   Pt  Is talkative very forgetful  Seems a little more lethargic today  pain in right knee   Discussed with nursing to have ryann come julien sky  Pt is humorous and then emotional during conversation  Eating and drinking per family at the bedside updated daily  Objective:     Vitals:   Temp (24hrs), Av °F (36 7 °C), Min:97 5 °F (36 4 °C), Max:98 5 °F (36 9 °C)    HR:  [] 79  Resp:  [18] 18  BP: (102-131)/(52-65) 102/52  SpO2:  [94 %-98 %] 95 %  Body mass index is 22 11 kg/m²  Input and Output Summary (last 24 hours): Intake/Output Summary (Last 24 hours) at 18 1940  Last data filed at 18 1257   Gross per 24 hour   Intake              850 ml   Output              956 ml   Net             -106 ml       Physical Exam:     Physical Exam   Constitutional: No distress  HENT:   Head: Normocephalic and atraumatic  Mouth/Throat: No oropharyngeal exudate     Eyes: Conjunctivae are normal  Right eye exhibits no discharge  Left eye exhibits no discharge  No scleral icterus  Neck: No JVD present  No tracheal deviation present  No thyromegaly present  Cardiovascular: Normal rate  Exam reveals no gallop and no friction rub  Murmur heard  Pulmonary/Chest: No stridor  No respiratory distress  She has no wheezes  She has no rales  She exhibits no tenderness  Abdominal: Soft  She exhibits no distension and no mass  There is no tenderness  There is no rebound and no guarding  Musculoskeletal: She exhibits tenderness (right ) and deformity (rigth leg brace )  She exhibits no edema  Lymphadenopathy:     She has no cervical adenopathy  Neurological: She is alert  Alert x2 - 3    Skin: No rash noted  She is not diaphoretic  No erythema  No pallor  Psychiatric:   Flat lethargic          Additional Data:     Labs:      Results from last 7 days  Lab Units 08/07/18  0459   WBC Thousand/uL 17 05*   HEMOGLOBIN g/dL 9 9*   HEMATOCRIT % 28 6*   PLATELETS Thousands/uL 346   NEUTROS PCT % 80*   LYMPHS PCT % 10*   MONOS PCT % 8   EOS PCT % 1       Results from last 7 days  Lab Units 08/07/18  0459  08/05/18  0500   SODIUM mmol/L 127*  < > 126*   POTASSIUM mmol/L 3 6  < > 4 2   CHLORIDE mmol/L 90*  < > 93*   CO2 mmol/L 29  < > 27   BUN mg/dL 9  < > 8   CREATININE mg/dL 0 39*  < > 0 36*   CALCIUM mg/dL 8 6  < > 8 3   TOTAL PROTEIN g/dL  --   --  6 2*   BILIRUBIN TOTAL mg/dL  --   --  0 65   ALK PHOS U/L  --   --  117*   ALT U/L  --   --  52   AST U/L  --   --  30   GLUCOSE RANDOM mg/dL 106  < > 75   < > = values in this interval not displayed  Results from last 7 days  Lab Units 08/02/18  1111   INR  1 02                 * I Have Reviewed All Lab Data Listed Above  * Additional Pertinent Lab Tests Reviewed:  All Labs Within Last 24 Hours Reviewed    Imaging:    Imaging Reports Reviewed Today Include: reviewed       Recent Cultures (last 7 days):           Last 24 Hours Medication List:     Current Facility-Administered Medications:  acetaminophen 975 mg Oral Q8H [address] Bailey Counter, PA-MCKAYLA   ALPRAZolam 0 125 mg Oral TID Keyla M Bendas, DO   amLODIPine 10 mg Oral Daily Clarene Adan, DO   calcium carbonate 1,000 mg Oral Daily PRN Bailey Counter, PA-MCKAYLA   docusate sodium 100 mg Oral BID ROMERO Navas   enoxaparin 40 mg Subcutaneous Daily Bailey Counter, PA-MCKAYLA   gabapentin 200 mg Oral HS Eduard Pearson MD   HYDROmorphone 0 2 mg Intravenous Q2H PRN Verlan Tammy M Bendas, DO   ondansetron 4 mg Intravenous Q6H PRN Bailey Counter, KEVYN   oxyCODONE 2 5 mg Oral Daily PRN Eduard Pearson MD   oxyCODONE 5 mg Oral 4x Daily Keyla M Bendas, DO   senna 1 tablet Oral HS Keyla M Bendas, DO   sertraline 50 mg Oral Daily Bailey Counter, PA-MCKAYLA   sodium chloride 1 g Oral TID With Meals ROMERO Navas        Today, Patient Was Seen By: ROMERO Navas    ** Please Note: Dictation voice to text software may have been used in the creation of this document   **

## 2018-08-07 NOTE — SOCIAL WORK
Cm reviewed patient during care coordination rounds  Patient is not medically stable for d/c today  Cm informed that patient will continue to need observation  Anticipated d/c for tomorrow  Will inform following facility

## 2018-08-07 NOTE — PLAN OF CARE
CARDIOVASCULAR - ADULT     Maintains optimal cardiac output and hemodynamic stability Progressing     Absence of cardiac dysrhythmias or at baseline rhythm Progressing        DISCHARGE PLANNING     Discharge to home or other facility with appropriate resources Progressing        DISCHARGE PLANNING - CARE MANAGEMENT     Discharge to post-acute care or home with appropriate resources Progressing        INFECTION - ADULT     Absence or prevention of progression during hospitalization Progressing     Absence of fever/infection during neutropenic period Progressing        Knowledge Deficit     Patient/family/caregiver demonstrates understanding of disease process, treatment plan, medications, and discharge instructions Progressing        METABOLIC, FLUID AND ELECTROLYTES - ADULT     Electrolytes maintained within normal limits Progressing     Fluid balance maintained Progressing        MUSCULOSKELETAL - ADULT     Maintain or return mobility to safest level of function Progressing     Maintain proper alignment of affected body part Progressing        Nutrition/Hydration-ADULT     Nutrient/Hydration intake appropriate for improving, restoring or maintaining nutritional needs Progressing        PAIN - ADULT     Verbalizes/displays adequate comfort level or baseline comfort level Progressing        Potential for Falls     Patient will remain free of falls Progressing        Prexisting or High Potential for Compromised Skin Integrity     Skin integrity is maintained or improved Progressing        SAFETY ADULT     Maintain or return to baseline ADL function Progressing     Maintain or return mobility status to optimal level Progressing

## 2018-08-07 NOTE — DISCHARGE INSTR - DIET
Dental soft diet - choose moist, soft foods  Thin liquids  Fully upright when eating and for 30-45 minutes after meal   Chew thoroughly  Alternate liquids and solids  Consider several smaller meals per day rather than 3 large meals

## 2018-08-07 NOTE — PROGRESS NOTES
Progress note - Palliative and [address] 80 y o  female [id number]    Assessment:  Patient Active Problem List   Diagnosis    Femoral fracture (HCC)    Severe aortic stenosis    Hyponatremia    Diarrhea    Hypertension    Hand pain, right    Pain    Goals of care, counseling/discussion    Dysphagia       Plan:  1  Symptom management  · Analgesia: Will adjust regimen to optimize PT sessions  Continue the following regimen:  ? Tylenol 975 mg PO Q8H   ? Gabapentin 200 mg PO QHS (increased today from 100 mg QHS)  ? Oxycodone IR tablet 5 mg PO QID  ? Start additional oxycodone IR 2 5 mg PO QD PRN to be given prior to PT session    · Bowel regimen to prevent OIC:  ? Colace 100 mg PO BID  ? Senokot tablet 8 6 mg PO QHS    · Anti-emetic:   ? Zofran 4 mg IV Q6H PRN for nausea/vomiting    · Depression/anxiety/insomnia:  ? Xanax 0 125 mg PO TID for anxiety/insomnia  ? Zoloft 50 mg PO QD for depression    2  Goals of Care   · Continue symptom-directed GOC  ? No surgical intervention planned per Ortho  ? Pain slightly improved since changing to hinged knee brace per patient  ? Currently pursuing rehab facility as a possibility per Ortho     3  Code Status: Level 3 DNAR/DNI  · Advance Directive / Living Will / POLST:  Advance directive available for review in chart; living will lists [facility] as patient's healthcare surrogate with Saige Perla in listed as alternative healthcare surrogate  Interval history:  Patient today states that her pain is not very well controlled currently is a 5/10 in severity, and is so bad that she was unable to participate in physical therapy session yesterday  However, patient does admit pain slightly improved since changing to hinged knee brace per patient   Patient says at night she gets confused about "why she is here", and son and daughter-in-law, who are currently at bedside, report that patient was previously getting confused and fearful at night, but that patient sleep and anxiety have been greatly improved with the addition of Xanax TID  MEDICATIONS / ALLERGIES:    Current Facility-Administered Medications   Medication Dose Route Frequency    acetaminophen (TYLENOL) tablet 975 mg  975 mg Oral Q8H [facility]    ALPRAZolam (XANAX) tablet 0 125 mg  0 125 mg Oral TID    amLODIPine (NORVASC) tablet 10 mg  10 mg Oral Daily    calcium carbonate (TUMS) chewable tablet 1,000 mg  1,000 mg Oral Daily PRN    docusate sodium (COLACE) capsule 100 mg  100 mg Oral BID    enoxaparin (LOVENOX) subcutaneous injection 40 mg  40 mg Subcutaneous Daily    gabapentin (NEURONTIN) capsule 200 mg  200 mg Oral HS    HYDROmorphone (DILAUDID) injection 0 2 mg  0 2 mg Intravenous Q2H PRN    ondansetron (ZOFRAN) injection 4 mg  4 mg Intravenous Q6H PRN    oxyCODONE (ROXICODONE) IR tablet 2 5 mg  2 5 mg Oral Daily PRN    oxyCODONE (ROXICODONE) IR tablet 5 mg  5 mg Oral 4x Daily    senna (SENOKOT) tablet 8 6 mg  1 tablet Oral HS    sertraline (ZOLOFT) tablet 50 mg  50 mg Oral Daily    sodium chloride tablet 1 g  1 g Oral TID With Meals       No Active Allergies    OBJECTIVE:  /58   Pulse 85   Temp 97 5 °F (36 4 °C)   Resp 18   Ht 5' 1" (1 549 m)   Wt 53 1 kg (117 lb)   SpO2 94%   BMI 22 11 kg/m²     Physical Exam  Physical Exam   Constitutional: She appears well-developed  Appears uncomfortable   HENT:   Head: Normocephalic  Xanthochromic, resolving ecchymotic lesion over glabella   Eyes: EOM are normal  Right eye exhibits no discharge  Left eye exhibits no discharge  Neck: Normal range of motion  Cardiovascular: Normal rate and regular rhythm  Murmur heard  Loud systolic AS murmur heard loudest at R sternal border   Pulmonary/Chest: Effort normal    Abdominal: Soft  Bowel sounds are normal  There is no tenderness  Musculoskeletal: She exhibits tenderness     RLE with hinged brace in situ, mild edema of R knee but no erythema or calor, FROM of toes of RLE but exam limited due to pain   Neurological: She is alert  Oriented to self, place ("[facility]")   Skin: Skin is warm and dry  Capillary refill takes less than 2 seconds  Psychiatric: She has a normal mood and affect  Her behavior is normal    Vitals reviewed        Lab Results:   CBC:   Lab Results   Component Value Date    WBC 17 05 (H) 08/07/2018    HGB 9 9 (L) 08/07/2018    HCT 28 6 (L) 08/07/2018    MCV 88 08/07/2018     08/07/2018    MCH 30 5 08/07/2018    MCHC 34 6 08/07/2018    RDW 14 2 08/07/2018    MPV 9 9 08/07/2018    NRBC 0 08/07/2018   CMP:   Lab Results   Component Value Date     (L) 08/07/2018    K 3 6 08/07/2018    CL 90 (L) 08/07/2018    CO2 29 08/07/2018    ANIONGAP 8 08/07/2018    BUN 9 08/07/2018    CREATININE 0 39 (L) 08/07/2018    GLUCOSE 106 08/07/2018    CALCIUM 8 6 08/07/2018    EGFR 93 08/07/2018     JEFF Jones  PGY-2  [address] [facility]

## 2018-08-07 NOTE — ASSESSMENT & PLAN NOTE
Appreciate palliative meds   See regimen pt is on atc meds with some anxiety medications  Will need to monitor mental status   Since Friday pt has been

## 2018-08-07 NOTE — ASSESSMENT & PLAN NOTE
Slowly increasing   Pt is sp removal of varela yesterday, due to being very limited with mobility due to fx and extreme pain with any kind of mobility   Pt is afebrile, lungs are clear and pt has no urinary complaints  Will continue to observe one more day for decrease in wbc count   Pt seems a little lethargic today but anxiety meds and pain meds adjusted a little yesterday    Pt also noted to have recent iv infiltration warm compressor had been applied   Will trend if lower tomorrow would consider discharge to facility as bed available

## 2018-08-07 NOTE — ORTHOTIC NOTE
Orthotic Note            Date: 8/7/2018      Patient Name: Ayesha Paris        Time: 15:40pm    Reason for Consult:  Patient Active Problem List   Diagnosis    Femoral fracture (HCC)    Severe aortic stenosis    Hyponatremia    Diarrhea    Hypertension    Hand pain, right    Pain    Goals of care, counseling/discussion    Dysphagia    Leukocytosis   Breg G3 Cool Hinged Knee Brace [id number]  Per Orthopedics    I was in to follow up with patient regarding fit of Breg G3 Cool Pediatric HKB to RLE  I performed skin check and also spoke to Orthopedic resident Dr Mary Ann Nielsen in regards to patient skin integrity and amount of pressure being placed on medial/lateral aspects  Patient's skin is blanchable and HKB re-fit as it was migrating from knee  I will continue to follow up with patient to ensure optimal fit of HKB to RLE  RN aware  Recommendations:  Please call Mobility Coordinator at ext  [phone] in regards to bracing instruction and/or adjustment  Trevor Gordon Mobility Coordinator LCFo, LCOF, ASOP R  O T, O B T

## 2018-08-07 NOTE — ASSESSMENT & PLAN NOTE
· Distal femoral periprosthetic fracture, in brace per ortho with no surgical intervention planned  · Ortho evaluation appreciated discussed with ortho resident on call to call back as pts other son had arrived and had many question in regard to the method on how pts leg has been immobilized (pts son is a horse orthopedic surgeon) he had many questions in regard to methods  (this was addressed this morning and ortho called me to confirm this discussion with the son  · Pain control, pt is in pain and on atc medications with xanax around the clock mental status is labile some confusion and definitely still with pain and increased anxiety at times spoke with dr Dwight Jovel who has adjusted pain meds appropriately   · Pt still pending and ortho for rehab plan

## 2018-08-08 LAB
ANION GAP SERPL CALCULATED.3IONS-SCNC: 7 MMOL/L (ref 4–13)
ANION GAP SERPL CALCULATED.3IONS-SCNC: 7 MMOL/L (ref 4–13)
BASOPHILS # BLD AUTO: 0.06 THOUSANDS/ΜL (ref 0–0.1)
BASOPHILS NFR BLD AUTO: 1 % (ref 0–1)
BUN SERPL-MCNC: 13 MG/DL (ref 5–25)
BUN SERPL-MCNC: 17 MG/DL (ref 5–25)
CALCIUM SERPL-MCNC: 8.3 MG/DL (ref 8.3–10.1)
CALCIUM SERPL-MCNC: 8.4 MG/DL (ref 8.3–10.1)
CHLORIDE SERPL-SCNC: 91 MMOL/L (ref 100–108)
CHLORIDE SERPL-SCNC: 91 MMOL/L (ref 100–108)
CO2 SERPL-SCNC: 28 MMOL/L (ref 21–32)
CO2 SERPL-SCNC: 29 MMOL/L (ref 21–32)
CREAT SERPL-MCNC: 0.41 MG/DL (ref 0.6–1.3)
CREAT SERPL-MCNC: 0.48 MG/DL (ref 0.6–1.3)
EOSINOPHIL # BLD AUTO: 0.78 THOUSAND/ΜL (ref 0–0.61)
EOSINOPHIL NFR BLD AUTO: 6 % (ref 0–6)
ERYTHROCYTE [DISTWIDTH] IN BLOOD BY AUTOMATED COUNT: 14.5 % (ref 11.6–15.1)
GFR SERPL CREATININE-BSD FRML MDRD: 87 ML/MIN/1.73SQ M
GFR SERPL CREATININE-BSD FRML MDRD: 91 ML/MIN/1.73SQ M
GLUCOSE SERPL-MCNC: 143 MG/DL (ref 65–140)
GLUCOSE SERPL-MCNC: 89 MG/DL (ref 65–140)
HCT VFR BLD AUTO: 27.9 % (ref 34.8–46.1)
HGB BLD-MCNC: 9.5 G/DL (ref 11.5–15.4)
IMM GRANULOCYTES # BLD AUTO: 0.08 THOUSAND/UL (ref 0–0.2)
IMM GRANULOCYTES NFR BLD AUTO: 1 % (ref 0–2)
LYMPHOCYTES # BLD AUTO: 2.43 THOUSANDS/ΜL (ref 0.6–4.47)
LYMPHOCYTES NFR BLD AUTO: 19 % (ref 14–44)
MCH RBC QN AUTO: 30.2 PG (ref 26.8–34.3)
MCHC RBC AUTO-ENTMCNC: 34.1 G/DL (ref 31.4–37.4)
MCV RBC AUTO: 89 FL (ref 82–98)
MONOCYTES # BLD AUTO: 1.04 THOUSAND/ΜL (ref 0.17–1.22)
MONOCYTES NFR BLD AUTO: 8 % (ref 4–12)
NEUTROPHILS # BLD AUTO: 8.59 THOUSANDS/ΜL (ref 1.85–7.62)
NEUTS SEG NFR BLD AUTO: 65 % (ref 43–75)
NRBC BLD AUTO-RTO: 0 /100 WBCS
PLATELET # BLD AUTO: 356 THOUSANDS/UL (ref 149–390)
PMV BLD AUTO: 9.8 FL (ref 8.9–12.7)
POTASSIUM SERPL-SCNC: 4.1 MMOL/L (ref 3.5–5.3)
POTASSIUM SERPL-SCNC: 4.4 MMOL/L (ref 3.5–5.3)
RBC # BLD AUTO: 3.15 MILLION/UL (ref 3.81–5.12)
SODIUM SERPL-SCNC: 126 MMOL/L (ref 136–145)
SODIUM SERPL-SCNC: 127 MMOL/L (ref 136–145)
WBC # BLD AUTO: 12.98 THOUSAND/UL (ref 4.31–10.16)

## 2018-08-08 PROCEDURE — 80048 BASIC METABOLIC PNL TOTAL CA: CPT | Performed by: NURSE PRACTITIONER

## 2018-08-08 PROCEDURE — 99232 SBSQ HOSP IP/OBS MODERATE 35: CPT | Performed by: INTERNAL MEDICINE

## 2018-08-08 PROCEDURE — 97110 THERAPEUTIC EXERCISES: CPT

## 2018-08-08 PROCEDURE — 85025 COMPLETE CBC W/AUTO DIFF WBC: CPT | Performed by: NURSE PRACTITIONER

## 2018-08-08 PROCEDURE — 97530 THERAPEUTIC ACTIVITIES: CPT

## 2018-08-08 PROCEDURE — 80048 BASIC METABOLIC PNL TOTAL CA: CPT | Performed by: INTERNAL MEDICINE

## 2018-08-08 RX ORDER — SODIUM CHLORIDE 1000 MG
2 TABLET, SOLUBLE MISCELLANEOUS
Status: DISCONTINUED | OUTPATIENT
Start: 2018-08-08 | End: 2018-08-09 | Stop reason: HOSPADM

## 2018-08-08 RX ORDER — POLYETHYLENE GLYCOL 3350 17 G/17G
17 POWDER, FOR SOLUTION ORAL DAILY
Status: DISCONTINUED | OUTPATIENT
Start: 2018-08-08 | End: 2018-08-09 | Stop reason: HOSPADM

## 2018-08-08 RX ORDER — SENNOSIDES 8.6 MG
2 TABLET ORAL
Status: DISCONTINUED | OUTPATIENT
Start: 2018-08-08 | End: 2018-08-09 | Stop reason: HOSPADM

## 2018-08-08 RX ORDER — OXYCODONE HYDROCHLORIDE 5 MG/1
2.5 TABLET ORAL EVERY 4 HOURS PRN
Status: DISCONTINUED | OUTPATIENT
Start: 2018-08-08 | End: 2018-08-09 | Stop reason: HOSPADM

## 2018-08-08 RX ADMIN — SENNOSIDES 17.2 MG: 8.6 TABLET, FILM COATED ORAL at 21:19

## 2018-08-08 RX ADMIN — SODIUM CHLORIDE TAB 1 GM 2 G: 1 TAB at 17:37

## 2018-08-08 RX ADMIN — SERTRALINE HYDROCHLORIDE 50 MG: 50 TABLET ORAL at 08:24

## 2018-08-08 RX ADMIN — ACETAMINOPHEN 975 MG: 325 TABLET ORAL at 21:19

## 2018-08-08 RX ADMIN — OXYCODONE HYDROCHLORIDE 2.5 MG: 5 TABLET ORAL at 15:30

## 2018-08-08 RX ADMIN — SODIUM CHLORIDE TAB 1 GM 2 G: 1 TAB at 13:07

## 2018-08-08 RX ADMIN — SODIUM CHLORIDE TAB 1 GM 1 G: 1 TAB at 08:24

## 2018-08-08 RX ADMIN — OXYCODONE HYDROCHLORIDE 5 MG: 5 TABLET ORAL at 08:24

## 2018-08-08 RX ADMIN — ALPRAZOLAM 0.12 MG: 0.25 TABLET ORAL at 08:24

## 2018-08-08 RX ADMIN — ACETAMINOPHEN 975 MG: 325 TABLET ORAL at 13:07

## 2018-08-08 RX ADMIN — DOCUSATE SODIUM 100 MG: 100 CAPSULE, LIQUID FILLED ORAL at 08:24

## 2018-08-08 RX ADMIN — POLYETHYLENE GLYCOL 3350 17 G: 17 POWDER, FOR SOLUTION ORAL at 15:30

## 2018-08-08 RX ADMIN — GABAPENTIN 200 MG: 100 CAPSULE ORAL at 21:19

## 2018-08-08 RX ADMIN — ACETAMINOPHEN 975 MG: 325 TABLET ORAL at 05:20

## 2018-08-08 RX ADMIN — ALPRAZOLAM 0.12 MG: 0.25 TABLET ORAL at 17:37

## 2018-08-08 RX ADMIN — ALPRAZOLAM 0.12 MG: 0.25 TABLET ORAL at 21:19

## 2018-08-08 RX ADMIN — AMLODIPINE BESYLATE 10 MG: 10 TABLET ORAL at 08:25

## 2018-08-08 RX ADMIN — ENOXAPARIN SODIUM 40 MG: 40 INJECTION SUBCUTANEOUS at 08:24

## 2018-08-08 RX ADMIN — SODIUM CHLORIDE TAB 1 GM 1 G: 1 TAB at 08:38

## 2018-08-08 NOTE — ASSESSMENT & PLAN NOTE
Probably due to stress  Trending down  Other evidence of infection  Patient denies any urinary symptoms  Continue to monitor

## 2018-08-08 NOTE — ASSESSMENT & PLAN NOTE
· Distal femoral fracture, in brace per ortho with no surgical intervention planned  · Pain currently well controlled      · PT recommend rehab

## 2018-08-08 NOTE — PHYSICAL THERAPY NOTE
Physical Therapy Progress Note     08/08/18 1320   Pain Assessment   Pain Assessment 0-10   Pain Score Worst Possible Pain   Pain Frequency Intermittent  (With activity  Pt  notes no pain at rest )   Hospital Pain Intervention(s) Repositioned; Emotional support   Response to Interventions Tolerated  Restrictions/Precautions   RLE Weight Bearing Per Order NWB   Braces or Orthoses LE Braces  (Hinged knee brace locked in extension )   Other Precautions Cognitive; Bed Alarm; Fall Risk;Pain   Subjective   Subjective The pt  states that she has no pain at rest, but she notes severe pain with movement  The pt  would scream in pain even prior to being touched by therapy, but when distracted she was able to perform range of motion on her RLE  Bed Mobility   Rolling R 1  Dependent   Rolling L 1  Dependent   Activity Tolerance   Activity Tolerance Patient limited by pain   Exercises   THR Supine;Right;AAROM;PROM;10 reps  (x2 sets )   Assessment   Prognosis Fair   Problem List Decreased strength;Decreased range of motion;Decreased endurance; Impaired balance;Decreased mobility; Decreased coordination;Decreased cognition; Impaired judgement;Decreased safety awareness;Pain;Orthopedic restrictions   Assessment The pt  was anxious about therapy, but with distraction she was able to perform therapeutic exercise with her RLE  She required frequent redirection to task as she would become apprehensive about increased pain  She was educated about the benefit of sitting up at the edge of the bed, but the pt  adamantly declined several times  Barriers to Discharge Inaccessible home environment;Decreased caregiver support   Goals   Patient Goals To have less pain  STG Expiration Date 08/16/18   Short Term Goal #1 1  Minimum assist with Bed Mobility Rolling Right and Left 2  Minimum assist with Bed Mobility Supine-Sit 3  Minimum assist with Transfer Bed-Chair 4   Increase Dynamic Sitting Balance at least 1 Grade for improved stability with functional reach activities 5  Increase Dynamic Standing Balance at least 1 Grade for progression to amb 6  Increase Lower Extremity Strength at least 1 Grade for improved ease mobility tasks 7  Minimum assist with wc mobility 150 ft feet using to facilitate home and community mobility by Leelee Burns PT at 08/06/18 1405   Treatment Day 1   Plan   Treatment/Interventions Functional transfer training;LE strengthening/ROM; Therapeutic exercise; Endurance training;Patient/family training;Bed mobility   Progress Slow progress, decreased activity tolerance   PT Frequency (3-6x a week )   Recommendation   Recommendation Post acute IP rehab     Rosa Maria Mars, PTA

## 2018-08-08 NOTE — ASSESSMENT & PLAN NOTE
· No prior history of hyponatremia per family- note patient started on Sertraline 5 weeks ago  · sodium,  urine studies indicate may be element of siadh  · Patient's sodium this morning same as yesterday at 127 - recheck in the afternoon came down to 126  · Increase salt tablets to 2 g t i d  and start fluid restriction at 1500 mL      · If sodium worsening or not improving, may need to d/c Sertraline,but   ·  Hesitant to d/c medication at this time as family reports it has helped her tremendously

## 2018-08-08 NOTE — PLAN OF CARE
Problem: PHYSICAL THERAPY ADULT  Goal: Performs mobility at highest level of function for planned discharge setting  See evaluation for individualized goals  Treatment/Interventions: Functional transfer training, LE strengthening/ROM, Elevations, Therapeutic exercise, Endurance training, Cognitive reorientation, Bed mobility, Patient/family training, Equipment eval/education, Gait training, Compensatory technique education, Continued evaluation, Spoke to nursing  Equipment Recommended: Wheelchair, Lashonda Noble       See flowsheet documentation for full assessment, interventions and recommendations  Outcome: Progressing  Prognosis: Fair  Problem List: Decreased strength, Decreased range of motion, Decreased endurance, Impaired balance, Decreased mobility, Decreased coordination, Decreased cognition, Impaired judgement, Decreased safety awareness, Pain, Orthopedic restrictions  Assessment: The pt  was anxious about therapy, but with distraction she was able to perform therapeutic exercise with her RLE  She required frequent redirection to task as she would become apprehensive about increased pain  She was educated about the benefit of sitting up at the edge of the bed, but the pt  adamantly declined several times  Barriers to Discharge: Inaccessible home environment, Decreased caregiver support     Recommendation: Post acute IP rehab          See flowsheet documentation for full assessment

## 2018-08-08 NOTE — PROGRESS NOTES
Progress Note - Rodri Allne 7/20/1928, 80 y o  female MRN: [id number]    Unit/Bed#: [facility] 901-01 Encounter: [id number]    Primary Care Provider: ROMERO Stanford   Date and time admitted to hospital: 8/2/2018  7:32 PM        Hyponatremia   Assessment & Plan    · No prior history of hyponatremia per family- note patient started on Sertraline 5 weeks ago  · sodium,  urine studies indicate may be element of siadh  · Patient's sodium this morning same as yesterday at 127 - recheck in the afternoon came down to 126  · Increase salt tablets to 2 g t i d  and start fluid restriction at 1500 mL  · If sodium worsening or not improving, may need to d/c Sertraline,but   ·  Hesitant to d/c medication at this time as family reports it has helped her tremendously        * Femoral fracture [facility])   Assessment & Plan    · Distal femoral fracture, in brace per ortho with no surgical intervention planned  · Pain currently well controlled  · PT recommend rehab        Leukocytosis   Assessment & Plan    Probably due to stress  Trending down  Other evidence of infection  Patient denies any urinary symptoms  Continue to monitor  Dysphagia   Assessment & Plan    Continue modified diet as per speech and swallow recommendations  Severe aortic stenosis   Assessment & Plan    · Most recent echo on our system from 2015 states moderate AS but family states she had recent echo which showed severe AS  · No surgery planned,would need cardiac clearance however discussed with ortho and cards would hold off on consult as discussed with ortho and cards   · Echo completed with ef of 60 % pt noted to have critical aortic stenosis   · As per Cardiology, due to pts functional status pt would not be candidate for cardiac sx dt pts immobilization at this time    · Should follow with cards outpt             VTE Pharmacologic Prophylaxis:   Pharmacologic: Enoxaparin (Lovenox)  Mechanical VTE Prophylaxis in Place: Yes  Patient Centered Rounds: I have performed bedside rounds with nursing staff today  Discussions with Specialists or Other Care Team Provider:   Education and Discussions with Family / Patient: pt and son Peggy Riddle on the phone  Time Spent for Care: 30 minutes  More than 50% of total time spent on counseling and coordination of care as described above  Current Length of Stay: 6 day(s)  Current Patient Status: Inpatient   Certification Statement: The patient will continue to require additional inpatient hospital stay due to above  Discharge Plan: pending improvement in sodium  Code Status: Level 3 - DNAR and DNI      Subjective:   Pt seen and examined by me this morning  Pt complained of  pain in her right hip when she moves it  Otherwise she is okay  She said she wants to go home  Objective:     Vitals:   Temp (24hrs), Av 5 °F (36 9 °C), Min:97 9 °F (36 6 °C), Max:98 9 °F (37 2 °C)    HR:  [79-80] 80  Resp:  [18] 18  BP: (102-121)/(52-60) 121/60  SpO2:  [94 %-98 %] 94 %  Body mass index is 22 11 kg/m²  Input and Output Summary (last 24 hours): Intake/Output Summary (Last 24 hours) at 18 1456  Last data filed at 18 1419   Gross per 24 hour   Intake              200 ml   Output             1070 ml   Net             -870 ml       Physical Exam:     Physical Exam    Constitutional: Pt appears well-developed and well-nourished  Not in any acute distress  Cardiovascular: Normal rate, regular rhythm, normal heart sounds  Exam reveals no gallop and no friction rub  No murmur heard  Pulmonary/Chest: Effort normal and breath sounds normal  No respiratory distress  Pt has no wheezes or rales  Abdominal: Soft  Non-distended, Non-tender  Bowel sounds are normal    Neurological: alert and oriented to person, place, and time  Normal strength and sensations  Psychiatric: normal mood and affect        Additional Data:     Labs:      Results from last 7 days  Lab Units 18  0438   WBC Thousand/uL 12 98*   HEMOGLOBIN g/dL 9 5*   HEMATOCRIT % 27 9*   PLATELETS Thousands/uL 356   NEUTROS PCT % 65   LYMPHS PCT % 19   MONOS PCT % 8   EOS PCT % 6       Results from last 7 days  Lab Units 08/08/18  1323  08/05/18  0500   SODIUM mmol/L 126*  < > 126*   POTASSIUM mmol/L 4 4  < > 4 2   CHLORIDE mmol/L 91*  < > 93*   CO2 mmol/L 28  < > 27   BUN mg/dL 17  < > 8   CREATININE mg/dL 0 48*  < > 0 36*   CALCIUM mg/dL 8 4  < > 8 3   TOTAL PROTEIN g/dL  --   --  6 2*   BILIRUBIN TOTAL mg/dL  --   --  0 65   ALK PHOS U/L  --   --  117*   ALT U/L  --   --  52   AST U/L  --   --  30   GLUCOSE RANDOM mg/dL 143*  < > 75   < > = values in this interval not displayed  Results from last 7 days  Lab Units 08/02/18  1111   INR  1 02                 * I Have Reviewed All Lab Data Listed Above  * Additional Pertinent Lab Tests Reviewed:  Shane Gonzales Admission Reviewed    Imaging:    Imaging Reports Reviewed Today Include:   Imaging Personally Reviewed by Myself Includes:      Recent Cultures (last 7 days):           Last 24 Hours Medication List:     Current Facility-Administered Medications:  acetaminophen 975 mg Oral Q8H [address] Geraldine Pacheco PA-C   ALPRAZolam 0 125 mg Oral TID Keyla M Leighton, DO   amLODIPine 10 mg Oral Daily Amy Fontenot, DO   calcium carbonate 1,000 mg Oral Daily PRN Geraldine Pacheco PA-C   docusate sodium 100 mg Oral BID ROMERO Hills   enoxaparin 40 mg Subcutaneous Daily Geraldine Pacheco PA-C   gabapentin 200 mg Oral HS Vicki Garcia MD   HYDROmorphone 0 2 mg Intravenous Q2H PRN Liset Poles JEFF Manzanares, DO   ondansetron 4 mg Intravenous Q6H PRN Geraldine Pacheco PA-C   oxyCODONE 2 5 mg Oral Daily PRN Vicki Garcia MD   oxyCODONE 5 mg Oral 4x Daily Keyla M Leighton, DO   senna 1 tablet Oral HS Keyla JEFF Manzanares, DO   sertraline 50 mg Oral Daily Geraldine Pacheco PA-C   sodium chloride 2 g Oral TID With Connie Dahl MD        Today, Patient Was Seen By: Allan Valdez MD    ** Please Note: Dictation voice to text software may have been used in the creation of this document   **

## 2018-08-08 NOTE — SOCIAL WORK
Cm reviewed patient with MD  Patient is not stable for d/c today  Will continue to follow for medical stability

## 2018-08-08 NOTE — ASSESSMENT & PLAN NOTE
· Most recent echo on our system from 2015 states moderate AS but family states she had recent echo which showed severe AS  · No surgery planned,would need cardiac clearance however discussed with ortho and cards would hold off on consult as discussed with ortho and cards   · Echo completed with ef of 60 % pt noted to have critical aortic stenosis   · As per Cardiology, due to pts functional status pt would not be candidate for cardiac sx dt pts immobilization at this time    · Should follow with cards outpt

## 2018-08-09 VITALS
DIASTOLIC BLOOD PRESSURE: 75 MMHG | TEMPERATURE: 97.6 F | HEIGHT: 61 IN | HEART RATE: 92 BPM | BODY MASS INDEX: 22.09 KG/M2 | SYSTOLIC BLOOD PRESSURE: 132 MMHG | OXYGEN SATURATION: 99 % | WEIGHT: 117 LBS | RESPIRATION RATE: 18 BRPM

## 2018-08-09 LAB
ANION GAP SERPL CALCULATED.3IONS-SCNC: 9 MMOL/L (ref 4–13)
ANION GAP SERPL CALCULATED.3IONS-SCNC: 9 MMOL/L (ref 4–13)
BUN SERPL-MCNC: 11 MG/DL (ref 5–25)
BUN SERPL-MCNC: 11 MG/DL (ref 5–25)
CALCIUM SERPL-MCNC: 8.5 MG/DL (ref 8.3–10.1)
CALCIUM SERPL-MCNC: 8.5 MG/DL (ref 8.3–10.1)
CHLORIDE SERPL-SCNC: 92 MMOL/L (ref 100–108)
CHLORIDE SERPL-SCNC: 94 MMOL/L (ref 100–108)
CO2 SERPL-SCNC: 26 MMOL/L (ref 21–32)
CO2 SERPL-SCNC: 27 MMOL/L (ref 21–32)
CREAT SERPL-MCNC: 0.3 MG/DL (ref 0.6–1.3)
CREAT SERPL-MCNC: 0.33 MG/DL (ref 0.6–1.3)
ERYTHROCYTE [DISTWIDTH] IN BLOOD BY AUTOMATED COUNT: 14.4 % (ref 11.6–15.1)
GFR SERPL CREATININE-BSD FRML MDRD: 101 ML/MIN/1.73SQ M
GFR SERPL CREATININE-BSD FRML MDRD: 98 ML/MIN/1.73SQ M
GLUCOSE SERPL-MCNC: 91 MG/DL (ref 65–140)
GLUCOSE SERPL-MCNC: 94 MG/DL (ref 65–140)
HCT VFR BLD AUTO: 27.3 % (ref 34.8–46.1)
HGB BLD-MCNC: 9.3 G/DL (ref 11.5–15.4)
MCH RBC QN AUTO: 29.9 PG (ref 26.8–34.3)
MCHC RBC AUTO-ENTMCNC: 34.1 G/DL (ref 31.4–37.4)
MCV RBC AUTO: 88 FL (ref 82–98)
PLATELET # BLD AUTO: 408 THOUSANDS/UL (ref 149–390)
PMV BLD AUTO: 9.5 FL (ref 8.9–12.7)
POTASSIUM SERPL-SCNC: 3.7 MMOL/L (ref 3.5–5.3)
POTASSIUM SERPL-SCNC: 3.7 MMOL/L (ref 3.5–5.3)
RBC # BLD AUTO: 3.11 MILLION/UL (ref 3.81–5.12)
SODIUM SERPL-SCNC: 128 MMOL/L (ref 136–145)
SODIUM SERPL-SCNC: 129 MMOL/L (ref 136–145)
WBC # BLD AUTO: 12.23 THOUSAND/UL (ref 4.31–10.16)

## 2018-08-09 PROCEDURE — 85027 COMPLETE CBC AUTOMATED: CPT | Performed by: INTERNAL MEDICINE

## 2018-08-09 PROCEDURE — 99239 HOSP IP/OBS DSCHRG MGMT >30: CPT | Performed by: INTERNAL MEDICINE

## 2018-08-09 PROCEDURE — 80048 BASIC METABOLIC PNL TOTAL CA: CPT | Performed by: INTERNAL MEDICINE

## 2018-08-09 RX ORDER — GABAPENTIN 100 MG/1
200 CAPSULE ORAL
Qty: 30 CAPSULE | Refills: 0 | Status: SHIPPED | OUTPATIENT
Start: 2018-08-09 | End: 2018-09-20 | Stop reason: CLARIF

## 2018-08-09 RX ORDER — SENNOSIDES 8.6 MG
2 TABLET ORAL
Qty: 120 EACH | Refills: 0 | Status: SHIPPED | OUTPATIENT
Start: 2018-08-09 | End: 2018-10-15 | Stop reason: SDUPTHER

## 2018-08-09 RX ORDER — SODIUM CHLORIDE 1000 MG
2 TABLET, SOLUBLE MISCELLANEOUS
Qty: 30 TABLET | Refills: 0 | Status: SHIPPED | OUTPATIENT
Start: 2018-08-09 | End: 2018-10-15 | Stop reason: SDUPTHER

## 2018-08-09 RX ORDER — BISACODYL 10 MG
10 SUPPOSITORY, RECTAL RECTAL ONCE
Status: COMPLETED | OUTPATIENT
Start: 2018-08-09 | End: 2018-08-09

## 2018-08-09 RX ORDER — ALPRAZOLAM 0.5 MG/1
0.5 TABLET ORAL
Qty: 10 TABLET | Refills: 0 | Status: SHIPPED | OUTPATIENT
Start: 2018-08-09 | End: 2019-01-03 | Stop reason: SDUPTHER

## 2018-08-09 RX ORDER — ACETAMINOPHEN 325 MG/1
975 TABLET ORAL 3 TIMES DAILY
Qty: 30 TABLET | Refills: 0
Start: 2018-08-09

## 2018-08-09 RX ORDER — POLYETHYLENE GLYCOL 3350 17 G/17G
17 POWDER, FOR SOLUTION ORAL DAILY
Qty: 14 EACH | Refills: 0 | Status: SHIPPED | OUTPATIENT
Start: 2018-08-10

## 2018-08-09 RX ORDER — OXYCODONE HYDROCHLORIDE 5 MG/1
5 TABLET ORAL EVERY 6 HOURS PRN
Qty: 20 TABLET | Refills: 0 | Status: SHIPPED | OUTPATIENT
Start: 2018-08-09

## 2018-08-09 RX ADMIN — OXYCODONE HYDROCHLORIDE 5 MG: 5 TABLET ORAL at 09:46

## 2018-08-09 RX ADMIN — ALPRAZOLAM 0.12 MG: 0.25 TABLET ORAL at 08:00

## 2018-08-09 RX ADMIN — AMLODIPINE BESYLATE 10 MG: 10 TABLET ORAL at 08:00

## 2018-08-09 RX ADMIN — OXYCODONE HYDROCHLORIDE 2.5 MG: 5 TABLET ORAL at 16:19

## 2018-08-09 RX ADMIN — ALPRAZOLAM 0.12 MG: 0.25 TABLET ORAL at 16:17

## 2018-08-09 RX ADMIN — ACETAMINOPHEN 975 MG: 325 TABLET ORAL at 05:10

## 2018-08-09 RX ADMIN — POLYETHYLENE GLYCOL 3350 17 G: 17 POWDER, FOR SOLUTION ORAL at 08:00

## 2018-08-09 RX ADMIN — SODIUM CHLORIDE TAB 1 GM 2 G: 1 TAB at 08:00

## 2018-08-09 RX ADMIN — SERTRALINE HYDROCHLORIDE 50 MG: 50 TABLET ORAL at 08:00

## 2018-08-09 RX ADMIN — ENOXAPARIN SODIUM 40 MG: 40 INJECTION SUBCUTANEOUS at 08:00

## 2018-08-09 RX ADMIN — SODIUM CHLORIDE TAB 1 GM 2 G: 1 TAB at 16:17

## 2018-08-09 RX ADMIN — BISACODYL 10 MG: 10 SUPPOSITORY RECTAL at 14:11

## 2018-08-09 RX ADMIN — SODIUM CHLORIDE TAB 1 GM 2 G: 1 TAB at 11:32

## 2018-08-09 RX ADMIN — ACETAMINOPHEN 975 MG: 325 TABLET ORAL at 14:11

## 2018-08-09 NOTE — ASSESSMENT & PLAN NOTE
· Most recent echo on our system from 2015 states moderate AS but family states she had recent echo which showed severe AS  · No surgery planned,would need cardiac clearance however discussed with ortho and cards would hold off on consult as discussed with ortho and cards   · Echo completed with ef of 60 % pt noted to have critical aortic stenosis   · As per Cardiology, due to pts functional status pt would not be candidate for cardiac sx dt pts immobilization at this time  · Patient currently asymptomatic    · Should follow with cards outpt

## 2018-08-09 NOTE — DISCHARGE SUMMARY
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  · Patient's sodium this morning increased to 129 from 126  · Continue salt tablets to 2 g t i d  and start fluid restriction at 1500 mL  · Repeat BMP in 3 days  · If sodium worsening or not improving, recommend d/c Sertraline and follow up with Nephrology  · Patient and family Hesitant to d/c sertraline at this time as family reports it has helped her tremendously  * Femoral fracture (HCC)   Assessment & Plan    · Distal femoral fracture, in brace per ortho with no surgical intervention planned  · Pain currently well controlled  · PT recommend rehab  · Outpatient follow-up with Ortho        Dysphagia   Assessment & Plan    Continue modified diet as per speech and swallow recommendations  Hypertension   Assessment & Plan    · Well controlled  Resume home medication on discharge          Diarrhea   Assessment & Plan    · Resolved   · Treated with antibiotics for UTI in July  · Pt has no complaints of diarrhea now    · Pain meds on board atc will need to monitor mental status and constipation         Severe aortic stenosis   Assessment & Plan    · Most recent echo on our system from 2015 states moderate AS but family states she had recent echo which showed severe AS  · No surgery planned,would need cardiac clearance however discussed with ortho and cards would hold off on consult as discussed with ortho and cards   · Echo completed with ef of 60 % pt noted to have critical aortic stenosis   · As per Cardiology, due to pts functional status pt would not be candidate for cardiac sx dt pts immobilization at this time  · Patient currently asymptomatic  · Should follow with cards outpt               Discharging Physician / Practitioner: Haritha Correa MD  PCP: Prudence Stanford  Admission Date:   Admission Orders     Ordered        08/02/18 2037  Inpatient Admission  Once             Discharge Date: 08/09/18    Resolved Problems  Date Reviewed: 8/9/2018    None          Consultations During Hospital Stay:  · ortho    Procedures Performed:     · none    Significant Findings / Test Results:     XR knee 1 or 2 vw right   Final Result by Jaswinder Hedrick MD (08/03 1041)      Increased size of joint effusion  Periprosthetic femoral fracture redemonstrated  Workstation performed: [id number]         CT knee right wo contrast   Final Result by Sacha Malhotra MD (08/03 0840)   There is an oblique mildly displaced periprosthetic fracture of the distal medial femur with the fracture gap of about 7 mm  No evidence of component loosening      Osteopenia    Lipohemarthrosis   The study was marked in EPIC for significant notification  Workstation performed: [id number]         XR chest portable   Final Result by Dolly Coffey MD (08/03 0739)      No acute cardiopulmonary disease  Workstation performed: [id number]         XR hand 3+ vw right   Final Result by Stanford Root MD (08/02 2247)      No acute osseous abnormality  Workstation performed: [id number]              Incidental Findings:   · none     Test Results Pending at Discharge (will require follow up):   · none     Outpatient Tests Requested:  · BMP in 3 days    Complications:  none    Reason for Admission:  Severe right lower extremity pain after a fall    Hospital Course:     Rodri Allen is a 80 y o  female patient who originally presented to the hospital on 8/2/2018 due to severe right knee and thigh pain after a fall  Was found to right femoral fracture  Was evaluated by Ortho  Recommend conservative management  Patient placed in knee immobilizer  She should follow up with Orthopedic surgery in 2 weeks  Continue current pain regimen as her pain is well controlled  She was also noted to have hyponatremia with sodium of 128 on admission  Exact reason not clear  Last sodium in October of 2015 was 134  Patient was recently started on sertraline for her mood and it helped her a lot  Hypernatremia was possibly due to being on sertraline and also component of SIADH  She was started on salt tablets which she will be discharged on  She was also put on fluid restriction of 1500 mL  This will be continued on discharge  Patient and family were reluctant to stop sertraline for now  She will need close monitoring of her sodium in 3 days  If it continues to drop sertraline and will have to be stopped and patient will need outpatient follow-up with Nephrology  She is currently asymptomatic from hyponatremia  She will need outpatient follow-up with Ortho in 2 weeks, PCP in 1 week and with Nephrology in 2 weeks if sodium continues to be low  Please see above list of diagnoses and related plan for additional information  Condition at Discharge: good     Discharge Day Visit / Exam:     Subjective:  Pt seen and examined by me this morning  Pt complained of  pain when she moves her right lower extremity otherwise she is comfortable  Vitals: Blood Pressure: 132/62 (08/09/18 0711)  Pulse: 80 (08/09/18 0711)  Temperature: 98 2 °F (36 8 °C) (08/09/18 0711)  Temp Source: Oral (08/09/18 0711)  Respirations: 18 (08/09/18 0711)  Height: 5' 1" (154 9 cm) (08/02/18 1928)  Weight - Scale: 53 1 kg (117 lb) (08/02/18 1928)  SpO2: 97 % (08/09/18 0711)     Exam:   Physical Exam    Constitutional: Pt appears well-developed and well-nourished  Not in any acute distress  Cardiovascular: Normal rate, regular rhythm, normal heart sounds  Exam reveals no gallop and no friction rub    No murmur heard   Pulmonary/Chest: Effort normal and breath sounds normal  No respiratory distress  Pt has no wheezes or rales  Abdominal: Soft  Non-distended, Non-tender  Bowel sounds are normal    RLE - knee immobilizer  Neurological: alert and oriented to person, place, and time  Normal strength and sensations  Psychiatric: normal mood and affect  Discussion with Family: son and daughter-in-law at bedside    Discharge instructions/Information to patient and family:   See after visit summary for information provided to patient and family  Provisions for Follow-Up Care:  See after visit summary for information related to follow-up care and any pertinent home health orders  Disposition:     Other: [place]    For Discharges to [facility]:   · [place] [facility] / Silvana Blackwell at [id number] [facility] Physician    Planned Readmission: no     Discharge Statement:  I spent 45 minutes discharging the patient  This time was spent on the day of discharge  I had direct contact with the patient on the day of discharge  Greater than 50% of the total time was spent examining patient, answering all patient questions, arranging and discussing plan of care with patient as well as directly providing post-discharge instructions  Additional time then spent on discharge activities  Discharge Medications:  See after visit summary for reconciled discharge medications provided to patient and family        ** Please Note: This note has been constructed using a voice recognition system **

## 2018-08-09 NOTE — PROGRESS NOTES
Rounded w/ Dr Amaral Like of [facility]  Plan for recheck of sodium at 11am   Possible d/c to rehab later today

## 2018-08-09 NOTE — ASSESSMENT & PLAN NOTE
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

## 2018-08-09 NOTE — PLAN OF CARE
Problem: Potential for Falls  Goal: Patient will remain free of falls  INTERVENTIONS:  - Assess patient frequently for physical needs  -  Identify cognitive and physical deficits and behaviors that affect risk of falls  -  [place] fall precautions as indicated by assessment   - Educate patient/family on patient safety including physical limitations  - Instruct patient to call for assistance with activity based on assessment  - Modify environment to reduce risk of injury  - Consider OT/PT consult to assist with strengthening/mobility   Outcome: Progressing      Problem: Prexisting or High Potential for Compromised Skin Integrity  Goal: Skin integrity is maintained or improved  INTERVENTIONS:  - Identify patients at risk for skin breakdown  - Assess and monitor skin integrity  - Assess and monitor nutrition and hydration status  - Monitor labs (i e  albumin)  - Assess for incontinence   - Turn and reposition patient  - Assist with mobility/ambulation  - Relieve pressure over bony prominences  - Avoid friction and shearing  - Provide appropriate hygiene as needed including keeping skin clean and dry  - Evaluate need for skin moisturizer/barrier cream  - Collaborate with interdisciplinary team (i e  Nutrition, Rehabilitation, etc )   - Patient/family teaching   Outcome: Progressing      Problem: Nutrition/Hydration-ADULT  Goal: Nutrient/Hydration intake appropriate for improving, restoring or maintaining nutritional needs  Monitor and assess patient's nutrition/hydration status for malnutrition (ex- brittle hair, bruises, dry skin, pale skin and conjunctiva, muscle wasting, smooth red tongue, and disorientation)  Collaborate with interdisciplinary team and initiate plan and interventions as ordered  Monitor patient's weight and dietary intake as ordered or per policy  Utilize nutrition screening tool and intervene per policy   Determine patient's food preferences and provide high-protein, high-caloric foods as appropriate       INTERVENTIONS:  - Monitor oral intake, urinary output, labs, and treatment plans  - Assess nutrition and hydration status and recommend course of action  - Evaluate amount of meals eaten  - Assist patient with eating if necessary   - Allow adequate time for meals  - Recommend/ encourage appropriate diets, oral nutritional supplements, and vitamin/mineral supplements  - Order, calculate, and assess calorie counts as needed  - Recommend, monitor, and adjust tube feedings and TPN/PPN based on assessed needs  - Assess need for intravenous fluids  - Provide specific nutrition/hydration education as appropriate  - Include patient/family/caregiver in decisions related to nutrition   Outcome: Progressing      Problem: PAIN - ADULT  Goal: Verbalizes/displays adequate comfort level or baseline comfort level  Interventions:  - Encourage patient to monitor pain and request assistance  - Assess pain using appropriate pain scale  - Administer analgesics based on type and severity of pain and evaluate response  - Implement non-pharmacological measures as appropriate and evaluate response  - Consider cultural and social influences on pain and pain management  - Notify physician/advanced practitioner if interventions unsuccessful or patient reports new pain   Outcome: Progressing      Problem: INFECTION - ADULT  Goal: Absence or prevention of progression during hospitalization  INTERVENTIONS:  - Assess and monitor for signs and symptoms of infection  - Monitor lab/diagnostic results  - Monitor all insertion sites, i e  indwelling lines, tubes, and drains  - Monitor endotracheal (as able) and nasal secretions for changes in amount and color  - [place] appropriate cooling/warming therapies per order  - Administer medications as ordered  - Instruct and encourage patient and family to use good hand hygiene technique  - Identify and instruct in appropriate isolation precautions for identified infection/condition Outcome: Progressing    Goal: Absence of fever/infection during neutropenic period  INTERVENTIONS:  - Monitor WBC  - Implement neutropenic guidelines   Outcome: Progressing      Problem: SAFETY ADULT  Goal: Maintain or return to baseline ADL function  INTERVENTIONS:  -  Assess patient's ability to carry out ADLs; assess patient's baseline for ADL function and identify physical deficits which impact ability to perform ADLs (bathing, care of mouth/teeth, toileting, grooming, dressing, etc )  - Assess/evaluate cause of self-care deficits   - Assess range of motion  - Assess patient's mobility; develop plan if impaired  - Assess patient's need for assistive devices and provide as appropriate  - Encourage maximum independence but intervene and supervise when necessary  ¯ Involve family in performance of ADLs  ¯ Assess for home care needs following discharge   ¯ Request OT consult to assist with ADL evaluation and planning for discharge  ¯ Provide patient education as appropriate   Outcome: Progressing    Goal: Maintain or return mobility status to optimal level  INTERVENTIONS:  - Assess patient's baseline mobility status (ambulation, transfers, stairs, etc )    - Identify cognitive and physical deficits and behaviors that affect mobility  - Identify mobility aids required to assist with transfers and/or ambulation (gait belt, sit-to-stand, lift, walker, cane, etc )  - [place] fall precautions as indicated by assessment  - Record patient progress and toleration of activity level on Mobility SBAR; progress patient to next Phase/Stage  - Instruct patient to call for assistance with activity based on assessment  - Request Rehabilitation consult to assist with strengthening/weightbearing, etc    Outcome: Progressing      Problem: DISCHARGE PLANNING  Goal: Discharge to home or other facility with appropriate resources  INTERVENTIONS:  - Identify barriers to discharge w/patient and caregiver  - Arrange for needed discharge resources and transportation as appropriate  - Identify discharge learning needs (meds, wound care, etc )  - Arrange for interpretive services to assist at discharge as needed  - Refer to Case Management Department for coordinating discharge planning if the patient needs post-hospital services based on physician/advanced practitioner order or complex needs related to functional status, cognitive ability, or social support system   Outcome: Progressing      Problem: Knowledge Deficit  Goal: Patient/family/caregiver demonstrates understanding of disease process, treatment plan, medications, and discharge instructions  Complete learning assessment and assess knowledge base    Interventions:  - Provide teaching at level of understanding  - Provide teaching via preferred learning methods   Outcome: Progressing      Problem: METABOLIC, FLUID AND ELECTROLYTES - ADULT  Goal: Electrolytes maintained within normal limits  INTERVENTIONS:  - Monitor labs and assess patient for signs and symptoms of electrolyte imbalances  - Administer electrolyte replacement as ordered  - Monitor response to electrolyte replacements, including repeat lab results as appropriate  - Instruct patient on fluid and nutrition as appropriate   Outcome: Progressing    Goal: Fluid balance maintained  INTERVENTIONS:  - Monitor labs and assess for signs and symptoms of volume excess or deficit  - Monitor I/O and WT  - Instruct patient on fluid and nutrition as appropriate   Outcome: Progressing      Problem: DISCHARGE PLANNING - CARE MANAGEMENT  Goal: Discharge to post-acute care or home with appropriate resources  INTERVENTIONS:  - Conduct assessment to determine patient/family and health care team treatment goals, and need for post-acute services based on payer coverage, community resources, and patient preferences, and barriers to discharge  - Address psychosocial, clinical, and financial barriers to discharge as identified in assessment in conjunction with the patient/family and health care team  - Arrange appropriate level of post-acute services according to patient's   needs and preference and payer coverage in collaboration with the physician and health care team  - Communicate with and update the patient/family, physician, and health care team regarding progress on the discharge plan  - Arrange appropriate transportation to post-acute venues  -Patient to be evaluated   Patient anticipated for d/c home vs  rehab   Outcome: Progressing

## 2018-08-09 NOTE — SOCIAL WORK
Cm reviewed patient during care coordination rounds  Patient is medically stable for d/c today  Cm confirmed that patient will have a bed at [facility] today  Cm contacted patient's insurance company and spoke with Cristino Calvo , who explained that due to code, , patient does not require transportation authorization  Cm contacted Mark Wall at [facility] who stated that they are in network with [facility] and that they could do transport at 4:30pm  Cm informed all parties

## 2018-08-09 NOTE — ASSESSMENT & PLAN NOTE
· Distal femoral fracture, in brace per ortho with no surgical intervention planned  · Pain currently well controlled      · PT recommend rehab  · Outpatient follow-up with Ortho

## 2018-08-10 ENCOUNTER — TRANSITIONAL CARE MANAGEMENT (OUTPATIENT)
Dept: FAMILY MEDICINE CLINIC | Facility: CLINIC | Age: 83
End: 2018-08-10

## 2018-08-10 NOTE — CASE MANAGEMENT
Notification of Inpatient Admission/Inpatient Authorization Request  This is a Notification of Inpatient Admission/Request for Inpatient Authorization to our facility [facility]  Please be advised that this patient is currently in our facility under Inpatient Status  Below you will find the Attending Physician and Facilitys information including NPI# and contact information for the Utilization  assigned to the [facility] where the patient is receiving services  Please feel free to contact the Utilization Review Department with any questions  Patient Information:  PATIENT NAME: Mackenzie Schwartz  MRN: [id number]  YOB: 1928    PRESENTATION DATE: 8/2/2018  7:32 PM  IP ADMISSION DATE: 8/2/18 1932  DISCHARGE DATE: 8/9/2018  4:51 PM   DISPOSITION: Non [facility] SNF/TCU/SNU    Attending Physician:  NICKI Mace  Specialty- Hospitalist,   [facility] ID- [id number]  [address]  Phone 1: [phone]  Fax: [phone], [facility] Registered Nurse Signed   Case Management Date of Service: 8/3/2018 11:20 AM         []Hide copied text  Initial Clinical Review     Admission: Date/Time/Statement: 8/2/18 @ 2034            Orders Placed This Encounter   Procedures    Inpatient Admission       Standing Status:   Standing       Number of Occurrences:   1       Order Specific Question:   Admitting Physician       Answer:   Roxann Cortez [[id number]]       Order Specific Question:   Level of Care       Answer:   Med Surg [16]       Order Specific Question:   Estimated length of stay       Answer:   More than 2 Midnights       Order Specific Question:   Certification       Answer:   I certify that inpatient services are medically necessary for this patient for a duration of greater than two midnights   See H&P and MD Progress Notes for additional information about the patient's course of treatment          Date/Time/Mode of Arrival: 8/2 Transfer from [address] [place] [address] ED     Chief Complaint:  Knee pain     History of Illness: 80 y  o  female with a history of HTN, aortic stenosis, and depression/anxiety who presents with right knee pain s/p fall  Patient was actually at the [address] [place] [address] office when she fell, injuring her knee  Patient is a poor historian and is unsure how she fell but denies syncope  She normally uses a walker  She was found to have right knee periprosthetic knee fracture and was transferred to \[facility]"" for ortho evaluation  Currently she reports knee pain and hand pain  Labs were also significant for hyponatremia      Patient reports diarrhea starting this morning but otherwise has been in her normal state of health  She was on antibiotics in July for UTI  Her family reports that her oral intake is good  No vomiting  She was recently started on sertraline four weeks ago and her family states this has really helped her mood  No prior history of hyponatremia      Has a history of aortic stenosis, which is severe per family      Vital Signs:   Vitals   Temperature Pulse Respirations Blood Pressure SpO2   08/02/18 1928 08/02/18 1928 08/02/18 1928 08/02/18 1928 08/02/18 1928   97 8 °F (36 6 °C) 87 19 136/63 95 %       Temp Source Heart Rate Source Patient Position - Orthostatic VS BP Location FiO2 (%)   08/02/18 1928 08/02/18 2318 08/02/18 1928 08/02/18 1928 --   Oral Monitor Lying Right arm         Pain Score           08/02/18 1953           6                Wt Readings from Last 1 Encounters:   08/02/18 53 1 kg (117 lb)         Vital Signs (abnormal): WNL     Abnormal Labs:     08/02/18 1111     Sodium 136 - 145 mmol/L 128          08/03/18 0439     WBC 4 31 - 10 16 Thousand/uL 16 19     RBC 3 81 - 5 12 Million/uL 3 58     Hemoglobin 11 5 - 15 4 g/dL 10 8     Hematocrit 34 8 - 46 1 % 32 3           Diagnostic Test Results: CT Head - 1   Limited examination due to patient motion    2   Moderate cerebral atrophy with chronic small vessel ischemic change        8/2 Xray Right Knee - Acute distal femoral periprosthetic fracture with mild angulation and small joint effusion      CT Right Knee - There is an oblique mildly displaced periprosthetic fracture of the distal medial femur with the fracture gap of about 7 mm      8/3 Repeat Xray Right Knee - Increased size of joint effusion   Periprosthetic femoral fracture redemonstrated         Past Medical/Surgical History: Active Ambulatory Problems     Diagnosis Date Noted    No Active Ambulatory Problems           Resolved Ambulatory Problems     Diagnosis Date Noted    No Resolved Ambulatory Problems      Past Medical History:   Diagnosis Date    Hypertension      Psychiatric disorder           Admitting Diagnosis: Oth fracture of shaft of unsp femur, init for clos fx ([facility] [address] ) [X56 316A]     Age/Sex: 80 y o  female     Assessment/Plan:       * Femoral fracture (HCC)   Assessment & Plan     · Distal femoral periprosthetic fracture  · Ortho evaluation  · Pain control          Hyponatremia   Assessment & Plan     · No prior history of hyponatremia per family- note patient started on Sertraline 4 weeks ago  · Check sodium studies  · Possibly hypovolemia component given report of diarrhea- will give fluids and monitor sodium  · If sodium worsening or not improving, may need to d/c Sertraline  Hesitant to d/c medication at this time as family reports it has helped her tremendously          Hand pain, right   Assessment & Plan     Obtain x-ray          Hypertension   Assessment & Plan     · Would hold ARB perioperatively  · Continue Norvasc   Monitor BP          Diarrhea   Assessment & Plan     · Treated with antibiotics for UTI in July  · If recurring, check for C diff          Severe aortic stenosis   Assessment & Plan     · Most recent echo on our system from 2015 states moderate AS but family states she had recent echo which showed severe AS  · If surgery planned, likely needs cardiac clearance             VTE Prophylaxis: Enoxaparin (Lovenox)  / sequential compression device   Code Status: DNR/DNI  POLST: There is no POLST form on file for this patient (pre-hospital)  Discussion with family: Family at bedside     Anticipated Length of Stay: Christy Silva will be admitted on an Inpatient basis with an anticipated length of stay of  > 2 midnights    Justification for Hospital Stay: Fracture, hyponatremia        Admission Orders:  NPO; Sips with meds  Echo  Orthopedic Surgery cons  Palliative Care cons  PT/OT eval and treat     Scheduled Meds:   Current Facility-Administered Medications:  acetaminophen 975 mg Oral Q8H [address]   ALPRAZolam 0 125 mg Oral TID   amLODIPine 10 mg Oral Daily   enoxaparin 40 mg Subcutaneous Daily   gabapentin 100 mg Oral HS   oxyCODONE 5 mg Oral 4x Daily   senna 1 tablet Oral HS   sertraline 50 mg Oral Daily      Continuous Infusions:   sodium chloride 100 mL/hr Last Rate: 100 mL/hr (08/03/18 2437)      PRN Meds:   calcium carbonate    HYDROmorphone Iv x2    Ondansetron  Oxycodone po x3     ----------------------------------------------------------------------------------------------------------     8/2 Orthopedic Surgery cons:  Assessment:  80 y  o female status post fall with right periprosthetic distal femur fracture     Plan:   · Non weight bearing right lower extremity in knee immobilizer  · To OR for ORIF of right periprosthetic distal femur fracture  · CT Scan R Knee for pre-operative planning  · Analgesics for pain  · Informed consent obtained  · Pre op labs  · Cardiology consult for clearance  · NPO at midnight     ---------------------------------------------------------------------------------------------------     8/3 Orthopedic progress notes:  Assessment:  80 y  o female status post fall with right periprosthetic distal femur fracture     Plan:   · Non weight bearing right lower extremity in knee immobilizer  · To OR for ORIF of right periprosthetic distal femur fracture  · CT Scan R Knee for pre-operative planning  · Analgesics for pain  · Informed consent obtained  · Pre op labs  · Cardiology consult for clearance  · NPO at midnight     -------------------------------------------------------------------------------------------------  8/3 Palliative Care cons:  Assessment:        Patient Active Problem List   Diagnosis    Femoral fracture (HCC)    Severe aortic stenosis    Hyponatremia    Diarrhea    Hypertension    Hand pain, right    Closed fracture of right distal femur ([address] )         Plan:  1  Agree with Tylenol 975 mg TID and ice to affected area  2  Start oxyIR 5 mg q6H ATC with strict hold parameters  3  Continue IV Dilaudid 0 2 mg but change to q2H PRN for breakthrough pain  4  Start Gabapentin 100 mg qHS  5  Bowel regimen to prevent OIC  6  Patient at risk for delirium given age and co-morbidities, recommend global delirium precautions as follows:              - Establishment of day/night cycle via lights during the day and blinds open   Please limit interruptions at night as medically appropriate               - Patient should be out of bed during the day as tolerated or medically indicated                - Provide glasses/hearing aids as apprioriate                 - Minimize deliriogenic meds as able                - Provide reorientation including date on board and visible clock                - Avoid restraints as able, frequent verbal reorientations or patient care sitter as appropriate               - Consider use of melatonin qHS for circadian rhythm maintenance  7  GOALS- family requesting all information before making final decision  They are unsure if they would want to pursue operative repair given her age and frailty  They re-iterate DNR/DNI status  Will plan to follow up after other consultants   In the meantime, will try to optimize her pain regimen for improved pain control       José Miguel Meraz RN Registered Nurse Signed   Case Management Date of Service: 8/6/2018  2:43 PM         []Hide copied text  Continued Stay Review     Date: 8/6     Vital Signs: /62 (BP Location: Right arm)   Pulse 80   Temp 97 9 °F (36 6 °C) (Oral)   Resp 18   Ht 5' 1" (1 549 m)   Wt 53 1 kg (117 lb)   SpO2 94%   BMI 22 11 kg/m²      Medications:   Scheduled Meds:   Current Facility-Administered Medications:  acetaminophen 975 mg Oral Q8H [address]   ALPRAZolam 0 125 mg Oral TID   amLODIPine 10 mg Oral Daily   docusate sodium 100 mg Oral BID   enoxaparin 40 mg Subcutaneous Daily   gabapentin 100 mg Oral HS   oxyCODONE 5 mg Oral 4x Daily   senna 1 tablet Oral HS   sertraline 50 mg Oral Daily   sodium chloride 1 g Oral TID With Meals      Continuous Infusions:    PRN Meds: calcium carbonate    HYDROmorphone    ondansetron     Abnormal Labs/Diagnostic Results:      Age/Sex: 80 y o  female      Assessment/Plan:   8/6 Palliative & Supportive Care progress notes:       Goals of care, counseling/discussion   Assessment & Plan     - Level 3  - awaiting PT/OT recommendations           Pain   Assessment & Plan     - continue oxycodone 5 mg q4H ATC with strict hold parameters  - continue gabapentin 100 mg qHS  - continue Tylenol 975 mg PO TID  - continue Lidoderm patch and ice packs  - D/C IV Dilaudid and change to oxyIR for PRN use- PLEASE ADMINISTER 30 MINUTES PRIOR TO PT/OT  - bowel regimen to prevent OIC          * Femoral fracture (HCC)   Assessment & Plan     - plan for non-operative management at this time  - PT/OT evaluation pending         Discharge Plan: Pending PT/OT eval and treat

## 2018-08-18 ENCOUNTER — HOSPITAL ENCOUNTER (EMERGENCY)
Facility: HOSPITAL | Age: 83
Discharge: HOME | End: 2018-08-18
Attending: EMERGENCY MEDICINE
Payer: COMMERCIAL

## 2018-08-18 ENCOUNTER — APPOINTMENT (EMERGENCY)
Dept: RADIOLOGY | Facility: HOSPITAL | Age: 83
End: 2018-08-18
Payer: COMMERCIAL

## 2018-08-18 VITALS
DIASTOLIC BLOOD PRESSURE: 78 MMHG | RESPIRATION RATE: 18 BRPM | WEIGHT: 117.06 LBS | HEART RATE: 77 BPM | OXYGEN SATURATION: 97 % | BODY MASS INDEX: 22.12 KG/M2 | TEMPERATURE: 98.5 F | SYSTOLIC BLOOD PRESSURE: 110 MMHG

## 2018-08-18 DIAGNOSIS — M25.561 RIGHT MEDIAL KNEE PAIN: Primary | ICD-10-CM

## 2018-08-18 DIAGNOSIS — S72.433A: ICD-10-CM

## 2018-08-18 PROCEDURE — 96372 THER/PROPH/DIAG INJ SC/IM: CPT

## 2018-08-18 PROCEDURE — 99284 EMERGENCY DEPT VISIT MOD MDM: CPT

## 2018-08-18 PROCEDURE — 73560 X-RAY EXAM OF KNEE 1 OR 2: CPT

## 2018-08-18 RX ORDER — OXYCODONE HYDROCHLORIDE 5 MG/1
5 TABLET ORAL EVERY 4 HOURS PRN
Qty: 30 TABLET | Refills: 0 | Status: SHIPPED | OUTPATIENT
Start: 2018-08-18 | End: 2018-08-28

## 2018-08-18 RX ADMIN — HYDROMORPHONE HYDROCHLORIDE 0.5 MG: 1 INJECTION, SOLUTION INTRAMUSCULAR; INTRAVENOUS; SUBCUTANEOUS at 11:21

## 2018-08-18 NOTE — DISCHARGE INSTRUCTIONS
Leg Fracture   WHAT YOU NEED TO KNOW:   A leg fracture is a break in any of the 3 long bones of your leg  The femur is the largest bone and goes from your hip to your knee  The fibula and tibia are the 2 bones in your lower leg that go from your knee to your ankle  DISCHARGE INSTRUCTIONS:   Seek care immediately if:   · Your leg feels warm, tender, and painful  It may look swollen and red  · The pain in your injured leg gets worse even after you rest and take medicine  · Your cast gets wet or damaged  · Your leg or toes are numb  · The skin or toes of your injured leg become swollen, cold, or blue  Contact your healthcare provider or bone specialist if:   · You have a fever  · Your cast or brace is too tight  · There are new blood stains or a bad smell coming from under the cast     · You have new or worsening trouble moving your leg  · You have questions or concerns about your condition or care  Medicines:   · Prescription pain medicine  may be given  Ask how to take this medicine safely  · NSAIDs , such as ibuprofen, help decrease swelling, pain, and fever  NSAIDs can cause stomach bleeding or kidney problems in certain people  If you take blood thinner medicine, always ask your healthcare provider if NSAIDs are safe for you  Always read the medicine label and follow directions  · Acetaminophen  decreases pain and fever  It is available without a doctor's order  Ask how much to take and how often to take it  Follow directions  Read the labels of all other medicines you are using to see if they also contain acetaminophen, or ask your doctor or pharmacist  Acetaminophen can cause liver damage if not taken correctly  Do not use more than 4 grams (4,000 milligrams) total of acetaminophen in one day  · Take your medicine as directed  Contact your healthcare provider if you think your medicine is not helping or if you have side effects   Tell him of her if you are allergic to any medicine  Keep a list of the medicines, vitamins, and herbs you take  Include the amounts, and when and why you take them  Bring the list or the pill bottles to follow-up visits  Carry your medicine list with you in case of an emergency  Cast or splint care:   · Check the skin around your cast and splint daily for any redness or open areas  · Do not use a sharp or pointed object to scratch your skin under the cast or splint  · Do not remove your splint unless your healthcare provider says it is okay  Bathing with a cast or splint:  Do not let your cast or splint get wet  Before bathing, cover the cast or splint with a plastic bag  Tape the bag to your skin above the cast or splint to seal out the water  Keep your leg out of the water in case the bag leaks  Ask when it is okay to take a bath or shower  Self-care:   · Rest  your leg as directed and avoid activities that cause leg pain  · Apply ice  on your leg for 15 to 20 minutes every hour or as directed  Use an ice pack, or put crushed ice in a plastic bag  Cover it with a towel  Ice helps prevent tissue damage and decreases swelling and pain  · Elevate  your leg above the level of your heart as often as you can  This will help decrease swelling and pain  Prop your leg on pillows or blankets to keep it elevated comfortably  · Use crutches or a walker  as directed  Crutches will help you walk and take some weight off your injured leg while it heals  · Physical therapy  may be recommended  A physical therapist teaches you exercises to help improve movement and strength, and to decrease pain  Follow up with your healthcare provider or bone specialist as directed: You may need to return to have your splint or cast removed  You may need an x-ray of your leg to check how well the bone has healed  Write down your questions so you remember to ask them during your visits    © 2017 [employer] Information is for End User's use only and may not be sold, redistributed or otherwise used for commercial purposes  All illustrations and images included in CareNotes® are the copyrighted property of [employer]  or [facility]  The above information is an  only  It is not intended as medical advice for individual conditions or treatments  Talk to your doctor, nurse or pharmacist before following any medical regimen to see if it is safe and effective for you

## 2018-08-18 NOTE — ED PROVIDER NOTES
History  Chief Complaint   Patient presents with    Knee Pain     right knee pain and swelling, s/p fx right femur with external fixator in place      79-year-old female presents to the emergency department from short-term rehab for evaluation of right knee pain  Patient fell on August 2nd and has a zak prosthesis right distal femur fracture  Patient was admitted to the hospital and evaluated by Orthopedics to place the patient in a right knee immobilizer  No surgical intervention is planned  Patient presents with continued pain  She is taking oxycodone for pain as needed and states that it does help but any time the knee is move she has severe pain  Patient's nurse felt that her knee was more swollen today than yesterday  It is noted the patient did attempt to get up out of bed on her own last night without assistance and screamed out in pain  Patient's family states that at times she does attempt to get up without help but does not recall doing so  Patient denies fevers or chills  No chest pain or shortness of breath  No calf pain or swelling  Upon my initial assessment it appears that the 2nd strap on the distal thigh is very snug and causing some edema of the knee  Patient did have improvement in pain when I loosened the strap slightly  The remainder of the straps are fitted appropriately  No reported fall since the initial injury  Patient's family states that she will be due for x-rays next week  Patient has been performing physical therapy with pain  She is nonweightbearing for the next 6 weeks          History provided by:  Patient, medical records and relative  Leg Pain   Location:  Knee  Time since incident:  2 weeks  Injury: yes    Mechanism of injury: fall    Knee location:  R knee  Chronicity:  New  Relieved by:  Nothing  Associated symptoms: decreased ROM, stiffness and swelling    Associated symptoms: no fever and no numbness        Prior to Admission Medications   Prescriptions Last Dose Informant Patient Reported? Taking? ALPRAZolam (XANAX) 0 5 mg tablet   No Yes   Sig: Take 1 tablet (0 5 mg total) by mouth daily at bedtime as needed for anxiety for up to 10 days   acetaminophen (TYLENOL) 325 mg tablet   No Yes   Sig: Take 3 tablets (975 mg total) by mouth 3 (three) times a day   amLODIPine-valsartan (EXFORGE) 5-320 MG per tablet  Child Yes Yes   Sig: Take 1 tablet by mouth daily   gabapentin (NEURONTIN) 100 mg capsule   No Yes   Sig: Take 2 capsules (200 mg total) by mouth daily at bedtime   oxyCODONE (ROXICODONE) 5 mg immediate release tablet   No Yes   Sig: Take 1 tablet (5 mg total) by mouth every 6 (six) hours as needed for moderate pain or severe pain Max Daily Amount: 20 mg   polyethylene glycol (MIRALAX) 17 g packet   No Yes   Sig: Take 17 g by mouth daily   senna (SENOKOT) 8 6 mg   No Yes   Sig: Take 2 tablets (17 2 mg total) by mouth daily at bedtime   sertraline (ZOLOFT) 25 mg tablet  Child Yes Yes   Sig: Take 50 mg by mouth daily   sodium chloride 1 g tablet   No Yes   Sig: Take 2 tablets (2 g total) by mouth 3 (three) times a day with meals      Facility-Administered Medications: None       Past Medical History:   Diagnosis Date    Hypertension     Psychiatric disorder     anxiety       Past Surgical History:   Procedure Laterality Date     SECTION      CHOLECYSTECTOMY      REPLACEMENT TOTAL KNEE         History reviewed  No pertinent family history  I have reviewed and agree with the history as documented  Social History   Substance Use Topics    Smoking status: Never Smoker    Smokeless tobacco: Never Used    Alcohol use 0 6 oz/week     1 Standard drinks or equivalent per week      Comment: "one gin a day"        Review of Systems   Constitutional: Negative for chills and fever  Musculoskeletal: Positive for arthralgias, gait problem, joint swelling, myalgias and stiffness  Skin: Negative for wound     All other systems reviewed and are negative  Physical Exam  Physical Exam   Constitutional: She is oriented to person, place, and time  She appears well-developed and well-nourished  She appears distressed  Elderly and frail   HENT:   Head: Normocephalic  Right Ear: External ear normal    Left Ear: External ear normal    Nose: Nose normal    Mouth/Throat: Oropharynx is clear and moist    Eyes: EOM and lids are normal  Pupils are equal, round, and reactive to light  Neck: Normal range of motion  Neck supple  Pulmonary/Chest: Effort normal  No respiratory distress  Musculoskeletal: She exhibits no deformity  Right hip: Normal         Right knee: She exhibits decreased range of motion, swelling, effusion and bony tenderness  She exhibits no deformity and no erythema  Tenderness found  Medial joint line, lateral joint line and patellar tendon tenderness noted  Left knee: Normal         Right ankle: Normal         Legs:  Neurological: She is alert and oriented to person, place, and time  Skin: Skin is warm and dry  Psychiatric: She has a normal mood and affect  Nursing note and vitals reviewed        Vital Signs  ED Triage Vitals   Temperature Pulse Respirations Blood Pressure SpO2   08/18/18 1123 08/18/18 1045 08/18/18 1053 08/18/18 1045 08/18/18 1045   98 5 °F (36 9 °C) 76 18 111/70 97 %      Temp Source Heart Rate Source Patient Position - Orthostatic VS BP Location FiO2 (%)   08/18/18 1123 -- 08/18/18 1053 08/18/18 1053 --   Oral  Lying Right arm       Pain Score       08/18/18 1053       5           Vitals:    08/18/18 1045 08/18/18 1053   BP: 111/70 110/78   Pulse: 76 77   Patient Position - Orthostatic VS:  Lying       Visual Acuity      ED Medications  Medications   HYDROmorphone (DILAUDID) injection 0 5 mg (0 5 mg Intramuscular Given 8/18/18 1121)       Diagnostic Studies  Results Reviewed     Procedure Component Value Units Date/Time    LAB RESULTS [[id number]] Resulted:  08/21/18 7856    Lab Status:  Final result Updated:  08/21/18 3057                 XR knee 1 or 2 vw right   Final Result by Darren Murray MD (08/18 1912)      Limited study  Periprosthetic fracture of the distal femur appears essentially unchanged  Workstation performed: [id number]                    Procedures  Procedures       Phone Contacts  ED Phone Contact    ED Course                               MDM  Number of Diagnoses or Management Options  Closed fracture of medial condyle of distal femur ([facility] [address] ): established and worsening  Right medial knee pain: established and worsening     Amount and/or Complexity of Data Reviewed  Tests in the radiology section of CPT®: ordered and reviewed  Decide to obtain previous medical records or to obtain history from someone other than the patient: yes  Obtain history from someone other than the patient: yes  Independent visualization of images, tracings, or specimens: yes    Risk of Complications, Morbidity, and/or Mortality  General comments: 80year old female with nonoperative periprosthetic knee fracture, pain poorly controlled - no radiographic change noted  Will increase pain med to Q4, sweelling of knee and tenderness appropriate for injury, no clinical sign of infection or dvt  Pt  To follow up with orth    Patient Progress  Patient progress: stable    CritCare Time    Disposition  Final diagnoses:   Right medial knee pain   Closed fracture of medial condyle of distal femur ([facility] [address] )     Time reflects when diagnosis was documented in both MDM as applicable and the Disposition within this note     Time User Action Codes Description Comment    8/18/2018 11:40 AM Florinda Santana Add [M2 561] Right medial knee pain     8/18/2018 11:40 AM Etta Haile Add [S78 766A] Closed fracture of medial condyle of distal femur [facility])       ED Disposition     ED Disposition Condition Comment    Discharge  Carmelita Glass discharge to home/self care      Condition at discharge: Stable        Follow-up Information Follow up With Specialties Details Why Joaquim Brunner MD Orthopedic Surgery Schedule an appointment as soon as possible for a visit in 2 days For recheck of current symptoms [facility]  [phone]            Discharge Medication List as of 8/18/2018 11:44 AM      START taking these medications    Details   !! oxyCODONE (ROXICODONE) 5 mg immediate release tablet Take 1 tablet (5 mg total) by mouth every 4 (four) hours as needed for moderate pain for up to 10 days Max Daily Amount: 30 mg, Starting Sat 8/18/2018, Until Tue 8/28/2018, Print       !! - Potential duplicate medications found  Please discuss with provider        CONTINUE these medications which have NOT CHANGED    Details   acetaminophen (TYLENOL) 325 mg tablet Take 3 tablets (975 mg total) by mouth 3 (three) times a day, Starting Thu 8/9/2018, No Print      ALPRAZolam (XANAX) 0 5 mg tablet Take 1 tablet (0 5 mg total) by mouth daily at bedtime as needed for anxiety for up to 10 days, Starting Thu 8/9/2018, Until Sun 8/19/2018, Print      amLODIPine-valsartan (EXFORGE) 5-320 MG per tablet Take 1 tablet by mouth daily, Historical Med      gabapentin (NEURONTIN) 100 mg capsule Take 2 capsules (200 mg total) by mouth daily at bedtime, Starting Thu 8/9/2018, Print      !! oxyCODONE (ROXICODONE) 5 mg immediate release tablet Take 1 tablet (5 mg total) by mouth every 6 (six) hours as needed for moderate pain or severe pain Max Daily Amount: 20 mg, Starting Thu 8/9/2018, Print      polyethylene glycol (MIRALAX) 17 g packet Take 17 g by mouth daily, Starting Fri 8/10/2018, Print      senna (SENOKOT) 8 6 mg Take 2 tablets (17 2 mg total) by mouth daily at bedtime, Starting Thu 8/9/2018, Print      sertraline (ZOLOFT) 25 mg tablet Take 50 mg by mouth daily, Historical Med      sodium chloride 1 g tablet Take 2 tablets (2 g total) by mouth 3 (three) times a day with meals, Starting Thu 8/9/2018, Print       !! - Potential duplicate medications found  Please discuss with provider  No discharge procedures on file      ED Provider  Electronically Signed by           Ace Manley DO  08/21/18 4381

## 2018-08-18 NOTE — ED NOTES
Call placed to [facility] for transport back to [place] ([address])     Trisha Mcnamara RN  08/18/18 7206

## 2018-08-31 ENCOUNTER — TRANSCRIBE ORDERS (OUTPATIENT)
Dept: RADIOLOGY | Facility: CLINIC | Age: 83
End: 2018-08-31

## 2018-08-31 ENCOUNTER — APPOINTMENT (OUTPATIENT)
Dept: LAB | Facility: CLINIC | Age: 83
End: 2018-08-31
Payer: COMMERCIAL

## 2018-08-31 DIAGNOSIS — E87.1 HYPOSMOLALITY SYNDROME: ICD-10-CM

## 2018-08-31 DIAGNOSIS — E87.1 HYPOSMOLALITY SYNDROME: Primary | ICD-10-CM

## 2018-08-31 LAB
ANION GAP SERPL CALCULATED.3IONS-SCNC: 10 MMOL/L (ref 4–13)
BACTERIA UR QL AUTO: ABNORMAL /HPF
BILIRUB UR QL STRIP: NEGATIVE
BUN SERPL-MCNC: 8 MG/DL (ref 5–25)
CALCIUM SERPL-MCNC: 9.4 MG/DL (ref 8.3–10.1)
CHLORIDE SERPL-SCNC: 93 MMOL/L (ref 100–108)
CLARITY UR: ABNORMAL
CO2 SERPL-SCNC: 28 MMOL/L (ref 21–32)
COLOR UR: ABNORMAL
CREAT SERPL-MCNC: 0.37 MG/DL (ref 0.6–1.3)
GFR SERPL CREATININE-BSD FRML MDRD: 94 ML/MIN/1.73SQ M
GLUCOSE SERPL-MCNC: 115 MG/DL (ref 65–140)
GLUCOSE UR STRIP-MCNC: NEGATIVE MG/DL
HGB UR QL STRIP.AUTO: ABNORMAL
KETONES UR STRIP-MCNC: NEGATIVE MG/DL
LEUKOCYTE ESTERASE UR QL STRIP: ABNORMAL
NITRITE UR QL STRIP: POSITIVE
NON-SQ EPI CELLS URNS QL MICRO: ABNORMAL /HPF
PH UR STRIP.AUTO: 7.5 [PH] (ref 4.5–8)
POTASSIUM SERPL-SCNC: 4.4 MMOL/L (ref 3.5–5.3)
PROT UR STRIP-MCNC: ABNORMAL MG/DL
RBC #/AREA URNS AUTO: ABNORMAL /HPF
SODIUM SERPL-SCNC: 131 MMOL/L (ref 136–145)
SP GR UR STRIP.AUTO: 1.02 (ref 1–1.03)
UROBILINOGEN UR QL STRIP.AUTO: 1 E.U./DL
WBC #/AREA URNS AUTO: ABNORMAL /HPF

## 2018-08-31 PROCEDURE — 81001 URINALYSIS AUTO W/SCOPE: CPT | Performed by: FAMILY MEDICINE

## 2018-08-31 PROCEDURE — 80048 BASIC METABOLIC PNL TOTAL CA: CPT

## 2018-08-31 PROCEDURE — 36415 COLL VENOUS BLD VENIPUNCTURE: CPT

## 2018-09-04 ENCOUNTER — APPOINTMENT (EMERGENCY)
Dept: CT IMAGING | Facility: HOSPITAL | Age: 83
DRG: 871 | End: 2018-09-04
Payer: COMMERCIAL

## 2018-09-04 ENCOUNTER — TELEPHONE (OUTPATIENT)
Dept: FAMILY MEDICINE CLINIC | Facility: CLINIC | Age: 83
End: 2018-09-04

## 2018-09-04 ENCOUNTER — HOSPITAL ENCOUNTER (INPATIENT)
Facility: HOSPITAL | Age: 83
LOS: 3 days | Discharge: HOME HEALTH | DRG: 871 | End: 2018-09-07
Attending: INTERNAL MEDICINE | Admitting: INTERNAL MEDICINE
Payer: COMMERCIAL

## 2018-09-04 DIAGNOSIS — N39.0 UTI (URINARY TRACT INFECTION): Primary | ICD-10-CM

## 2018-09-04 DIAGNOSIS — R41.0 DELIRIUM: ICD-10-CM

## 2018-09-04 PROBLEM — R65.10 SIRS (SYSTEMIC INFLAMMATORY RESPONSE SYNDROME) (HCC): Status: ACTIVE | Noted: 2018-09-04

## 2018-09-04 PROBLEM — G93.40 ENCEPHALOPATHY: Status: ACTIVE | Noted: 2018-09-04

## 2018-09-04 PROBLEM — A41.9 SEPSIS (HCC): Status: ACTIVE | Noted: 2018-09-04

## 2018-09-04 LAB
ALBUMIN SERPL BCP-MCNC: 2.6 G/DL (ref 3.5–5)
ALP SERPL-CCNC: 85 U/L (ref 46–116)
ALT SERPL W P-5'-P-CCNC: 15 U/L (ref 12–78)
ANION GAP SERPL CALCULATED.3IONS-SCNC: 7 MMOL/L (ref 4–13)
AST SERPL W P-5'-P-CCNC: 17 U/L (ref 5–45)
BACTERIA UR QL AUTO: ABNORMAL /HPF
BASOPHILS # BLD AUTO: 0.02 THOUSANDS/ΜL (ref 0–0.1)
BASOPHILS NFR BLD AUTO: 0 % (ref 0–1)
BILIRUB SERPL-MCNC: 0.4 MG/DL (ref 0.2–1)
BILIRUB UR QL STRIP: NEGATIVE
BUN SERPL-MCNC: 8 MG/DL (ref 5–25)
CALCIUM SERPL-MCNC: 8.7 MG/DL (ref 8.3–10.1)
CHLORIDE SERPL-SCNC: 93 MMOL/L (ref 100–108)
CLARITY UR: ABNORMAL
CO2 SERPL-SCNC: 29 MMOL/L (ref 21–32)
COLOR UR: YELLOW
CREAT SERPL-MCNC: 0.45 MG/DL (ref 0.6–1.3)
EOSINOPHIL # BLD AUTO: 0.1 THOUSAND/ΜL (ref 0–0.61)
EOSINOPHIL NFR BLD AUTO: 1 % (ref 0–6)
ERYTHROCYTE [DISTWIDTH] IN BLOOD BY AUTOMATED COUNT: 13.4 % (ref 11.6–15.1)
GFR SERPL CREATININE-BSD FRML MDRD: 88 ML/MIN/1.73SQ M
GLUCOSE SERPL-MCNC: 105 MG/DL (ref 65–140)
GLUCOSE UR STRIP-MCNC: NEGATIVE MG/DL
HCT VFR BLD AUTO: 30 % (ref 34.8–46.1)
HGB BLD-MCNC: 10.2 G/DL (ref 11.5–15.4)
HGB UR QL STRIP.AUTO: ABNORMAL
IMM GRANULOCYTES # BLD AUTO: 0.05 THOUSAND/UL (ref 0–0.2)
IMM GRANULOCYTES NFR BLD AUTO: 0 % (ref 0–2)
KETONES UR STRIP-MCNC: NEGATIVE MG/DL
LACTATE SERPL-SCNC: 1.1 MMOL/L (ref 0.5–2)
LEUKOCYTE ESTERASE UR QL STRIP: ABNORMAL
LYMPHOCYTES # BLD AUTO: 2.02 THOUSANDS/ΜL (ref 0.6–4.47)
LYMPHOCYTES NFR BLD AUTO: 16 % (ref 14–44)
MCH RBC QN AUTO: 29.2 PG (ref 26.8–34.3)
MCHC RBC AUTO-ENTMCNC: 34 G/DL (ref 31.4–37.4)
MCV RBC AUTO: 86 FL (ref 82–98)
MONOCYTES # BLD AUTO: 0.77 THOUSAND/ΜL (ref 0.17–1.22)
MONOCYTES NFR BLD AUTO: 6 % (ref 4–12)
NEUTROPHILS # BLD AUTO: 9.62 THOUSANDS/ΜL (ref 1.85–7.62)
NEUTS SEG NFR BLD AUTO: 77 % (ref 43–75)
NITRITE UR QL STRIP: POSITIVE
NON-SQ EPI CELLS URNS QL MICRO: ABNORMAL /HPF
NRBC BLD AUTO-RTO: 0 /100 WBCS
PH UR STRIP.AUTO: 6.5 [PH] (ref 4.5–8)
PLATELET # BLD AUTO: 350 THOUSANDS/UL (ref 149–390)
PMV BLD AUTO: 8.9 FL (ref 8.9–12.7)
POTASSIUM SERPL-SCNC: 3.6 MMOL/L (ref 3.5–5.3)
PROT SERPL-MCNC: 6.6 G/DL (ref 6.4–8.2)
PROT UR STRIP-MCNC: ABNORMAL MG/DL
RBC # BLD AUTO: 3.49 MILLION/UL (ref 3.81–5.12)
RBC #/AREA URNS AUTO: ABNORMAL /HPF
SODIUM SERPL-SCNC: 129 MMOL/L (ref 136–145)
SP GR UR STRIP.AUTO: 1.02 (ref 1–1.03)
UROBILINOGEN UR QL STRIP.AUTO: 0.2 E.U./DL
WBC # BLD AUTO: 12.58 THOUSAND/UL (ref 4.31–10.16)
WBC #/AREA URNS AUTO: ABNORMAL /HPF

## 2018-09-04 PROCEDURE — 83605 ASSAY OF LACTIC ACID: CPT | Performed by: PHYSICIAN ASSISTANT

## 2018-09-04 PROCEDURE — 80053 COMPREHEN METABOLIC PANEL: CPT | Performed by: PHYSICIAN ASSISTANT

## 2018-09-04 PROCEDURE — 85025 COMPLETE CBC W/AUTO DIFF WBC: CPT | Performed by: PHYSICIAN ASSISTANT

## 2018-09-04 PROCEDURE — 96365 THER/PROPH/DIAG IV INF INIT: CPT

## 2018-09-04 PROCEDURE — 87086 URINE CULTURE/COLONY COUNT: CPT | Performed by: PHYSICIAN ASSISTANT

## 2018-09-04 PROCEDURE — 99285 EMERGENCY DEPT VISIT HI MDM: CPT

## 2018-09-04 PROCEDURE — 70450 CT HEAD/BRAIN W/O DYE: CPT

## 2018-09-04 PROCEDURE — 36415 COLL VENOUS BLD VENIPUNCTURE: CPT | Performed by: PHYSICIAN ASSISTANT

## 2018-09-04 PROCEDURE — 87077 CULTURE AEROBIC IDENTIFY: CPT | Performed by: PHYSICIAN ASSISTANT

## 2018-09-04 PROCEDURE — 96361 HYDRATE IV INFUSION ADD-ON: CPT

## 2018-09-04 PROCEDURE — 87186 SC STD MICRODIL/AGAR DIL: CPT | Performed by: PHYSICIAN ASSISTANT

## 2018-09-04 PROCEDURE — 81001 URINALYSIS AUTO W/SCOPE: CPT | Performed by: PHYSICIAN ASSISTANT

## 2018-09-04 PROCEDURE — 87040 BLOOD CULTURE FOR BACTERIA: CPT | Performed by: PHYSICIAN ASSISTANT

## 2018-09-04 PROCEDURE — 99223 1ST HOSP IP/OBS HIGH 75: CPT | Performed by: INTERNAL MEDICINE

## 2018-09-04 RX ORDER — ALPRAZOLAM 0.5 MG/1
0.5 TABLET ORAL
Status: DISCONTINUED | OUTPATIENT
Start: 2018-09-04 | End: 2018-09-07 | Stop reason: HOSPADM

## 2018-09-04 RX ORDER — SODIUM CHLORIDE 1000 MG
2 TABLET, SOLUBLE MISCELLANEOUS
Status: DISCONTINUED | OUTPATIENT
Start: 2018-09-05 | End: 2018-09-07 | Stop reason: HOSPADM

## 2018-09-04 RX ORDER — SODIUM CHLORIDE 9 MG/ML
50 INJECTION, SOLUTION INTRAVENOUS CONTINUOUS
Status: DISCONTINUED | OUTPATIENT
Start: 2018-09-04 | End: 2018-09-05

## 2018-09-04 RX ORDER — GABAPENTIN 100 MG/1
200 CAPSULE ORAL
Status: DISCONTINUED | OUTPATIENT
Start: 2018-09-04 | End: 2018-09-07 | Stop reason: HOSPADM

## 2018-09-04 RX ORDER — HEPARIN SODIUM 5000 [USP'U]/ML
5000 INJECTION, SOLUTION INTRAVENOUS; SUBCUTANEOUS EVERY 8 HOURS SCHEDULED
Status: DISCONTINUED | OUTPATIENT
Start: 2018-09-04 | End: 2018-09-07 | Stop reason: HOSPADM

## 2018-09-04 RX ORDER — ACETAMINOPHEN 325 MG/1
975 TABLET ORAL 3 TIMES DAILY
Status: DISCONTINUED | OUTPATIENT
Start: 2018-09-04 | End: 2018-09-07 | Stop reason: HOSPADM

## 2018-09-04 RX ORDER — AMLODIPINE AND VALSARTAN 5; 320 MG/1; MG/1
1 TABLET ORAL DAILY
Status: DISCONTINUED | OUTPATIENT
Start: 2018-09-05 | End: 2018-09-05 | Stop reason: SDUPTHER

## 2018-09-04 RX ORDER — OXYCODONE HYDROCHLORIDE 5 MG/1
5 TABLET ORAL EVERY 6 HOURS PRN
Status: DISCONTINUED | OUTPATIENT
Start: 2018-09-04 | End: 2018-09-07 | Stop reason: HOSPADM

## 2018-09-04 RX ADMIN — HEPARIN SODIUM 5000 UNITS: 5000 INJECTION, SOLUTION INTRAVENOUS; SUBCUTANEOUS at 21:37

## 2018-09-04 RX ADMIN — SODIUM CHLORIDE 1000 ML: 0.9 INJECTION, SOLUTION INTRAVENOUS at 16:39

## 2018-09-04 RX ADMIN — CEFTRIAXONE 1000 MG: 1 INJECTION, POWDER, FOR SOLUTION INTRAMUSCULAR; INTRAVENOUS at 17:32

## 2018-09-04 RX ADMIN — SODIUM CHLORIDE 50 ML/HR: 0.9 INJECTION, SOLUTION INTRAVENOUS at 21:34

## 2018-09-04 RX ADMIN — ACETAMINOPHEN 975 MG: 325 TABLET, FILM COATED ORAL at 21:37

## 2018-09-04 RX ADMIN — GABAPENTIN 200 MG: 100 CAPSULE ORAL at 21:37

## 2018-09-04 NOTE — Clinical Note
[facility] discharge to home/self care      Condition at discharge: Good

## 2018-09-04 NOTE — ED PROVIDER NOTES
History  Chief Complaint   Patient presents with    Possible UTI     brought in by EMS from home for possible UTI, per EMS family stated that pt has had low u/o     Leeann Haro is a 80 y o  female w PMH HTN, anxiety, dementia who presents for evaluation of possible UTI  Patient presents w her family who reports she had a UA done as outpatient on the 31st for UTI sx but didn't get results until today which were positive  Over those past few days she has had decline in mental status  Normally alert and oriented but is now confused especially at night  History limited from patient  She was recently at rehab and discharged from there, staying with the son to get her strength back after she has broken her femur  Prior to Admission Medications   Prescriptions Last Dose Informant Patient Reported? Taking?    ALPRAZolam (XANAX) 0 5 mg tablet   No No   Sig: Take 1 tablet (0 5 mg total) by mouth daily at bedtime as needed for anxiety for up to 10 days   acetaminophen (TYLENOL) 325 mg tablet   No No   Sig: Take 3 tablets (975 mg total) by mouth 3 (three) times a day   amLODIPine-valsartan (EXFORGE) 5-320 MG per tablet  Child Yes No   Sig: Take 1 tablet by mouth daily   gabapentin (NEURONTIN) 100 mg capsule   No No   Sig: Take 2 capsules (200 mg total) by mouth daily at bedtime   oxyCODONE (ROXICODONE) 5 mg immediate release tablet   No No   Sig: Take 1 tablet (5 mg total) by mouth every 6 (six) hours as needed for moderate pain or severe pain Max Daily Amount: 20 mg   polyethylene glycol (MIRALAX) 17 g packet   No No   Sig: Take 17 g by mouth daily   senna (SENOKOT) 8 6 mg   No No   Sig: Take 2 tablets (17 2 mg total) by mouth daily at bedtime   sertraline (ZOLOFT) 25 mg tablet  Child Yes No   Sig: Take 50 mg by mouth daily   sodium chloride 1 g tablet   No No   Sig: Take 2 tablets (2 g total) by mouth 3 (three) times a day with meals      Facility-Administered Medications: None       Past Medical History: Diagnosis Date    Hypertension     Psychiatric disorder     anxiety       Past Surgical History:   Procedure Laterality Date     SECTION      CHOLECYSTECTOMY      REPLACEMENT TOTAL KNEE         History reviewed  No pertinent family history  I have reviewed and agree with the history as documented  Social History   Substance Use Topics    Smoking status: Never Smoker    Smokeless tobacco: Never Used    Alcohol use 0 6 oz/week     1 Standard drinks or equivalent per week      Comment: "one gin a day"        Review of Systems   Unable to perform ROS: Mental status change       Physical Exam  Physical Exam   Constitutional: She is oriented to person, place, and time  She appears well-developed and well-nourished  No distress  HENT:   Head: Normocephalic and atraumatic  Eyes: Pupils are equal, round, and reactive to light  Neck: Normal range of motion  Neck supple  No tracheal deviation present  Cardiovascular: Normal rate, regular rhythm, normal heart sounds and intact distal pulses  Exam reveals no gallop and no friction rub  No murmur heard  Pulmonary/Chest: Effort normal and breath sounds normal  No respiratory distress  She has no wheezes  She has no rales  Abdominal: Soft  Bowel sounds are normal  She exhibits no distension and no mass  There is no tenderness  There is no guarding  Musculoskeletal: She exhibits no edema or deformity  Neurological: She is alert and oriented to person, place, and time  Patient can tell me her name but does not know where she is and is confused  She does not answer many questions but she does follow commands  Her eyes are closed at rest but open to voice  Skin: Skin is warm and dry  She is not diaphoretic  Psychiatric: She has a normal mood and affect  Her behavior is normal    Nursing note and vitals reviewed        Vital Signs  ED Triage Vitals   Temperature Pulse Respirations Blood Pressure SpO2   18 1523 18 1523 18 1523 09/04/18 1523 09/04/18 1523   98 1 °F (36 7 °C) 92 16 102/57 93 %      Temp Source Heart Rate Source Patient Position - Orthostatic VS BP Location FiO2 (%)   09/04/18 1523 09/04/18 1523 09/04/18 1523 09/04/18 1523 --   Oral Monitor Lying Right arm       Pain Score       09/04/18 2124       No Pain           Vitals:    09/04/18 1523 09/04/18 1818 09/04/18 2137 09/04/18 2300   BP: 102/57 122/58 137/62 113/57   Pulse: 92 87 85 80   Patient Position - Orthostatic VS: Lying Lying Lying Lying       Visual Acuity  Visual Acuity      Most Recent Value   L Pupil Size (mm)  2   R Pupil Size (mm)  2   L Pupil Shape  Round   R Pupil Shape  Round          ED Medications  Medications   acetaminophen (TYLENOL) tablet 975 mg (975 mg Oral Given 9/4/18 2137)   ALPRAZolam (XANAX) tablet 0 5 mg (not administered)   amLODIPine-valsartan (EXFORGE) 5-320 MG per tablet 1 tablet (not administered)   gabapentin (NEURONTIN) capsule 200 mg (200 mg Oral Given 9/4/18 2137)   oxyCODONE (ROXICODONE) IR tablet 5 mg (not administered)   sertraline (ZOLOFT) tablet 50 mg (not administered)   sodium chloride tablet 2 g (not administered)   heparin (porcine) subcutaneous injection 5,000 Units (5,000 Units Subcutaneous Given 9/4/18 2137)   cefTRIAXone (ROCEPHIN) 1,000 mg in dextrose 5 % 50 mL IVPB (not administered)   sodium chloride 0 9 % infusion (50 mL/hr Intravenous New Bag 9/4/18 2134)   sodium chloride 0 9 % bolus 1,000 mL (1,000 mL Intravenous New Bag 9/4/18 1639)   cefTRIAXone (ROCEPHIN) 1,000 mg in dextrose 5 % 50 mL IVPB (0 mg Intravenous Stopped 9/4/18 1802)       Diagnostic Studies  Results Reviewed     Procedure Component Value Units Date/Time    Basic metabolic panel [[id number]]     Lab Status:  No result Specimen:  Blood     Urine Microscopic [[id number]]  (Abnormal) Collected:  09/04/18 1732    Lab Status:  Final result Specimen:  Urine from Urine, Other Updated:  09/04/18 1802     RBC, UA       Field obscured, unable to enumerate (A) /hpf     WBC, UA       Field obscured, unable to enumerate (A)     /hpf     Epithelial Cells       Field obscured, unable to enumerate (A)     /hpf     Bacteria, UA       Field obscured, unable to enumerate (A)     /hpf    UA w Reflex to Microscopic w Reflex to Culture [[id number]]  (Abnormal) Collected:  09/04/18 1732    Lab Status:  Final result Specimen:  Urine from Urine, Other Updated:  09/04/18 1750     Color, UA Yellow     Clarity, UA Cloudy     Specific [place], UA 1 020     pH, UA 6 5     Leukocytes, UA Large (A)     Nitrite, UA Positive (A)     Protein, UA Trace (A) mg/dl      Glucose, UA Negative mg/dl      Ketones, UA Negative mg/dl      Urobilinogen, UA 0 2 E U /dl      Bilirubin, UA Negative     Blood, UA Small (A)    Urine culture [[id number]] Collected:  09/04/18 1732    Lab Status: In process Specimen:  Urine from Urine, Other Updated:  09/04/18 1736    Lactic acid, plasma [[id number]]  (Normal) Collected:  09/04/18 1636    Lab Status:  Final result Specimen:  Blood from Arm, Right Updated:  09/04/18 1707     LACTIC ACID 1 1 mmol/L     Narrative:         Result may be elevated if tourniquet was used during collection      Comprehensive metabolic panel [[id number]]  (Abnormal) Collected:  09/04/18 1636    Lab Status:  Final result Specimen:  Blood from Arm, Right Updated:  09/04/18 1702     Sodium 129 (L) mmol/L      Potassium 3 6 mmol/L      Chloride 93 (L) mmol/L      CO2 29 mmol/L      ANION GAP 7 mmol/L      BUN 8 mg/dL      Creatinine 0 45 (L) mg/dL      Glucose 105 mg/dL      Calcium 8 7 mg/dL      AST 17 U/L      ALT 15 U/L      Alkaline Phosphatase 85 U/L      Total Protein 6 6 g/dL      Albumin 2 6 (L) g/dL      Total Bilirubin 0 40 mg/dL      eGFR 88 ml/min/1 73sq m     Narrative:         National Kidney Disease Education Program recommendations are as follows:  GFR calculation is accurate only with a steady state creatinine  Chronic Kidney disease less than 60 ml/min/1 73 sq  meters  Kidney failure less than 15 ml/min/1 73 sq  meters  CBC and differential [[id number]]  (Abnormal) Collected:  09/04/18 1636    Lab Status:  Final result Specimen:  Blood from Arm, Right Updated:  09/04/18 1648     WBC 12 58 (H) Thousand/uL      RBC 3 49 (L) Million/uL      Hemoglobin 10 2 (L) g/dL      Hematocrit 30 0 (L) %      MCV 86 fL      MCH 29 2 pg      MCHC 34 0 g/dL      RDW 13 4 %      MPV 8 9 fL      Platelets 955 Thousands/uL      nRBC 0 /100 WBCs      Neutrophils Relative 77 (H) %      Immat GRANS % 0 %      Lymphocytes Relative 16 %      Monocytes Relative 6 %      Eosinophils Relative 1 %      Basophils Relative 0 %      Neutrophils Absolute 9 62 (H) Thousands/µL      Immature Grans Absolute 0 05 Thousand/uL      Lymphocytes Absolute 2 02 Thousands/µL      Monocytes Absolute 0 77 Thousand/µL      Eosinophils Absolute 0 10 Thousand/µL      Basophils Absolute 0 02 Thousands/µL     Blood culture #1 [[id number]] Collected:  09/04/18 1636    Lab Status: In process Specimen:  Blood from Arm, Right Updated:  09/04/18 1642    Blood culture #2 [[id number]] Collected:  09/04/18 1638    Lab Status: In process Specimen:  Blood from Arm, Left Updated:  09/04/18 1642                 CT head without contrast   Final Result by Jefferson Merida DO (09/04 1825)      Microangiopathic changes within the brain parenchyma  No mass, hemorrhage or acute territorial infarction  Mild diffuse cerebral volume loss  Workstation performed: [id number]                    Procedures  Procedures       Phone Contacts  ED Phone Contact    ED Course                         Initial Sepsis Screening     [address] Name 09/04/18 1812                Is the patient's history suggestive of a new or worsening infection? (!)  Yes (Proceed)  -EP        Suspected source of infection  --        Are two or more of the following signs & symptoms of infection both present and new to the patient?  (!)  Yes (Proceed)  -EP        Indicate SIRS criteria Leukocytosis (WBC > 18194 IJL); Tachycardia > 90 bpm  -EP        If the answer is yes to both questions, suspicion of sepsis is present  --        If severe sepsis is present AND tissue hypoperfusion perists in the hour after fluid resuscitation or lactate > 4, the patient meets criteria for SEPTIC SHOCK  --        Are any of the following organ dysfunction criteria present within 6 hours of suspected infection and SIRS criteria that are NOT considered to be chronic conditions? --        Organ dysfunction  --        Date of presentation of severe sepsis  --        Time of presentation of severe sepsis  --        Tissue hypoperfusion persists in the hour after crystalloid fluid administration, evidenced, by either:  --        Was hypotension present within one hour of the conclusion of crystalloid fluid administration?  --        Date of presentation of septic shock  --        Time of presentation of septic shock  --          User Key  (r) = Recorded By, (t) = Taken By, (c) = Cosigned By    [address] Name Provider Jacki Schumacher MD Physician                  MDM  Number of Diagnoses or Management Options  Delirium:   UTI (urinary tract infection):   Diagnosis management comments: DDX includes but not ltd to:   Acute mental status change  Suspect this is related to an untreated UTI that went untreated over the holiday weekend  Her UA was positive but no culture was sent  We will recent cultures today, check labs and ensure no signs of sepsis  Plan is to obtain:  CBC to check for anemia, leukocytosis, hydration status  Chemistry panel to check for lyte abnormalities, organ function   Lactic acid as a marker of end organ dysfunction     CT head to check for any acute intracranial abnormality/ ICH/ SAH/ SDH/ lesion/ mass/ CVA     Based on results:  Admit for IV abx, Na slightly low as well which needs to be monitored in inpatient setting given already having acute delirium and mental status changes / cannot trend mentation at home  Portions of the record may have been created with voice recognition software   Occasional wrong word or "sound a like" substitutions may have occurred due to the inherent limitations of voice recognition software   Read the chart carefully and recognize, using context, where substitutions have occurred  CritCare Time    Disposition  Final diagnoses:   UTI (urinary tract infection)   Delirium     Time reflects when diagnosis was documented in both MDM as applicable and the Disposition within this note     Time User Action Codes Description Comment    9/4/2018  6:56 PM Сергей Fresh Add [N39 0] UTI (urinary tract infection)     9/4/2018  6:56 PM Сергей Fresh Add [R41 0] Delirium       ED Disposition     ED Disposition Condition Comment    Admit  Talked to Dr Finn Contreras  Will admit to inpatient under med surg         Follow-up Information    None         Current Discharge Medication List      CONTINUE these medications which have NOT CHANGED    Details   acetaminophen (TYLENOL) 325 mg tablet Take 3 tablets (975 mg total) by mouth 3 (three) times a day  Qty: 30 tablet, Refills: 0    Associated Diagnoses: Closed fracture of distal end of right femur, unspecified fracture morphology, initial encounter ([facility])      ALPRAZolam (XANAX) 0 5 mg tablet Take 1 tablet (0 5 mg total) by mouth daily at bedtime as needed for anxiety for up to 10 days  Qty: 10 tablet, Refills: 0    Associated Diagnoses: Anxiety      amLODIPine-valsartan (EXFORGE) 5-320 MG per tablet Take 1 tablet by mouth daily      gabapentin (NEURONTIN) 100 mg capsule Take 2 capsules (200 mg total) by mouth daily at bedtime  Qty: 30 capsule, Refills: 0    Associated Diagnoses: Closed fracture of distal end of right femur, unspecified fracture morphology, initial encounter ([facility])      oxyCODONE (ROXICODONE) 5 mg immediate release tablet Take 1 tablet (5 mg total) by mouth every 6 (six) hours as needed for moderate pain or severe pain Max Daily Amount: 20 mg  Qty: 20 tablet, Refills: 0    Associated Diagnoses: Closed fracture of distal end of right femur, unspecified fracture morphology, initial encounter ([facility])      polyethylene glycol (MIRALAX) 17 g packet Take 17 g by mouth daily  Qty: 14 each, Refills: 0    Associated Diagnoses: Closed fracture of distal end of right femur, unspecified fracture morphology, initial encounter ([facility])      senna (SENOKOT) 8 6 mg Take 2 tablets (17 2 mg total) by mouth daily at bedtime  Qty: 120 each, Refills: 0    Associated Diagnoses: Closed fracture of distal end of right femur, unspecified fracture morphology, initial encounter ([facility])      sertraline (ZOLOFT) 25 mg tablet Take 50 mg by mouth daily      sodium chloride 1 g tablet Take 2 tablets (2 g total) by mouth 3 (three) times a day with meals  Qty: 30 tablet, Refills: 0    Associated Diagnoses: Hyponatremia           No discharge procedures on file      ED Provider  Electronically Signed by           Sherif Pearson PA-C  09/04/18 4300

## 2018-09-04 NOTE — H&P
H&P- Rodri Allen 7/20/1928, 80 y o  female MRN: [id number]    Unit/Bed#: ED 25 Encounter: [id number]    Primary Care Provider: ROMERO Stanford   Date and time admitted to hospital: 9/4/2018  3:20 PM        UTI (urinary tract infection)   Assessment & Plan    As above rocephin  IVF  Will follow up cultures  Sepsis ([address] )   Assessment & Plan    Tachycardia with HR is 90s  [facility] above 12  Will treat with rocephin for now  Will await cultures  Lactic acid normal         Hyponatremia   Assessment & Plan    Last sodium is 132  Now 129  In the setting of sepsis/UTI  Will give IVF and recheck sodium in 8 hours  Likely hypovolemic  * Encephalopathy   Assessment & Plan    Likely 2/2 UTI  Will treat and follow up CT brain  VTE Prophylaxis: Heparin  / sequential compression device   Code Status: Full Code  POLST: There is no POLST form on file for this patient (pre-hospital)  Discussion with family: Discussed with patient  Discussed with son and daughter in law at bedside  Anticipated Length of Stay:  Patient will be admitted on an Inpatient basis with an anticipated length of stay of  More than  2 midnights  Justification for Hospital Stay: Urosepsis  Total Time for Visit, including Counseling / Coordination of Care: 1 hour  Greater than 50% of this total time spent on direct patient counseling and coordination of care  Chief Complaint:     Altered mental status  Patient " I just don't feel well"    History of Present Illness:    Rodri Allen is a 80 y o  female who presents with altered mental status  Most of the history is taken from the patient's son who is at bedside  The patient was recently discharged from rehab where she had been following a hip fracture  She returned home to stay with her son on Monday from rehab facility at which point he did note that she was not quite herself    He noted that true slightly more confused than what her baseline was and also that she was sundowning more  Although the patient suffers from dementia, she would usually be alert and oriented x3 and only suffers from short-term memory loss, which is transient  Over the last couple of days since she was discharged home from rehab he has noted that she has become increasingly confused and her urine was foul smelling  She unfortunately does not have a primary care physician, as her hip fracture was sustained while she was at the doctor's office waiting for her 1st appointment to establish care  The patient does have a history of frequent UTIs and worsening confusion with these  On presentation to the emergency department today the patient has a leukocytosis with a white cell count of above 12 as well as tachycardia with a heart rate of 92  She is confused at bedside alert and oriented x2 was supposed to alert and oriented x3 which is her baseline  She is answering questions, but appears slightly fidgety and uncomfortable  She is reassured by his son at bedside  Review of Systems:    Review of Systems   Unable to perform ROS: Mental status change   Constitutional: Negative for activity change, appetite change, chills, diaphoresis, fatigue, fever and unexpected weight change  HENT: Negative for congestion, dental problem, drooling, ear discharge, ear pain, facial swelling, hearing loss, nosebleeds, postnasal drip, rhinorrhea, sinus pain, sinus pressure, sneezing and sore throat  Respiratory: Negative for cough, choking, chest tightness, shortness of breath, wheezing and stridor  Gastrointestinal: Negative for abdominal distention         Past Medical and Surgical History:     Past Medical History:   Diagnosis Date    Hypertension     Psychiatric disorder     anxiety       Past Surgical History:   Procedure Laterality Date     SECTION      CHOLECYSTECTOMY      REPLACEMENT TOTAL KNEE         Meds/Allergies:    Prior to Admission medications    Medication Sig Start Date End Date Taking? Authorizing Provider   acetaminophen (TYLENOL) 325 mg tablet Take 3 tablets (975 mg total) by mouth 3 (three) times a day 8/9/18   Dipika Xie MD   ALPRAZolam Cydne Bard) 0 5 mg tablet Take 1 tablet (0 5 mg total) by mouth daily at bedtime as needed for anxiety for up to 10 days 8/9/18 8/19/18  Dipika Xie MD   amLODIPine-valsartan (EXFORGE) 5-320 MG per tablet Take 1 tablet by mouth daily    Historical Provider, MD   gabapentin (NEURONTIN) 100 mg capsule Take 2 capsules (200 mg total) by mouth daily at bedtime 8/9/18   Dipika Xie MD   oxyCODONE (ROXICODONE) 5 mg immediate release tablet Take 1 tablet (5 mg total) by mouth every 6 (six) hours as needed for moderate pain or severe pain Max Daily Amount: 20 mg 8/9/18   Dipika Xie MD   polyethylene glycol (MIRALAX) 17 g packet Take 17 g by mouth daily 8/10/18   Dipika Xie MD   senna (SENOKOT) 8 6 mg Take 2 tablets (17 2 mg total) by mouth daily at bedtime 8/9/18   Dipika Xie MD   sertraline (ZOLOFT) 25 mg tablet Take 50 mg by mouth daily    Historical Provider, MD   sodium chloride 1 g tablet Take 2 tablets (2 g total) by mouth 3 (three) times a day with meals 8/9/18   Dipika Xie MD     I have reviewed home medications with patient personally  Allergies: No Known Allergies    Social History:     Marital Status:    Occupation: retired artist    Patient Pre-hospital Living Situation: lives with son  Patient Pre-hospital Level of Mobility: walker   Patient Pre-hospital Diet Restrictions: none  Substance Use History:   History   Alcohol Use    0 6 oz/week    1 Standard drinks or equivalent per week     Comment: "one gin a day"     History   Smoking Status    Never Smoker   Smokeless Tobacco    Never Used     History   Drug Use No       Family History:    History reviewed  No pertinent family history      Physical Exam:     Vitals:   Blood Pressure: 122/58 (09/04/18 1818)  Pulse: 87 (09/04/18 1818)  Temperature: 98 1 °F (36 7 °C) (09/04/18 1523)  Temp Source: Oral (09/04/18 1523)  Respirations: 16 (09/04/18 1818)  Weight - Scale: 51 2 kg (112 lb 14 oz) (09/04/18 1523)  SpO2: 94 % (09/04/18 1818)    Physical Exam   Constitutional: No distress  Elderly frail  HENT:   Head: Normocephalic and atraumatic  Mouth/Throat: No oropharyngeal exudate  Eyes: Pupils are equal, round, and reactive to light  Right eye exhibits no discharge  Left eye exhibits no discharge  No scleral icterus  Neck: No JVD present  No tracheal deviation present  No thyromegaly present  Cardiovascular: Normal rate  Exam reveals no gallop and no friction rub  No murmur heard  Pulmonary/Chest: Effort normal  No respiratory distress  She has no wheezes  She has no rales  Abdominal: Soft  She exhibits no distension and no mass  There is tenderness  There is no rebound and no guarding  Musculoskeletal: She exhibits no edema, tenderness or deformity  Lymphadenopathy:     She has no cervical adenopathy  Neurological: She is alert  No cranial nerve deficit  Coordination normal    Skin: Skin is warm  No rash noted  She is not diaphoretic  No erythema  There is pallor  Psychiatric: She has a normal mood and affect  Additional Data:     Lab Results: I have personally reviewed pertinent reports  Results from last 7 days  Lab Units 09/04/18  1636   WBC Thousand/uL 12 58*   HEMOGLOBIN g/dL 10 2*   HEMATOCRIT % 30 0*   PLATELETS Thousands/uL 350   NEUTROS PCT % 77*   LYMPHS PCT % 16   MONOS PCT % 6   EOS PCT % 1       Results from last 7 days  Lab Units 09/04/18  1636   SODIUM mmol/L 129*   POTASSIUM mmol/L 3 6   CHLORIDE mmol/L 93*   CO2 mmol/L 29   BUN mg/dL 8   CREATININE mg/dL 0 45*   CALCIUM mg/dL 8 7   ALK PHOS U/L 85   ALT U/L 15   AST U/L 17                   Imaging: I have personally reviewed pertinent reports        CT head without contrast Final Result by Lorrie Ruiz DO (09/04 1825)      Microangiopathic changes within the brain parenchyma  No mass, hemorrhage or acute territorial infarction  Mild diffuse cerebral volume loss  Workstation performed: [id number]             EKG, Pathology, and Other Studies Reviewed on Admission:   · EKG: no EKG on chart  Allscripts / Epic Records Reviewed: Yes     ** Please Note: This note has been constructed using a voice recognition system   **

## 2018-09-04 NOTE — ASSESSMENT & PLAN NOTE
Last sodium is 132  Now 129  In the setting of sepsis/UTI  Will give IVF and recheck sodium in 8 hours  Likely hypovolemic

## 2018-09-04 NOTE — ASSESSMENT & PLAN NOTE
Tachycardia with HR is 90s  [address] [facility] above 12  Will treat with rocephin for now  Will await cultures     Lactic acid normal

## 2018-09-05 LAB
ALBUMIN SERPL BCP-MCNC: 2.4 G/DL (ref 3.5–5)
ALP SERPL-CCNC: 77 U/L (ref 46–116)
ALT SERPL W P-5'-P-CCNC: 13 U/L (ref 12–78)
ANION GAP SERPL CALCULATED.3IONS-SCNC: 5 MMOL/L (ref 4–13)
ANION GAP SERPL CALCULATED.3IONS-SCNC: 6 MMOL/L (ref 4–13)
ANION GAP SERPL CALCULATED.3IONS-SCNC: 8 MMOL/L (ref 4–13)
AST SERPL W P-5'-P-CCNC: 16 U/L (ref 5–45)
BILIRUB SERPL-MCNC: 0.4 MG/DL (ref 0.2–1)
BUN SERPL-MCNC: 4 MG/DL (ref 5–25)
BUN SERPL-MCNC: 5 MG/DL (ref 5–25)
BUN SERPL-MCNC: 6 MG/DL (ref 5–25)
CALCIUM SERPL-MCNC: 8.4 MG/DL (ref 8.3–10.1)
CALCIUM SERPL-MCNC: 8.5 MG/DL (ref 8.3–10.1)
CALCIUM SERPL-MCNC: 8.9 MG/DL (ref 8.3–10.1)
CHLORIDE SERPL-SCNC: 92 MMOL/L (ref 100–108)
CHLORIDE SERPL-SCNC: 93 MMOL/L (ref 100–108)
CHLORIDE SERPL-SCNC: 97 MMOL/L (ref 100–108)
CO2 SERPL-SCNC: 30 MMOL/L (ref 21–32)
CO2 SERPL-SCNC: 30 MMOL/L (ref 21–32)
CO2 SERPL-SCNC: 31 MMOL/L (ref 21–32)
CREAT SERPL-MCNC: 0.37 MG/DL (ref 0.6–1.3)
CREAT SERPL-MCNC: 0.41 MG/DL (ref 0.6–1.3)
CREAT SERPL-MCNC: 0.45 MG/DL (ref 0.6–1.3)
ERYTHROCYTE [DISTWIDTH] IN BLOOD BY AUTOMATED COUNT: 13.4 % (ref 11.6–15.1)
GFR SERPL CREATININE-BSD FRML MDRD: 88 ML/MIN/1.73SQ M
GFR SERPL CREATININE-BSD FRML MDRD: 91 ML/MIN/1.73SQ M
GFR SERPL CREATININE-BSD FRML MDRD: 94 ML/MIN/1.73SQ M
GLUCOSE SERPL-MCNC: 102 MG/DL (ref 65–140)
GLUCOSE SERPL-MCNC: 163 MG/DL (ref 65–140)
GLUCOSE SERPL-MCNC: 89 MG/DL (ref 65–140)
HCT VFR BLD AUTO: 28.9 % (ref 34.8–46.1)
HGB BLD-MCNC: 9.6 G/DL (ref 11.5–15.4)
MCH RBC QN AUTO: 28.7 PG (ref 26.8–34.3)
MCHC RBC AUTO-ENTMCNC: 33.2 G/DL (ref 31.4–37.4)
MCV RBC AUTO: 87 FL (ref 82–98)
PLATELET # BLD AUTO: 321 THOUSANDS/UL (ref 149–390)
PMV BLD AUTO: 8.8 FL (ref 8.9–12.7)
POTASSIUM SERPL-SCNC: 3.1 MMOL/L (ref 3.5–5.3)
POTASSIUM SERPL-SCNC: 3.3 MMOL/L (ref 3.5–5.3)
POTASSIUM SERPL-SCNC: 3.4 MMOL/L (ref 3.5–5.3)
PROT SERPL-MCNC: 6.3 G/DL (ref 6.4–8.2)
RBC # BLD AUTO: 3.34 MILLION/UL (ref 3.81–5.12)
SODIUM SERPL-SCNC: 128 MMOL/L (ref 136–145)
SODIUM SERPL-SCNC: 131 MMOL/L (ref 136–145)
SODIUM SERPL-SCNC: 133 MMOL/L (ref 136–145)
WBC # BLD AUTO: 11.74 THOUSAND/UL (ref 4.31–10.16)

## 2018-09-05 PROCEDURE — 80048 BASIC METABOLIC PNL TOTAL CA: CPT | Performed by: INTERNAL MEDICINE

## 2018-09-05 PROCEDURE — 85027 COMPLETE CBC AUTOMATED: CPT | Performed by: INTERNAL MEDICINE

## 2018-09-05 PROCEDURE — G8979 MOBILITY GOAL STATUS: HCPCS

## 2018-09-05 PROCEDURE — 99232 SBSQ HOSP IP/OBS MODERATE 35: CPT | Performed by: INTERNAL MEDICINE

## 2018-09-05 PROCEDURE — 97163 PT EVAL HIGH COMPLEX 45 MIN: CPT

## 2018-09-05 PROCEDURE — 80053 COMPREHEN METABOLIC PANEL: CPT | Performed by: INTERNAL MEDICINE

## 2018-09-05 PROCEDURE — G8978 MOBILITY CURRENT STATUS: HCPCS

## 2018-09-05 RX ORDER — AMLODIPINE BESYLATE 5 MG/1
5 TABLET ORAL DAILY
Status: DISCONTINUED | OUTPATIENT
Start: 2018-09-05 | End: 2018-09-07 | Stop reason: HOSPADM

## 2018-09-05 RX ORDER — VALSARTAN 160 MG/1
320 TABLET ORAL DAILY
Status: DISCONTINUED | OUTPATIENT
Start: 2018-09-05 | End: 2018-09-07 | Stop reason: HOSPADM

## 2018-09-05 RX ADMIN — SODIUM CHLORIDE TAB 1 GM 2 G: 1 TAB at 17:40

## 2018-09-05 RX ADMIN — ACETAMINOPHEN 975 MG: 325 TABLET, FILM COATED ORAL at 17:39

## 2018-09-05 RX ADMIN — GABAPENTIN 200 MG: 100 CAPSULE ORAL at 21:28

## 2018-09-05 RX ADMIN — SODIUM CHLORIDE TAB 1 GM 2 G: 1 TAB at 12:24

## 2018-09-05 RX ADMIN — CEFTRIAXONE 1000 MG: 1 INJECTION, POWDER, FOR SOLUTION INTRAMUSCULAR; INTRAVENOUS at 17:49

## 2018-09-05 RX ADMIN — HEPARIN SODIUM 5000 UNITS: 5000 INJECTION, SOLUTION INTRAVENOUS; SUBCUTANEOUS at 13:55

## 2018-09-05 RX ADMIN — ACETAMINOPHEN 975 MG: 325 TABLET, FILM COATED ORAL at 20:04

## 2018-09-05 RX ADMIN — SERTRALINE HYDROCHLORIDE 50 MG: 50 TABLET ORAL at 09:17

## 2018-09-05 RX ADMIN — AMLODIPINE BESYLATE 5 MG: 5 TABLET ORAL at 09:22

## 2018-09-05 RX ADMIN — VALSARTAN 320 MG: 160 TABLET, FILM COATED ORAL at 09:19

## 2018-09-05 RX ADMIN — HEPARIN SODIUM 5000 UNITS: 5000 INJECTION, SOLUTION INTRAVENOUS; SUBCUTANEOUS at 21:28

## 2018-09-05 RX ADMIN — HEPARIN SODIUM 5000 UNITS: 5000 INJECTION, SOLUTION INTRAVENOUS; SUBCUTANEOUS at 06:48

## 2018-09-05 RX ADMIN — ACETAMINOPHEN 975 MG: 325 TABLET, FILM COATED ORAL at 09:17

## 2018-09-05 RX ADMIN — POTASSIUM CHLORIDE 20 MEQ: 2 INJECTION, SOLUTION, CONCENTRATE INTRAVENOUS at 09:19

## 2018-09-05 NOTE — DISCHARGE INSTR - OTHER ORDERS
1  Apply hydraguard to b/l heels BID and PRN for prevention and protection  2  Apply skin nourishing cream the entire skin daily for moisture  3  Turn and reposition patient every  2 hours   4  Elevate heels off of bed with pillows to offload pressure   5  Soft care cushion to chair when OOB, if able  6  Cleanse sacrum with soap and water, pat dry, apply calazime to open sacral slit and apply hydraguard to b/l buttocks TID and PRN   7  Assess the skin around RLE brace every shift and PRN  Use non-bordered alleyvn foam dressings to pad the medial and lateral aspects of the knee brace  Change as needed

## 2018-09-05 NOTE — PLAN OF CARE
Problem: Nutrition/Hydration-ADULT  Goal: Nutrient/Hydration intake appropriate for improving, restoring or maintaining nutritional needs  Monitor and assess patient's nutrition/hydration status for malnutrition (ex- brittle hair, bruises, dry skin, pale skin and conjunctiva, muscle wasting, smooth red tongue, and disorientation)  Collaborate with interdisciplinary team and initiate plan and interventions as ordered  Monitor patient's weight and dietary intake as ordered or per policy  Utilize nutrition screening tool and intervene per policy  Determine patient's food preferences and provide high-protein, high-caloric foods as appropriate  INTERVENTIONS:  - Monitor oral intake, urinary output, labs, and treatment plans  - Assess nutrition and hydration status and recommend course of action  - Evaluate amount of meals eaten  - Assist patient with eating if necessary   - Allow adequate time for meals  - Recommend/ encourage appropriate diets, oral nutritional supplements, and vitamin/mineral supplements  - Order, calculate, and assess calorie counts as needed  - Recommend, monitor, and adjust tube feedings and TPN/PPN based on assessed needs  - Assess need for intravenous fluids  - Provide specific nutrition/hydration education as appropriate  - Include patient/family/caregiver in decisions related to nutrition  Outcome: Progressing  Goal: Pt will consume >50% of meals and supplements and avoid wt loss by next review

## 2018-09-05 NOTE — ASSESSMENT & PLAN NOTE
Likely 2/2 UTI  Will treat and follow up CT brain  Much improved today, answering questions appropriately

## 2018-09-05 NOTE — CONSULTS
Progress Note - Wound   Nika Sullivan 80 y o  female MRN: [id number]  Unit/Bed#: -01 Encounter: [id number]      Assessment:  Wound care consulted for assessment of pressure injury noted on admission  Patient seen in bed, cooperative with assessment - incontinent at baseline - pure wick in place  Assist of 1 with turning  Wedges in use, accu-max pump to bed  Knee brace noted to RLE - patient to wear at all times per nursing  B/l heels are intact with no redness or wounds noted  Recommend offloading and hydraguard cream      Linear shaped partial thickness wound related to moisture noted to the sacral slit  Wound bed is 100% pink, non-tender, and is blanchable  Edges flat attached intact  Zeenat-wound is intact with blanchable pink skin that is non-macerated  The buttocks are intact with no redness or wounds  Recommend calazime cream      PT at bedside to assess + adjust R knee brace  Skin is intact to medial and lateral aspects of the knee  Due to edema/positioning of the brace, concern for skin breakdown  Collaborated with PT to determine cushioning to these aspects  Non-bordered foam applied to the lateral and medial aspects of the brace  Will need frequent skin checks  No induration, fluctuance, redness, or purulence noted to the above noted wounds  Patient tolerated well  Primary nurse aware of plan of care  See flow sheets for more detailed assessment findings  Will follow along  Plan:   1  Apply hydraguard to b/l heels BID and PRN for prevention and protection  2  Apply skin nourishing cream the entire skin daily for moisture  3  Turn and reposition patient every  2 hours   4  Elevate heels off of bed with pillows to offload pressure   5  Soft care cushion to chair when OOB, if able  6  Cleanse sacrum with soap and water, pat dry, apply calazime to open sacral slit and apply hydraguard to b/l buttocks TID and PRN   7  Assess the skin around RLE brace every shift and PRN   Use non-bordered alleyvn foam dressings to pad the medial and lateral aspects of the knee brace  Change as needed  8  Wound care will follow along with patient weekly, please call with questions and concerns [phone]    Objective:    Vitals: Blood pressure 123/61, pulse 80, temperature 97 9 °F (36 6 °C), temperature source Oral, resp  rate 17, height 5' 4" (1 626 m), weight 54 3 kg (119 lb 11 4 oz), SpO2 97 %  ,Body mass index is 20 55 kg/m²  Wound 09/04/18 Other (Comment) Sacrum Mid (Active)   Wound Description Pink 9/5/2018  7:00 AM   Zeenat-wound Assessment Clean;Dry; Intact 9/5/2018  7:00 AM   Shape linear  9/5/2018  7:00 AM   Wound Length (cm) 1 cm 9/5/2018  7:00 AM   Wound Width (cm) 0 1 cm 9/5/2018  7:00 AM   Wound Depth (cm) 0 1 9/5/2018  7:00 AM   Calculated Wound Volume (cm^3) 0 01 cm^3 9/5/2018  7:00 AM   Tunneling 0 cm 9/5/2018  7:00 AM   Undermining 0 9/5/2018  7:00 AM   Closure None 9/5/2018  7:00 AM   Drainage Amount None 9/5/2018  7:00 AM   Non-staged Wound Description Partial thickness 9/5/2018  7:00 AM   Treatments Cleansed;Site care 9/5/2018  7:00 AM   Dressing Protective barrier 9/5/2018  7:00 AM   Wound packed? No 9/5/2018  7:00 AM   Packing- # removed 0 9/5/2018  7:00 AM   Packing- # inserted 0 9/5/2018  7:00 AM   Patient Tolerance Tolerated well 9/5/2018  7:00 AM   Dressing Status Open to air 9/5/2018  7:00 AM     Recommendations written as orders  AVS updated    Richard Saez RN BSN CWON

## 2018-09-05 NOTE — PLAN OF CARE
CARDIOVASCULAR - ADULT     Maintains optimal cardiac output and hemodynamic stability Progressing     Absence of cardiac dysrhythmias or at baseline rhythm Progressing        DISCHARGE PLANNING     Discharge to home or other facility with appropriate resources Progressing        INFECTION - ADULT     Absence or prevention of progression during hospitalization Progressing        Knowledge Deficit     Patient/family/caregiver demonstrates understanding of disease process, treatment plan, medications, and discharge instructions Progressing        MUSCULOSKELETAL - ADULT     Maintain or return mobility to safest level of function Progressing     Maintain proper alignment of affected body part Progressing        PAIN - ADULT     Verbalizes/displays adequate comfort level or baseline comfort level Progressing        Potential for Falls     Patient will remain free of falls Progressing        Prexisting or High Potential for Compromised Skin Integrity     Skin integrity is maintained or improved Progressing        SAFETY ADULT     Maintain or return to baseline ADL function Progressing     Maintain or return mobility status to optimal level Progressing        SKIN/TISSUE INTEGRITY - ADULT     Skin integrity remains intact Progressing     Incision(s), wounds(s) or drain site(s) healing without S/S of infection Progressing     Oral mucous membranes remain intact Progressing

## 2018-09-05 NOTE — PLAN OF CARE
Problem: PHYSICAL THERAPY ADULT  Goal: Performs mobility at highest level of function for planned discharge setting  See evaluation for individualized goals  Treatment/Interventions: Functional transfer training, LE strengthening/ROM, Therapeutic exercise, Endurance training, Patient/family training, Equipment eval/education, Bed mobility, Spoke to nursing          See flowsheet documentation for full assessment, interventions and recommendations  Prognosis: Fair  Problem List: Decreased strength, Decreased range of motion, Decreased endurance, Impaired balance, Decreased mobility, Decreased cognition, Decreased safety awareness, Decreased skin integrity, Orthopedic restrictions, Pain  Assessment: pt is a 89y/o f who presents to [facility] c UTI  recently d/c from [facility] 9/3/18 following R femur fx s/p ORIF early aug 2018  pt remains NWB R LE c hinged knee brace locked in ext  PMH significant for R femur fx s/p ORIF, dysphagia, leukocytosis  PTA, pt utilizing w/c for functional mobility + requires (A) c all tasks  resides c son in ranch home  currently requires mod-max (A)x2 for functional mobility tasks 2* significant deficits in strength, ROM, balance, pain, NWB R LE, + activity tolerance noted in PT exam above  Barthel Index 30/100  attempted sit>stand transition, however pt unable to maintain NWB R LE at this time  may benefit from lateral transfer via slide board next session  will cont skilled PT to further maximize functional mobility + improve quality of life  upon d/c, recommend STR  PT eval of high complexity 2* unstable med status c pt requiring ongoing medical management 2* UTI  pt remains NWB R LE s/p R femur ORIF early aug  pt c increased confusion from baseline  presents c mobility deficits above requiring (A)x2 to complete mobility tasks     Barriers to Discharge: Inaccessible home environment     Recommendation: Short-term skilled PT     PT - OK to Discharge: Yes (to STR when stable )    See flowsheet documentation for full assessment

## 2018-09-05 NOTE — PHYSICAL THERAPY NOTE
PT Evaluation (25min)  (8:00-8:25)    Past Medical History:   Diagnosis Date    Hypertension     Psychiatric disorder     anxiety        09/05/18 0825   Note Type   Note type Eval only   Pain Assessment   Pain Assessment No/denies pain   Home Living   [facility] of [address] [facility] One level;Stairs to enter with rails   Bathroom Equipment Grab bars in shower;Commode   [address]; Wheelchair-manual   Prior Function   Level of [place] Needs assistance with ADLs and functional mobility  (requires (A) for SPT <> w/c)   Lives With Son  ((-) alone)   Receives Help From Family   ADL Assistance Needs assistance   IADLs Needs assistance   Falls in the last 6 months 1 to 4  (early aug 2018 resulting in R hip fx s/p ORIF)   Vocational Retired   Restrictions/Precautions   [place] Bearing Precautions Per Order Yes   RLE [place] Bearing Per Order NWB  (per previous admission aug 2018)   Braces or Orthoses LE Braces  (R LE hinged knee brace locked in ext per previous admission)   Other Precautions Cognitive; Bed Alarm;Multiple lines; Fall Risk;Pain;WBS  (NWB R LE c hinged knee brace locked in ext)   General   Additional Pertinent History pt presents to [facility] c increased confusion + foul smelling urine  dx: UTI  recently admitted to [facility] early aug 2* R femur fx non-op + later d/c to [facility]  d/c home from [facility] 9/3 + was residing c son  NWB R LE c hinged knee brace per previous admission  PT consulted for mobility + d/c planning  Family/Caregiver Present No   Cognition   Orientation Level Oriented to person;Oriented to situation   RUE Assessment   RUE Assessment WFL   LUE Assessment   LUE Assessment WFL   RLE Assessment   RLE Assessment X  (hip flex 2-/5, ankle DF/PF 2-/5)   LLE Assessment   LLE Assessment WFL  (4-/5)   Coordination   Sensation WFL   Bed Mobility   Supine to Sit 3  Moderate assistance   Additional items Assist x 2;HOB elevated; Increased time required;Verbal cues;LE management   Sit to Supine 3  Moderate assistance   Additional items Assist x 2;Verbal cues;LE management   Transfers   Sit to Stand 2  Maximal assistance   Additional items Assist x 2;Verbal cues; Increased time required  (pt non-compliant c NWB R LE)   Stand to Sit 2  Maximal assistance   Additional items Assist x 2;Verbal cues; Increased time required   Ambulation/Elevation   Gait pattern Not appropriate  (pt non-compliant c NWB R LE)   Balance   Static Sitting Fair +   Dynamic Sitting Fair +   Static Standing Poor -   Activity Tolerance   Activity Tolerance Patient limited by fatigue;Patient limited by pain   Nurse Made Aware [place]   Assessment   Prognosis Fair   Problem List Decreased strength;Decreased range of motion;Decreased endurance; Impaired balance;Decreased mobility; Decreased cognition;Decreased safety awareness;Decreased skin integrity;Orthopedic restrictions;Pain   Assessment pt is a 89y/o f who presents to [facility] c UTI  recently d/c from [address] [place] [address] 9/3/18 following R distal femur fx deemed non-op  early aug 2018  pt remains NWB R LE c hinged knee brace locked in ext  PMH significant for R femur fx s/p ORIF, dysphagia, leukocytosis  PTA, pt utilizing w/c for functional mobility + requires (A) c all tasks  resides c son in ranch home  currently requires mod-max (A)x2 for functional mobility tasks 2* significant deficits in strength, ROM, balance, pain, NWB R LE, + activity tolerance noted in PT exam above  Barthel Index 30/100  attempted sit>stand transition, however pt unable to maintain NWB R LE at this time  may benefit from lateral transfer via slide board next session  will cont skilled PT to further maximize functional mobility + improve quality of life  upon d/c, recommend STR  PT eval of high complexity 2* unstable med status c pt requiring ongoing medical management 2* UTI  pt remains NWB R LE s/p R femur ORIF early aug  pt c increased confusion from baseline  presents c mobility deficits above requiring (A)x2 to complete mobility tasks  Barriers to Discharge Inaccessible home environment   Goals   Patient Goals "I want to walk"  STG Expiration Date 09/15/18   Short Term Goal #1 1  increase strength 1/2 grade to improve overall functional mobility, 2  perform bed mobility min (A)x1 to decrease caregiver burden, 3  perform transfers min (A)x1 c most appropriate method while maintaining NWB R % of the time, 4  propel w/c 150' across level surface c (S) to increase (I) c mobility   Plan   Treatment/Interventions Functional transfer training;LE strengthening/ROM; Therapeutic exercise; Endurance training;Patient/family training;Equipment eval/education; Bed mobility;Spoke to nursing   PT Frequency Other (Comment)  (3-5x/wk)   Recommendation   Recommendation Short-term skilled PT   PT - OK to Discharge Yes  (to STR when stable )   Barthel Index   Feeding 5   Bathing 0   Grooming Score 0   Dressing Score 5   Bladder Score 0   Bowels Score 10   Toilet Use Score 5   Transfers (Bed/Chair) Score 5   Mobility (Level Surface) Score 0   Stairs Score 0   Barthel Index Score 30     Kandis Dallas, PT

## 2018-09-05 NOTE — CASE MANAGEMENT
Initial Clinical Review    Admission: Date/Time/Statement: 9/4/18 @ 1816     Orders Placed This Encounter   Procedures    Inpatient Admission     Standing Status:   Standing     Number of Occurrences:   1     Order Specific Question:   Admitting Physician     Answer:   Ameya Zaldivar     Order Specific Question:   Level of Care     Answer:   Med Surg [16]     Order Specific Question:   Estimated length of stay     Answer:   More than 2 Midnights     Order Specific Question:   Certification     Answer:   I certify that inpatient services are medically necessary for this patient for a duration of greater than two midnights  See H&P and MD Progress Notes for additional information about the patient's course of treatment  ED: Date/Time/Mode of Arrival:   ED Arrival Information     Expected Arrival Acuity Means of Arrival Escorted By Service Admission Type    - 9/4/2018 15:20 Urgent Ambulance [address] Urgent    Arrival Complaint    UTI        Chief Complaint:   Chief Complaint   Patient presents with    Possible UTI     brought in by EMS from home for possible UTI, per EMS family stated that pt has had low u/o     History of Illness: Jacob Conn is a 80 y o  female who presents with altered mental status  Most of the history is taken from the patient's son who is at bedside  The patient was recently discharged from rehab where she had been following a hip fracture  She returned home to stay with her son on Monday from rehab facility at which point he did note that she was not quite herself  He noted that true slightly more confused than what her baseline was and also that she was sundowning more  Although the patient suffers from dementia, she would usually be alert and oriented x3 and only suffers from short-term memory loss, which is transient    Over the last couple of days since she was discharged home from rehab he has noted that she has become increasingly confused and her urine was foul smelling  She unfortunately does not have a primary care physician, as her hip fracture was sustained while she was at the doctor's office waiting for her 1st appointment to establish care  The patient does have a history of frequent UTIs and worsening confusion with these  On presentation to the emergency department today the patient has a leukocytosis with a white cell count of above 12 as well as tachycardia with a heart rate of 92  She is confused at bedside alert and oriented x2 was supposed to alert and oriented x3 which is her baseline       ED Vital Signs:   ED Triage Vitals   Temperature Pulse Respirations Blood Pressure SpO2   09/04/18 1523 09/04/18 1523 09/04/18 1523 09/04/18 1523 09/04/18 1523   98 1 °F (36 7 °C) 92 16 102/57 93 %      Temp Source Heart Rate Source Patient Position - Orthostatic VS BP Location FiO2 (%)   09/04/18 1523 09/04/18 1523 09/04/18 1523 09/04/18 1523 --   Oral Monitor Lying Right arm       Pain Score       09/04/18 2124       No Pain        Wt Readings from Last 1 Encounters:   09/05/18 54 kg (119 lb)     Abnormal Labs:   9/4 9/5    Sodium 129 133 131   Potassium 3 6 3 3 3 4   Chloride 93 97 93   Creatinine 0 45 0 37 0 41   Total Protein 6 6 6 3    Albumin 2 6 2 4      WBC 12 58    11 74      RBC 3 49 3 34   Hemoglobin 10 2 9 6   HCT 30 0 28 9       09/04/18 1732   Color, UA Yellow    Clarity, UA Cloudy    Specific [place], UA 1 020    pH, UA 6 5    Leukocytes, UA Large     Nitrite, UA Positive     Protein, UA Trace     Glucose, UA Negative    Ketones, UA Negative    Urobilinogen, UA 0 2    Bilirubin, UA Negative    Blood, UA Small       BLOOD AND URINE CULTURES PENDING     Diagnostic Test Results:     9/4 CT head - Microangiopathic changes within the brain parenchyma  No mass, hemorrhage or acute territorial infarction  Mild diffuse cerebral volume loss      ED Treatment:   Medication Administration from 09/04/2018 1520 to 09/04/2018 7105    Date/Time Order Dose Route Action   09/04/2018 1639 sodium chloride 0 9 % bolus 1,000 mL 1,000 mL Intravenous New Bag   09/04/2018 1732 cefTRIAXone (ROCEPHIN) 1,000 mg in dextrose 5 % 50 mL IVPB 1,000 mg Intravenous New Bag        Past Medical/Surgical History: Active Ambulatory Problems     Diagnosis Date Noted    Femoral fracture ([facility] [address] ) 08/02/2018    Severe aortic stenosis 08/02/2018    Hyponatremia 08/02/2018    Diarrhea 08/02/2018    Hypertension 08/02/2018    Hand pain, right 08/02/2018    Pain 08/06/2018    Goals of care, counseling/discussion 08/06/2018    Dysphagia 08/06/2018    Leukocytosis 08/07/2018     Resolved Ambulatory Problems     Diagnosis Date Noted    No Resolved Ambulatory Problems     Past Medical History:   Diagnosis Date    Hypertension     Psychiatric disorder      Admitting Diagnosis: Delirium [R41 0]  UTI (urinary tract infection) [N39 0]    Age/Sex: 80 y o  female    Assessment/Plan:   UTI (urinary tract infection)   Assessment & Plan     As above rocephin  IVF  Will follow up cultures          Sepsis ([facility] )   Assessment & Plan     Tachycardia with HR is 90s  [address] above 12  Will treat with rocephin for now  Will await cultures  Lactic acid normal         Hyponatremia   Assessment & Plan     Last sodium is 132  Now 129  In the setting of sepsis/UTI  Will give IVF and recheck sodium in 8 hours  Likely hypovolemic          * Encephalopathy   Assessment & Plan     Likely 2/2 UTI  Will treat and follow up CT brain          VTE Prophylaxis: Heparin  / sequential compression device   Code Status: Full Code  Anticipated Length of Stay:  Patient will be admitted on an Inpatient basis with an anticipated length of stay of  More than  2 midnights  Justification for Hospital Stay: Urosepsis       Admission Orders:  Scheduled Meds:   Current Facility-Administered Medications:  acetaminophen 975 mg Oral TID   ALPRAZolam 0 5 mg Oral HS PRN   amLODIPine 5 mg Oral Daily   And valsartan 320 mg Oral Daily   cefTRIAXone 1,000 mg Intravenous Q24H   gabapentin 200 mg Oral HS   heparin (porcine) 5,000 Units Subcutaneous Q8H [facility] & half-way   oxyCODONE 5 mg Oral Q6H PRN   sertraline 50 mg Oral Daily   sodium chloride 2 g Oral TID With Meals     Continuous Infusions:  0 9% NSS at 50 ml/hr     PRN Meds: ALPRAZolam    oxyCODONE    SCDs  Neuro checks q 4 h   Daily wt  Skin care 3 x day   BMP q 8 hr   Diet regular  Cons Wound Care  PT eval/tx   ________________________  Wound Care Consult for assessment of pressure injuries POA  Orders written  Thank you,  [facility]  Utilization Review Department  Phone: [phone]; Fax [phone]  ATTENTION: Please call with any questions or concerns to [phone]  and carefully follow the prompts so that you are directed to the right person  Send all requests for admission clinical reviews, approved or denied determinations and any other requests to fax [phone]   All voicemails are confidential

## 2018-09-05 NOTE — PHYSICAL THERAPY NOTE
PT Evaluation (25min)  (8:00-8:25)    Past Medical History:   Diagnosis Date    Hypertension     Psychiatric disorder     anxiety        09/05/18 0825   Note Type   Note type Eval only   Pain Assessment   Pain Assessment No/denies pain   Home Living   Type of [address] One level;Stairs to enter with rails   Bathroom Equipment Grab bars in shower;Commode   [address]; Wheelchair-manual   Prior Function   Level of [place] Needs assistance with ADLs and functional mobility  (requires (A) for SPT <> w/c)   Lives With Son  ((-) alone)   Receives Help From Family   ADL Assistance Needs assistance   IADLs Needs assistance   Falls in the last 6 months 1 to 4  (early aug 2018 resulting in R hip fx s/p ORIF)   Vocational Retired   Restrictions/Precautions   [place] Bearing Precautions Per Order Yes   RLE [place] Bearing Per Order NWB  (per previous admission aug 2018)   Braces or Orthoses LE Braces  (R LE hinged knee brace locked in ext per previous admission)   Other Precautions Cognitive; Bed Alarm;Multiple lines; Fall Risk;Pain;WBS  (NWB R LE c hinged knee brace locked in ext)   General   Additional Pertinent History pt presents to [facility] c increased confusion + foul smelling urine  dx: UTI  recently admitted to \[facility]\"" early aug 2* R femur fx s/p ORIF + later d/c to [facility]  d/c home from [facility] 9/3 + was residing c son  NWB R LE c hinged knee brace per previous admission  PT consulted for mobility + d/c planning  Family/Caregiver Present No   Cognition   Orientation Level Oriented to person;Oriented to situation   RUE Assessment   RUE Assessment WFL   LUE Assessment   LUE Assessment WFL   RLE Assessment   RLE Assessment X  (hip flex 2-/5, ankle DF/PF 2-/5)   LLE Assessment   LLE Assessment WFL  (4-/5)   Coordination   Sensation WFL   Bed Mobility   Supine to Sit 3  Moderate assistance   Additional items Assist x 2;HOB elevated; Increased time required;Verbal cues;LE management   Sit to Supine 3 Moderate assistance   Additional items Assist x 2;Verbal cues;LE management   Transfers   Sit to Stand 2  Maximal assistance   Additional items Assist x 2;Verbal cues; Increased time required  (pt non-compliant c NWB R LE)   Stand to Sit 2  Maximal assistance   Additional items Assist x 2;Verbal cues; Increased time required   Ambulation/Elevation   Gait pattern Not appropriate  (pt non-compliant c NWB R LE)   Balance   Static Sitting Fair +   Dynamic Sitting Fair +   Static Standing Poor -   Activity Tolerance   Activity Tolerance Patient limited by fatigue;Patient limited by pain   Nurse Made Aware [place]   Assessment   Prognosis Fair   Problem List Decreased strength;Decreased range of motion;Decreased endurance; Impaired balance;Decreased mobility; Decreased cognition;Decreased safety awareness;Decreased skin integrity;Orthopedic restrictions;Pain   Assessment pt is a 89y/o f who presents to [facility] c UTI  recently d/c from [facility] 9/3/18 following R femur fx s/p ORIF early aug 2018  pt remains NWB R LE c hinged knee brace locked in ext  PMH significant for R femur fx s/p ORIF, dysphagia, leukocytosis  PTA, pt utilizing w/c for functional mobility + requires (A) c all tasks  resides c son in ranch home  currently requires mod-max (A)x2 for functional mobility tasks 2* significant deficits in strength, ROM, balance, pain, NWB R LE, + activity tolerance noted in PT exam above  Barthel Index 30/100  attempted sit>stand transition, however pt unable to maintain NWB R LE at this time  may benefit from lateral transfer via slide board next session  will cont skilled PT to further maximize functional mobility + improve quality of life  upon d/c, recommend STR  PT eval of high complexity 2* unstable med status c pt requiring ongoing medical management 2* UTI  pt remains NWB R LE s/p R femur ORIF early aug  pt c increased confusion from baseline  presents c mobility deficits above requiring (A)x2 to complete mobility tasks  Barriers to Discharge Inaccessible home environment   Goals   Patient Goals "I want to walk"  STG Expiration Date 09/15/18   Short Term Goal #1 1  increase strength 1/2 grade to improve overall functional mobility, 2  perform bed mobility min (A)x1 to decrease caregiver burden, 3  perform transfers min (A)x1 c most appropriate method while maintaining NWB R % of the time, 4  propel w/c 150' across level surface c (S) to increase (I) c mobility   Plan   Treatment/Interventions Functional transfer training;LE strengthening/ROM; Therapeutic exercise; Endurance training;Patient/family training;Equipment eval/education; Bed mobility;Spoke to nursing   PT Frequency Other (Comment)  (3-5x/wk)   Recommendation   Recommendation Short-term skilled PT   PT - OK to Discharge Yes  (to STR when stable )   Barthel Index   Feeding 5   Bathing 0   Grooming Score 0   Dressing Score 5   Bladder Score 0   Bowels Score 10   Toilet Use Score 5   Transfers (Bed/Chair) Score 5   Mobility (Level Surface) Score 0   Stairs Score 0   Barthel Index Score 30     Glenn Galvez, PT

## 2018-09-05 NOTE — PROGRESS NOTES
Progress Note - José Miguel Hernandez 1928, 80 y o  female MRN: [id number]    Unit/Bed#: -01 Encounter: [id number]    Primary Care Provider: Lucile Meckel, CRNP   Date and time admitted to hospital: 2018  3:20 PM        UTI (urinary tract infection)   Assessment & Plan    As above rocephin  IVF  Will follow up cultures  Sepsis ([address] )   Assessment & Plan    Resolved/resolving  Hyponatremia   Assessment & Plan    Na is back to baseline  Will DC IVF and encourage oral intake  * Encephalopathy   Assessment & Plan    Likely 2/2 UTI  Will treat and follow up CT brain  Much improved today, answering questions appropriately  VTE Pharmacologic Prophylaxis:   Pharmacologic: Heparin  Mechanical VTE Prophylaxis in Place: Yes    Patient Centered Rounds: I have performed bedside rounds with nursing staff today  Discussions with Specialists or Other Care Team Provider: not discussed with specialist      Education and Discussions with Family / Patient: discussed with patient and family at bedside  All their questions were answered  Time Spent for Care: 30 minutes  More than 50% of total time spent on counseling and coordination of care as described above  Current Length of Stay: 1 day(s)    Current Patient Status: Inpatient   Certification Statement: The patient will continue to require additional inpatient hospital stay due to IV ABx  Discharge Plan: Likely 24-48 hours  Code Status: Level 1 - Full Code      Subjective:   Patient seen and examined  "Yes I feel better"  No new complaints  Objective:     Vitals:   Temp (24hrs), Av °F (36 7 °C), Min:97 9 °F (36 6 °C), Max:98 2 °F (36 8 °C)    HR:  [78-92] 80  Resp:  [16-19] 17  BP: (102-137)/(57-62) 123/61  SpO2:  [93 %-97 %] 97 %  Body mass index is 20 43 kg/m²  Input and Output Summary (last 24 hours):        Intake/Output Summary (Last 24 hours) at 18 1132  Last data filed at 09/05/18 1100   Gross per 24 hour   Intake              350 ml   Output              915 ml   Net             -565 ml       Physical Exam:     Physical Exam   Constitutional: No distress  Elderly frail  HENT:   Head: Normocephalic  Mouth/Throat: No oropharyngeal exudate  Eyes: Right eye exhibits no discharge  Left eye exhibits no discharge  No scleral icterus  Neck: Normal range of motion  No JVD present  No tracheal deviation present  No thyromegaly present  Cardiovascular: Normal rate  Exam reveals no gallop and no friction rub  No murmur heard  Pulmonary/Chest: Effort normal  No respiratory distress  She has no wheezes  She has no rales  She exhibits no tenderness  Abdominal: Soft  She exhibits no distension and no mass  There is no tenderness  There is no rebound and no guarding  Musculoskeletal: She exhibits no edema, tenderness or deformity  Lymphadenopathy:     She has no cervical adenopathy  Neurological: She is alert  No cranial nerve deficit  Coordination normal    AAOx2    Skin: Skin is warm  No rash noted  She is not diaphoretic  No erythema  There is pallor  Psychiatric: She has a normal mood and affect  Additional Data:     Labs:      Results from last 7 days  Lab Units 09/05/18  0316 09/04/18  1636   WBC Thousand/uL 11 74* 12 58*   HEMOGLOBIN g/dL 9 6* 10 2*   HEMATOCRIT % 28 9* 30 0*   PLATELETS Thousands/uL 321 350   NEUTROS PCT %  --  77*   LYMPHS PCT %  --  16   MONOS PCT %  --  6   EOS PCT %  --  1       Results from last 7 days  Lab Units 09/05/18  1026 09/05/18  0316   SODIUM mmol/L 131* 133*   POTASSIUM mmol/L 3 4* 3 3*   CHLORIDE mmol/L 93* 97*   CO2 mmol/L 30 30   BUN mg/dL 5 6   CREATININE mg/dL 0 41* 0 37*   CALCIUM mg/dL 8 9 8 4   ALK PHOS U/L  --  77   ALT U/L  --  13   AST U/L  --  16                     * I Have Reviewed All Lab Data Listed Above  * Additional Pertinent Lab Tests Reviewed:  [address] Admission Reviewed    Imaging:    Imaging Reports Reviewed Today Include:   CT brain -   "Microangiopathic changes within the brain parenchyma   No mass, hemorrhage or acute territorial infarction  Mild diffuse cerebral volume loss "  Imaging Personally Reviewed by Myself Includes:  As above  Recent Cultures (last 7 days):           Last 24 Hours Medication List:     Current Facility-Administered Medications:  acetaminophen 975 mg Oral TID William Isaac MD   ALPRAZolam 0 5 mg Oral HS PRN William Isaac MD   amLODIPine 5 mg Oral Daily William Isaac MD   And       valsartan 320 mg Oral Daily William Isaac MD   cefTRIAXone 1,000 mg Intravenous Q24H William Isaac MD   gabapentin 200 mg Oral HS William Isaac MD   heparin (porcine) 5,000 Units Subcutaneous [facility] William Isaac MD   oxyCODONE 5 mg Oral Q6H PRN William Isaac MD   sertraline 50 mg Oral Daily William Isaac MD   sodium chloride 2 g Oral TID With Meals William Isaac MD        Today, Patient Was Seen By: William Isaac MD    ** Please Note: Dictation voice to text software may have been used in the creation of this document   **

## 2018-09-06 PROBLEM — E87.6 HYPOKALEMIA: Status: ACTIVE | Noted: 2018-09-06

## 2018-09-06 LAB
ANION GAP SERPL CALCULATED.3IONS-SCNC: 6 MMOL/L (ref 4–13)
ANION GAP SERPL CALCULATED.3IONS-SCNC: 7 MMOL/L (ref 4–13)
ANION GAP SERPL CALCULATED.3IONS-SCNC: 9 MMOL/L (ref 4–13)
BACTERIA UR CULT: ABNORMAL
BASOPHILS # BLD AUTO: 0.04 THOUSANDS/ΜL (ref 0–0.1)
BASOPHILS NFR BLD AUTO: 0 % (ref 0–1)
BUN SERPL-MCNC: 3 MG/DL (ref 5–25)
CALCIUM SERPL-MCNC: 8.7 MG/DL (ref 8.3–10.1)
CHLORIDE SERPL-SCNC: 92 MMOL/L (ref 100–108)
CHLORIDE SERPL-SCNC: 92 MMOL/L (ref 100–108)
CHLORIDE SERPL-SCNC: 95 MMOL/L (ref 100–108)
CO2 SERPL-SCNC: 27 MMOL/L (ref 21–32)
CO2 SERPL-SCNC: 29 MMOL/L (ref 21–32)
CO2 SERPL-SCNC: 30 MMOL/L (ref 21–32)
CREAT SERPL-MCNC: 0.34 MG/DL (ref 0.6–1.3)
CREAT SERPL-MCNC: 0.38 MG/DL (ref 0.6–1.3)
CREAT SERPL-MCNC: 0.48 MG/DL (ref 0.6–1.3)
EOSINOPHIL # BLD AUTO: 0.19 THOUSAND/ΜL (ref 0–0.61)
EOSINOPHIL NFR BLD AUTO: 2 % (ref 0–6)
ERYTHROCYTE [DISTWIDTH] IN BLOOD BY AUTOMATED COUNT: 13.2 % (ref 11.6–15.1)
GFR SERPL CREATININE-BSD FRML MDRD: 87 ML/MIN/1.73SQ M
GFR SERPL CREATININE-BSD FRML MDRD: 94 ML/MIN/1.73SQ M
GFR SERPL CREATININE-BSD FRML MDRD: 97 ML/MIN/1.73SQ M
GLUCOSE SERPL-MCNC: 140 MG/DL (ref 65–140)
GLUCOSE SERPL-MCNC: 164 MG/DL (ref 65–140)
GLUCOSE SERPL-MCNC: 88 MG/DL (ref 65–140)
HCT VFR BLD AUTO: 30.6 % (ref 34.8–46.1)
HGB BLD-MCNC: 10.4 G/DL (ref 11.5–15.4)
IMM GRANULOCYTES # BLD AUTO: 0.04 THOUSAND/UL (ref 0–0.2)
IMM GRANULOCYTES NFR BLD AUTO: 0 % (ref 0–2)
LYMPHOCYTES # BLD AUTO: 2.82 THOUSANDS/ΜL (ref 0.6–4.47)
LYMPHOCYTES NFR BLD AUTO: 25 % (ref 14–44)
MCH RBC QN AUTO: 29 PG (ref 26.8–34.3)
MCHC RBC AUTO-ENTMCNC: 34 G/DL (ref 31.4–37.4)
MCV RBC AUTO: 85 FL (ref 82–98)
MONOCYTES # BLD AUTO: 0.81 THOUSAND/ΜL (ref 0.17–1.22)
MONOCYTES NFR BLD AUTO: 7 % (ref 4–12)
NEUTROPHILS # BLD AUTO: 7.34 THOUSANDS/ΜL (ref 1.85–7.62)
NEUTS SEG NFR BLD AUTO: 66 % (ref 43–75)
NRBC BLD AUTO-RTO: 0 /100 WBCS
PLATELET # BLD AUTO: 355 THOUSANDS/UL (ref 149–390)
PMV BLD AUTO: 8.9 FL (ref 8.9–12.7)
POTASSIUM SERPL-SCNC: 3 MMOL/L (ref 3.5–5.3)
POTASSIUM SERPL-SCNC: 3 MMOL/L (ref 3.5–5.3)
POTASSIUM SERPL-SCNC: 4.1 MMOL/L (ref 3.5–5.3)
RBC # BLD AUTO: 3.59 MILLION/UL (ref 3.81–5.12)
SODIUM SERPL-SCNC: 128 MMOL/L (ref 136–145)
SODIUM SERPL-SCNC: 128 MMOL/L (ref 136–145)
SODIUM SERPL-SCNC: 131 MMOL/L (ref 136–145)
WBC # BLD AUTO: 11.24 THOUSAND/UL (ref 4.31–10.16)

## 2018-09-06 PROCEDURE — 99232 SBSQ HOSP IP/OBS MODERATE 35: CPT | Performed by: FAMILY MEDICINE

## 2018-09-06 PROCEDURE — 85025 COMPLETE CBC W/AUTO DIFF WBC: CPT | Performed by: INTERNAL MEDICINE

## 2018-09-06 PROCEDURE — 80048 BASIC METABOLIC PNL TOTAL CA: CPT | Performed by: INTERNAL MEDICINE

## 2018-09-06 RX ADMIN — HEPARIN SODIUM 5000 UNITS: 5000 INJECTION, SOLUTION INTRAVENOUS; SUBCUTANEOUS at 06:22

## 2018-09-06 RX ADMIN — ACETAMINOPHEN 975 MG: 325 TABLET, FILM COATED ORAL at 21:53

## 2018-09-06 RX ADMIN — HEPARIN SODIUM 5000 UNITS: 5000 INJECTION, SOLUTION INTRAVENOUS; SUBCUTANEOUS at 13:52

## 2018-09-06 RX ADMIN — SODIUM CHLORIDE TAB 1 GM 2 G: 1 TAB at 12:05

## 2018-09-06 RX ADMIN — Medication 20 MEQ: at 14:13

## 2018-09-06 RX ADMIN — ACETAMINOPHEN 975 MG: 325 TABLET, FILM COATED ORAL at 15:56

## 2018-09-06 RX ADMIN — VALSARTAN 320 MG: 160 TABLET, FILM COATED ORAL at 08:37

## 2018-09-06 RX ADMIN — SERTRALINE HYDROCHLORIDE 50 MG: 50 TABLET ORAL at 08:36

## 2018-09-06 RX ADMIN — ACETAMINOPHEN 975 MG: 325 TABLET, FILM COATED ORAL at 08:36

## 2018-09-06 RX ADMIN — AMLODIPINE BESYLATE 5 MG: 5 TABLET ORAL at 08:36

## 2018-09-06 RX ADMIN — CEFTRIAXONE 1000 MG: 1 INJECTION, POWDER, FOR SOLUTION INTRAMUSCULAR; INTRAVENOUS at 17:50

## 2018-09-06 RX ADMIN — GABAPENTIN 200 MG: 100 CAPSULE ORAL at 21:52

## 2018-09-06 RX ADMIN — Medication 20 MEQ: at 15:49

## 2018-09-06 RX ADMIN — SODIUM CHLORIDE TAB 1 GM 2 G: 1 TAB at 08:35

## 2018-09-06 RX ADMIN — SODIUM CHLORIDE TAB 1 GM 2 G: 1 TAB at 15:56

## 2018-09-06 RX ADMIN — HEPARIN SODIUM 5000 UNITS: 5000 INJECTION, SOLUTION INTRAVENOUS; SUBCUTANEOUS at 21:59

## 2018-09-06 NOTE — ASSESSMENT & PLAN NOTE
Likely 2/2 UTI  CT brain  Microangiopathic changes within the brain parenchyma  No mass, hemorrhage or acute territorial infarction  Mild diffuse cerebral volume loss  answering questions appropriately

## 2018-09-06 NOTE — SOCIAL WORK
Met with pt at bedside  Pt alert  However, confused  CM observed brace on pt's right leg  Pt reported that she lives at home with her son  She reported that she fell and broke her leg  She stated that she uses a wheelchair  She stated that she is unsure if she has VNA  She stated that she is unsure what pharmacy she uses  She stated that she is unsure how she is transported to doctor appointments  CM met with pt's son, Sena Mcgrath and his wife Antonette Mcgrath stated that pt lives in a ranch with him  He stated that pt fell while on her way to PCP appointment and is now in a wheelchair  He asked if pt can receive a new PCP and preferably a male  CM informed Jose that CM will provide him with [employer] card and a few PCP options  He reported that pt was recently discharged from [address] on 8/27/18  Sean Mcgrath stated that pt has an ortho appointment on Wednesday 9/12/18 but was wondering if ortho can see pt in the hospital  CM informed him that CM will discuss with physician  [facility] that [facility] that pt will need SN, OT, PT and aide  CM informed Antonette Hernandez that CM will also put in referral through [facility] regarding aide  Sean Mcgrath stated that pt uses [address] (p) [phone]  Sean Mcgrath reported that he is POA  He stated that pt has been being transported by EMS since breaking leg  During this time, CM discussed need for continued STR  CM provided pt's son with SNF list  He stated that if pt goes back to SNF, pt and famiyl not opposed to [address]  However, pt and family stated that they are leaning more towards pt going home with [facility] at this time         CM reviewed discharge planning process including the following: identifying help at home, patient preference for discharge planning needs, pharmacy preference, and availability of treatment team to discuss questions or concerns patient and/or family may have regarding understanding medications and recognizing signs and symptoms once discharged  CM also encouraged patient to follow up with all recommended appointments after discharge  Patient advised of importance for patient and family to participate in managing patients medical well being  CM name and role reviewed  Discharge Checklist reviewed and CM will continue to monitor for progress toward discharge goals in nursing and provider rounds

## 2018-09-06 NOTE — PLAN OF CARE
Problem: DISCHARGE PLANNING - CARE MANAGEMENT  Goal: Discharge to post-acute care or home with appropriate resources  INTERVENTIONS:  - Conduct assessment to determine patient/family and health care team treatment goals, and need for post-acute services based on payer coverage, community resources, and patient preferences, and barriers to discharge  - Address psychosocial, clinical, and financial barriers to discharge as identified in assessment in conjunction with the patient/family and health care team  - Arrange appropriate level of post-acute services according to patients   needs and preference and payer coverage in collaboration with the physician and health care team  - Communicate with and update the patient/family, physician, and health care team regarding progress on the discharge plan  - Arrange appropriate transportation to post-acute venues   Outcome: Progressing  CM placed [employer] card along with PCP list close to pt's address in pt's room for family  CM contacted Aging office and left  for referral of waiver program regarding aide  CM sent referral to [facility] for SN, OT, PT and aide assessment  Pt expected to see Outpatient Ortho  Appointment still scheduled on 9/12/18

## 2018-09-06 NOTE — PLAN OF CARE
CARDIOVASCULAR - ADULT     Maintains optimal cardiac output and hemodynamic stability Progressing     Absence of cardiac dysrhythmias or at baseline rhythm Progressing        DISCHARGE PLANNING     Discharge to home or other facility with appropriate resources Progressing        INFECTION - ADULT     Absence or prevention of progression during hospitalization Progressing        Knowledge Deficit     Patient/family/caregiver demonstrates understanding of disease process, treatment plan, medications, and discharge instructions Progressing        MUSCULOSKELETAL - ADULT     Maintain or return mobility to safest level of function Progressing     Maintain proper alignment of affected body part Progressing        Nutrition/Hydration-ADULT     Nutrient/Hydration intake appropriate for improving, restoring or maintaining nutritional needs Progressing        PAIN - ADULT     Verbalizes/displays adequate comfort level or baseline comfort level Progressing        Potential for Falls     Patient will remain free of falls Progressing        Prexisting or High Potential for Compromised Skin Integrity     Skin integrity is maintained or improved Progressing        SAFETY ADULT     Maintain or return to baseline ADL function Progressing     Maintain or return mobility status to optimal level Progressing        SKIN/TISSUE INTEGRITY - ADULT     Skin integrity remains intact Progressing     Incision(s), wounds(s) or drain site(s) healing without S/S of infection Progressing     Oral mucous membranes remain intact Progressing

## 2018-09-06 NOTE — PROGRESS NOTES
Progress Note - Padmaja May 1928, 80 y o  female MRN: [id number]    Unit/Bed#: -01 Encounter: [id number]    Primary Care Provider: ROMERO Rodas   Date and time admitted to hospital: 2018  3:20 PM        * Encephalopathy   Assessment & Plan    Likely 2/2 UTI  CT brain  Microangiopathic changes within the brain parenchyma  No mass, hemorrhage or acute territorial infarction  Mild diffuse cerebral volume loss  answering questions appropriately  UTI (urinary tract infection)   Assessment & Plan    Continue Rocephin  Follow up cultures  Sepsis ([address] )   Assessment & Plan    Resolved/resolving  Hyponatremia   Assessment & Plan    Na is back to baseline  encourage oral intake  VTE Pharmacologic Prophylaxis:   Pharmacologic: Heparin  Mechanical VTE Prophylaxis in Place: No    Patient Centered Rounds: I have performed bedside rounds with nursing staff today  Discussions with Specialists or Other Care Team Provider:     Education and Discussions with Family / Patient: patient and family member at bed side    Time Spent for Care: 20 minutes  More than 50% of total time spent on counseling and coordination of care as described above  Current Length of Stay: 2 day(s)    Current Patient Status: Inpatient   Certification Statement: The patient will continue to require additional inpatient hospital stay due to acute above conditions    Discharge Plan: depend on clinical course    Code Status: Level 1 - Full Code      Subjective:   Appear to be at baseline per family member, awake, reported she is fine    Objective:     Vitals:   Temp (24hrs), Av 2 °F (36 8 °C), Min:97 9 °F (36 6 °C), Max:98 5 °F (36 9 °C)    HR:  [77-84] 81  Resp:  [17-18] 18  BP: (111-129)/(55-60) 123/60  SpO2:  [93 %-98 %] 98 %  Body mass index is 20 25 kg/m²  Input and Output Summary (last 24 hours):        Intake/Output Summary (Last 24 hours) at 18 1318  Last data filed at 09/06/18 1026   Gross per 24 hour   Intake              997 ml   Output             2747 ml   Net            -1750 ml       Physical Exam:     Physical Exam   Constitutional: No distress  Neck: No JVD present  No tracheal deviation present  No thyromegaly present  Cardiovascular: Normal rate, regular rhythm, normal heart sounds and intact distal pulses  Exam reveals no gallop  No murmur heard  Pulmonary/Chest: No respiratory distress  She has no wheezes  She has no rales  She exhibits no tenderness  Abdominal: She exhibits no distension and no mass  There is no tenderness  There is no rebound and no guarding  Musculoskeletal: She exhibits tenderness  Lymphadenopathy:     She has no cervical adenopathy  Neurological: She is alert  Coordination abnormal    No oriented x3   Skin: She is not diaphoretic  Additional Data:     Labs:      Results from last 7 days  Lab Units 09/06/18  0324   WBC Thousand/uL 11 24*   HEMOGLOBIN g/dL 10 4*   HEMATOCRIT % 30 6*   PLATELETS Thousands/uL 355   NEUTROS PCT % 66   LYMPHS PCT % 25   MONOS PCT % 7   EOS PCT % 2       Results from last 7 days  Lab Units 09/06/18  1040  09/05/18  0316   SODIUM mmol/L 128*  < > 133*   POTASSIUM mmol/L 3 0*  < > 3 3*   CHLORIDE mmol/L 92*  < > 97*   CO2 mmol/L 27  < > 30   BUN mg/dL 3*  < > 6   CREATININE mg/dL 0 48*  < > 0 37*   CALCIUM mg/dL 8 7  < > 8 4   ALK PHOS U/L  --   --  77   ALT U/L  --   --  13   AST U/L  --   --  16   < > = values in this interval not displayed  * I Have Reviewed All Lab Data Listed Above  * Additional Pertinent Lab Tests Reviewed: All Labs Within Last 24 Hours Reviewed    Imaging:    Imaging Reports Reviewed Today Include:   Imaging Personally Reviewed by Myself Includes:      Recent Cultures (last 7 days):       Results from last 7 days  Lab Units 09/04/18  1732 09/04/18  1638 09/04/18  1636   BLOOD CULTURE   --  No Growth at 24 hrs  No Growth at 24 hrs     URINE CULTURE  Culture results to follow  --   --        Last 24 Hours Medication List:     Current Facility-Administered Medications:  acetaminophen 975 mg Oral TID Jorje Craft MD    ALPRAZolam 0 5 mg Oral HS PRN Jorje Craft MD    amLODIPine 5 mg Oral Daily Jorje Craft MD    And        valsartan 320 mg Oral Daily Jorje Craft MD    cefTRIAXone 1,000 mg Intravenous Q24H Jorje Craft MD Last Rate: 1,000 mg (09/05/18 1259)   gabapentin 200 mg Oral HS Jorje Craft MD    heparin (porcine) 5,000 Units Subcutaneous [facility] Jorje Craft MD    oxyCODONE 5 mg Oral Q6H PRN Jorje Craft MD    sertraline 50 mg Oral Daily Jorje Craft MD    sodium chloride 2 g Oral TID With Meals Jorje Craft MD         Today, Patient Was Seen By: Chris Purcell MD    ** Please Note: Dragon 360 Dictation voice to text software may have been used in the creation of this document   **

## 2018-09-07 VITALS
HEIGHT: 64 IN | SYSTOLIC BLOOD PRESSURE: 116 MMHG | BODY MASS INDEX: 20.32 KG/M2 | TEMPERATURE: 98.6 F | WEIGHT: 119.05 LBS | RESPIRATION RATE: 18 BRPM | DIASTOLIC BLOOD PRESSURE: 87 MMHG | HEART RATE: 92 BPM | OXYGEN SATURATION: 95 %

## 2018-09-07 LAB
ANION GAP SERPL CALCULATED.3IONS-SCNC: 6 MMOL/L (ref 4–13)
ANION GAP SERPL CALCULATED.3IONS-SCNC: 7 MMOL/L (ref 4–13)
BUN SERPL-MCNC: 4 MG/DL (ref 5–25)
BUN SERPL-MCNC: 8 MG/DL (ref 5–25)
CALCIUM SERPL-MCNC: 8.7 MG/DL (ref 8.3–10.1)
CALCIUM SERPL-MCNC: 9 MG/DL (ref 8.3–10.1)
CHLORIDE SERPL-SCNC: 92 MMOL/L (ref 100–108)
CHLORIDE SERPL-SCNC: 93 MMOL/L (ref 100–108)
CO2 SERPL-SCNC: 29 MMOL/L (ref 21–32)
CO2 SERPL-SCNC: 31 MMOL/L (ref 21–32)
CREAT SERPL-MCNC: 0.3 MG/DL (ref 0.6–1.3)
CREAT SERPL-MCNC: 0.35 MG/DL (ref 0.6–1.3)
GFR SERPL CREATININE-BSD FRML MDRD: 101 ML/MIN/1.73SQ M
GFR SERPL CREATININE-BSD FRML MDRD: 96 ML/MIN/1.73SQ M
GLUCOSE SERPL-MCNC: 87 MG/DL (ref 65–140)
GLUCOSE SERPL-MCNC: 99 MG/DL (ref 65–140)
POTASSIUM SERPL-SCNC: 3.3 MMOL/L (ref 3.5–5.3)
POTASSIUM SERPL-SCNC: 3.4 MMOL/L (ref 3.5–5.3)
SODIUM SERPL-SCNC: 128 MMOL/L (ref 136–145)
SODIUM SERPL-SCNC: 130 MMOL/L (ref 136–145)

## 2018-09-07 PROCEDURE — 80048 BASIC METABOLIC PNL TOTAL CA: CPT | Performed by: FAMILY MEDICINE

## 2018-09-07 PROCEDURE — 99238 HOSP IP/OBS DSCHRG MGMT 30/<: CPT | Performed by: FAMILY MEDICINE

## 2018-09-07 RX ORDER — CEPHALEXIN 500 MG/1
500 CAPSULE ORAL EVERY 6 HOURS SCHEDULED
Qty: 14 CAPSULE | Refills: 0 | Status: SHIPPED | OUTPATIENT
Start: 2018-09-07 | End: 2018-09-07

## 2018-09-07 RX ORDER — CEPHALEXIN 500 MG/1
500 CAPSULE ORAL EVERY 12 HOURS SCHEDULED
Qty: 14 CAPSULE | Refills: 0 | Status: SHIPPED | OUTPATIENT
Start: 2018-09-07 | End: 2018-09-14

## 2018-09-07 RX ORDER — POTASSIUM CHLORIDE 20 MEQ/1
20 TABLET, EXTENDED RELEASE ORAL ONCE
Status: COMPLETED | OUTPATIENT
Start: 2018-09-07 | End: 2018-09-07

## 2018-09-07 RX ADMIN — HEPARIN SODIUM 5000 UNITS: 5000 INJECTION, SOLUTION INTRAVENOUS; SUBCUTANEOUS at 15:00

## 2018-09-07 RX ADMIN — SERTRALINE HYDROCHLORIDE 50 MG: 50 TABLET ORAL at 09:00

## 2018-09-07 RX ADMIN — ACETAMINOPHEN 975 MG: 325 TABLET, FILM COATED ORAL at 17:57

## 2018-09-07 RX ADMIN — SODIUM CHLORIDE TAB 1 GM 2 G: 1 TAB at 12:56

## 2018-09-07 RX ADMIN — SODIUM CHLORIDE TAB 1 GM 2 G: 1 TAB at 07:00

## 2018-09-07 RX ADMIN — POTASSIUM CHLORIDE 20 MEQ: 1500 TABLET, EXTENDED RELEASE ORAL at 11:30

## 2018-09-07 RX ADMIN — AMLODIPINE BESYLATE 5 MG: 5 TABLET ORAL at 09:00

## 2018-09-07 RX ADMIN — ACETAMINOPHEN 975 MG: 325 TABLET, FILM COATED ORAL at 09:00

## 2018-09-07 RX ADMIN — VALSARTAN 320 MG: 160 TABLET, FILM COATED ORAL at 09:00

## 2018-09-07 RX ADMIN — HEPARIN SODIUM 5000 UNITS: 5000 INJECTION, SOLUTION INTRAVENOUS; SUBCUTANEOUS at 05:21

## 2018-09-07 RX ADMIN — SODIUM CHLORIDE TAB 1 GM 2 G: 1 TAB at 17:56

## 2018-09-07 NOTE — PLAN OF CARE
CARDIOVASCULAR - ADULT     Maintains optimal cardiac output and hemodynamic stability Adequate for Discharge     Absence of cardiac dysrhythmias or at baseline rhythm Adequate for Discharge        DISCHARGE PLANNING     Discharge to home or other facility with appropriate resources Adequate for Discharge        DISCHARGE PLANNING - CARE MANAGEMENT     Discharge to post-acute care or home with appropriate resources Adequate for Discharge        INFECTION - ADULT     Absence or prevention of progression during hospitalization Adequate for Discharge        Knowledge Deficit     Patient/family/caregiver demonstrates understanding of disease process, treatment plan, medications, and discharge instructions Adequate for Discharge        MUSCULOSKELETAL - ADULT     Maintain or return mobility to safest level of function Adequate for Discharge     Maintain proper alignment of affected body part Adequate for Discharge        Nutrition/Hydration-ADULT     Nutrient/Hydration intake appropriate for improving, restoring or maintaining nutritional needs Adequate for Discharge        PAIN - ADULT     Verbalizes/displays adequate comfort level or baseline comfort level Adequate for Discharge        Potential for Falls     Patient will remain free of falls Adequate for Discharge        Prexisting or High Potential for Compromised Skin Integrity     Skin integrity is maintained or improved Adequate for Discharge        SAFETY ADULT     Maintain or return to baseline ADL function Adequate for Discharge     Maintain or return mobility status to optimal level Adequate for Discharge        SKIN/TISSUE INTEGRITY - ADULT     Skin integrity remains intact Adequate for Discharge     Incision(s), wounds(s) or drain site(s) healing without S/S of infection Adequate for Discharge     Oral mucous membranes remain intact Adequate for Discharge

## 2018-09-07 NOTE — DISCHARGE SUMMARY
Discharge- José Miguel Hernandez 7/20/1928, 80 y o  female MRN: [id number]    Unit/Bed#: -01 Encounter: [id number]    Primary Care Provider: Lucile Meckel, CRNP   Date and time admitted to hospital: 9/4/2018  3:20 PM        * Encephalopathy   Assessment & Plan    Improved  Likely 2/2 UTI  CT brain  Microangiopathic changes within the brain parenchyma  No mass, hemorrhage or acute territorial infarction  Mild diffuse cerebral volume loss  answering questions appropriately  UTI (urinary tract infection)   Assessment & Plan    Discontinue Rocephin  Discharge on Keflex          Hypokalemia   Assessment & Plan    Improved after replacement         Sepsis ([address] )   Assessment & Plan    improved        Hyponatremia   Assessment & Plan    Na is back to baseline  encourage oral intake  Disposition:     Home      Consultations During Hospital Stay:  ·     Procedures Performed:     ·     Significant Findings / Test Results:     ·     Incidental Findings:     Test Results Pending at Discharge (will require follow up):   ·      Outpatient Tests Requested:  ·     Complications:      Hospital Course:     José Miguel Hernandez is a 80 y o  female patient who originally presented to the hospital on 9/4/2018 due to acute encephalopathy which he was most likely secondary to acute urinary tract infection reason why patient was started on IV antibiotic  Patient have recently discharged from rehab after she had a hip fracture  She has previous history of dementia  During hospital stay patient has been clinically stable for the last 24 hours with well improvement    Patient will be discharged on by mouth antibiotic    Condition at Discharge: stable     Discharge Day Visit / Exam:     Subjective:  I am good, patient found to be oriented x2 today, eating comfortable   Vitals: Blood Pressure: 114/62 (09/07/18 1100)  Pulse: 92 (09/07/18 1100)  Temperature: 98 6 °F (37 °C) (09/07/18 1100)  Temp Source: Oral (09/07/18 1100)  Respirations: 18 (09/07/18 1100)  Height: 5' 4" (162 6 cm) (09/05/18 1110)  Weight - Scale: 54 kg (119 lb 0 8 oz) (09/07/18 0600)  SpO2: 95 % (09/07/18 1100)  Exam:   Physical Exam   Constitutional: No distress  HENT:   Head: Normocephalic and atraumatic  Right Ear: External ear normal    Left Ear: External ear normal    Neck: No JVD present  No tracheal deviation present  No thyromegaly present  Cardiovascular: Normal rate, normal heart sounds and intact distal pulses  Exam reveals no gallop  No murmur heard  Pulmonary/Chest: Effort normal and breath sounds normal  No respiratory distress  She has no wheezes  She has no rales  She exhibits no tenderness  Abdominal: Soft  Bowel sounds are normal  She exhibits no distension and no mass  There is no tenderness  There is no rebound and no guarding  Musculoskeletal: She exhibits deformity  Right leg cast   Lymphadenopathy:     She has no cervical adenopathy  Neurological: She is alert  Coordination abnormal    Oriented x2   Skin: Skin is warm  She is not diaphoretic  Psychiatric: She has a normal mood and affect  Discussion with Family: son and daughter at bed side    Discharge instructions/Information to patient and family:   See after visit summary for information provided to patient and family  Provisions for Follow-Up Care:  See after visit summary for information related to follow-up care and any pertinent home health orders  Planned Readmission:      Discharge Statement:  I spent 30 minutes discharging the patient  This time was spent on the day of discharge  I had direct contact with the patient on the day of discharge  Greater than 50% of the total time was spent examining patient, answering all patient questions, arranging and discussing plan of care with patient as well as directly providing post-discharge instructions  Additional time then spent on discharge activities      Discharge Medications:  See after visit summary for reconciled discharge medications provided to patient and family        ** Please Note: This note has been constructed using a voice recognition system **

## 2018-09-07 NOTE — ASSESSMENT & PLAN NOTE
Improved  Likely 2/2 UTI  CT brain  Microangiopathic changes within the brain parenchyma  No mass, hemorrhage or acute territorial infarction  Mild diffuse cerebral volume loss  answering questions appropriately

## 2018-09-07 NOTE — NURSING NOTE
ROMERO Herline notified of low k from 4 1 to 3 3  No new orders at this time   Next BNP scheduled for 0800

## 2018-09-07 NOTE — PLAN OF CARE
Problem: DISCHARGE PLANNING  Goal: Discharge to home or other facility with appropriate resources  INTERVENTIONS:  - Identify barriers to discharge w/patient and caregiver  - Arrange for needed discharge resources and transportation as appropriate  - Identify discharge learning needs (meds, wound care, etc )  - Refer to Case Management Department for coordinating discharge planning if the patient needs post-hospital services based on physician/advanced practitioner order or complex needs related to functional status, cognitive ability, or social support system   Outcome: Progressing  SITA spoke with Moreno at [facility] to  set up wheelchair [place] transport for patient to go home  Patient will be leaving at 1pm  Patient , family, nurse and doctor are all made aware of the  time  At this point patient has no further needs

## 2018-09-07 NOTE — SOCIAL WORK
SITA spoke with Moreno at [facility] to  set up wheelchair [facility] transport for patient to go home  Patient will be leaving at 1pm  Patient , family, nurse and doctor are all made aware of the  time  At this point patient has no further needs

## 2018-09-09 LAB
BACTERIA BLD CULT: NORMAL
BACTERIA BLD CULT: NORMAL

## 2018-09-11 NOTE — CASE MANAGEMENT
Notification of Discharge  This is a Notification of Discharge from our facility [address]  Please be advised that this patient has been discharge from our facility  Below you will find the admission and discharge date and time including the patients disposition  PRESENTATION DATE: 9/4/2018  3:20 PM  IP ADMISSION DATE: 9/4/18 1816  DISCHARGE DATE: 9/7/2018  7:43 PM  DISPOSITION: Home with [address] [place] [address] in the [facility]  Phone: [phone]; Fax [phone]  ATTENTION: The Network Utilization Review Department is now centralized for our 9 Facilities  Make a note that we have a new phone and fax numbers for our Department  Please call with any questions or concerns to [phone] and carefully follow the prompts so that you are directed to the right person  All voicemails are confidential  Fax any determinations, approvals, denials, and requests for initial or continue stay review clinical to [phone]  Due to HIGH CALL volume, it would be easier if you could please send faxed requests to expedite your requests and in part, help us provide discharge notifications faster

## 2018-09-11 NOTE — CASE MANAGEMENT
Thank you,  [address]  Utilization Review Department  Phone: [phone]; Fax [phone]  ATTENTION: Please call with any questions or concerns to [phone]  and carefully follow the prompts so that you are directed to the right person  Send all requests for admission clinical reviews, approved or denied determinations and any other requests to fax [phone]  All voicemails are confidential      Continued Stay Review    Date: 9/5-9/7/18   Inpatient    Age/Sex: 80 y o  female initially admitted 9/4/18 due to sepsis/UTI    Assessment/Plan:   9/5: pale  Answering questions today  continue IV antbx, IVF-NS 50/hr, follow cultures, encourage oral intake  Encephalopathy likely due to UTI  PT recommending STR  Wound care evaluated: bid hydraguard to bilat heels; turn & repo q2h; elevate heels off bed w/pillows to offload pressure; soft care cushion to chair when oob; sacral care with calazime cream to open sacral slit with hydraguard ti bilat buttocks tid & prn  Assess skin around RLE brace q shift/prn  9/6: continue IV rocephin  Has abnomal coordination and musculoskeletal tenderness  Confused when  met with patient     9/7: ready for dc to home      PO tylenol, norvasc, diovan, neurontin, zoloft, nacl  IV rocephin q24h  SQ heparin q8h  IV kcl x 1 on 9/5, x 2 on 9/5  PO k x 1 on 9/7        Vital Signs:   09/05/18 0700  97 9 °F (36 6 °C)  80  17  123/61  88  97 %  None (Room air)     09/06/18 0700  98 3 °F (36 8 °C)  84  17  129/60  87  96 %  None      09/07/18 0700  98 °F (36 7 °C)  94  18  126/61  87  97 %  None      Abnormal Labs/Diagnostic Results:          9/5 0316 9/5 1026 9/5 2021 9/6 0324 9/6 1040 9/6 1756 9/7 0000 9/7 0746       133 131 128 131 128 128 128 130  Sodium     3 3 3 4 3 1 3 0 3 0 4 1 3 3 3 4  Potassium     97 93 92 95 92 92 93 92  Chloride     30 30 31 29 27 30 29 31  CO2     6 8 5 7 9 6 6 7  Anion Gap     6 5 4 3 3 3 8 4  BUN     0 37 0 41 0 45 0 34 0 48 0  38 0 35 0 30  Creatinine     89 102 163 88 140 164 87 99  Glucose     8 4 8 9 8 5 8 7 8 7 8 7 8 7 9 0  Calcium     16         AST          9/5: wbc 11 74   hgb 9 6  hct 28 9   9/6: wbc 11 24  hgb 10 4   hct 30 6  Urine Culture >100,000 cfu/ml Klebsiella pneumoniae            Discharge Plan: DC to home 9/7/18, lives with son and daughter in law  Requires wheelchair for mobility due to broken leg from recent fall  Requested new PCP, infolink contact given to discuss physician choices  Will need home health PT/OT/aide  Referral placed to Aging office

## 2018-09-12 ENCOUNTER — APPOINTMENT (OUTPATIENT)
Dept: RADIOLOGY | Facility: CLINIC | Age: 83
End: 2018-09-12
Payer: COMMERCIAL

## 2018-09-12 ENCOUNTER — OFFICE VISIT (OUTPATIENT)
Dept: OBGYN CLINIC | Facility: CLINIC | Age: 83
End: 2018-09-12
Payer: COMMERCIAL

## 2018-09-12 VITALS
HEART RATE: 85 BPM | DIASTOLIC BLOOD PRESSURE: 68 MMHG | HEIGHT: 64 IN | BODY MASS INDEX: 17.75 KG/M2 | WEIGHT: 104 LBS | SYSTOLIC BLOOD PRESSURE: 107 MMHG

## 2018-09-12 DIAGNOSIS — M25.561 ACUTE PAIN OF RIGHT KNEE: ICD-10-CM

## 2018-09-12 DIAGNOSIS — S72.401A CLOSED FRACTURE OF DISTAL END OF RIGHT FEMUR, UNSPECIFIED FRACTURE MORPHOLOGY, INITIAL ENCOUNTER (HCC): ICD-10-CM

## 2018-09-12 DIAGNOSIS — M25.561 ACUTE PAIN OF RIGHT KNEE: Primary | ICD-10-CM

## 2018-09-12 PROCEDURE — 99213 OFFICE O/P EST LOW 20 MIN: CPT | Performed by: ORTHOPAEDIC SURGERY

## 2018-09-12 PROCEDURE — 1111F DSCHRG MED/CURRENT MED MERGE: CPT | Performed by: ORTHOPAEDIC SURGERY

## 2018-09-12 PROCEDURE — 73560 X-RAY EXAM OF KNEE 1 OR 2: CPT

## 2018-09-12 NOTE — PROGRESS NOTES
80 y o female presents for continuing closed fracture care of R TKA periprosthetic knee fracture that was sustained on  ( she is about 6 wks post injury at this point)  She has been compliant with nonweight bearing status and has been wearing hinged knee brace to knee  She has discomfort with the brace but son states that overall he has noticed a significant improvement in her pain levels from initial injury level  She denies any motor or sensoyr deficits in the affected extremity      Review of Systems  Review of systems negative unless otherwise specified in HPI    Past Medical History  Past Medical History:   Diagnosis Date    Hypertension     Psychiatric disorder     anxiety       Past Surgical History  Past Surgical History:   Procedure Laterality Date     SECTION      CHOLECYSTECTOMY      REPLACEMENT TOTAL KNEE         Current Medications  Current Outpatient Prescriptions on File Prior to Visit   Medication Sig Dispense Refill    acetaminophen (TYLENOL) 325 mg tablet Take 3 tablets (975 mg total) by mouth 3 (three) times a day 30 tablet 0    amLODIPine-valsartan (EXFORGE) 5-320 MG per tablet Take 1 tablet by mouth daily      cephalexin (KEFLEX) 500 mg capsule Take 1 capsule (500 mg total) by mouth every 12 (twelve) hours for 7 days 14 capsule 0    gabapentin (NEURONTIN) 100 mg capsule Take 2 capsules (200 mg total) by mouth daily at bedtime 30 capsule 0    oxyCODONE (ROXICODONE) 5 mg immediate release tablet Take 1 tablet (5 mg total) by mouth every 6 (six) hours as needed for moderate pain or severe pain Max Daily Amount: 20 mg 20 tablet 0    polyethylene glycol (MIRALAX) 17 g packet Take 17 g by mouth daily 14 each 0    senna (SENOKOT) 8 6 mg Take 2 tablets (17 2 mg total) by mouth daily at bedtime 120 each 0    sertraline (ZOLOFT) 25 mg tablet Take 50 mg by mouth daily      sodium chloride 1 g tablet Take 2 tablets (2 g total) by mouth 3 (three) times a day with meals 30 tablet 0    ALPRAZolam (XANAX) 0 5 mg tablet Take 1 tablet (0 5 mg total) by mouth daily at bedtime as needed for anxiety for up to 10 days 10 tablet 0     No current facility-administered medications on file prior to visit  Recent Labs [facility])    0  Lab Value Date/Time   HCT 30 6 (L) 09/06/2018 0324   HCT 36 7 10/20/2015 0921   HGB 10 4 (L) 09/06/2018 0324   HGB 12 4 10/20/2015 0921   WBC 11 24 (H) 09/06/2018 0324   WBC 9 12 10/20/2015 0921   INR 1 02 08/02/2018 1111   ESR 20 10/20/2015 0921   GLUCOSE 87 10/20/2015 0921   HGBA1C 5 6 09/23/2014 0913         Physical exam  General: Awake, Alert, Oriented  HEENT: No scleral injection, no evidence of facial trauma  Heart: Extremities warm and well perfused  Lungs: No audible wheezing  ·   right Knee exam  · Knee brace in place  · Skin closed  · + ankle df/pf, ehl/fhl motor functions  · Extremity warm/well perfused    Procedure  None    Imaging  Closed distal femur fracture in satisfactory alignment, brace in place    Assessment plan:  51-year-old female status post fall with right total knee periprosthetic fracture be treated conservatively now 6 weeks into treatment course  Plan:   Nonweightbearing right lower extremity in a hinged knee brace  Oral analgesics as needed for pain  Follow-up in 3 weeks for repeat films of right knee and exam    Danica Preston  09/12/18

## 2018-09-18 ENCOUNTER — APPOINTMENT (EMERGENCY)
Dept: CT IMAGING | Facility: HOSPITAL | Age: 83
End: 2018-09-18
Payer: COMMERCIAL

## 2018-09-18 ENCOUNTER — HOSPITAL ENCOUNTER (EMERGENCY)
Facility: HOSPITAL | Age: 83
Discharge: HOME | End: 2018-09-18
Attending: EMERGENCY MEDICINE | Admitting: EMERGENCY MEDICINE
Payer: COMMERCIAL

## 2018-09-18 VITALS
SYSTOLIC BLOOD PRESSURE: 106 MMHG | OXYGEN SATURATION: 98 % | RESPIRATION RATE: 18 BRPM | HEART RATE: 84 BPM | BODY MASS INDEX: 18.43 KG/M2 | DIASTOLIC BLOOD PRESSURE: 58 MMHG | WEIGHT: 104 LBS | HEIGHT: 63 IN | TEMPERATURE: 99.1 F

## 2018-09-18 DIAGNOSIS — F03.90 DEMENTIA (HCC): ICD-10-CM

## 2018-09-18 DIAGNOSIS — E87.1 HYPONATREMIA: ICD-10-CM

## 2018-09-18 DIAGNOSIS — N39.0 UTI (URINARY TRACT INFECTION): Primary | ICD-10-CM

## 2018-09-18 DIAGNOSIS — E83.42 HYPOMAGNESEMIA: ICD-10-CM

## 2018-09-18 LAB
ALBUMIN SERPL BCP-MCNC: 3.1 G/DL (ref 3.5–5)
ALP SERPL-CCNC: 80 U/L (ref 46–116)
ALT SERPL W P-5'-P-CCNC: 15 U/L (ref 12–78)
ANION GAP SERPL CALCULATED.3IONS-SCNC: 10 MMOL/L (ref 4–13)
AST SERPL W P-5'-P-CCNC: 19 U/L (ref 5–45)
BACTERIA UR QL AUTO: ABNORMAL /HPF
BASOPHILS # BLD AUTO: 0.04 THOUSANDS/ΜL (ref 0–0.1)
BASOPHILS NFR BLD AUTO: 0 % (ref 0–1)
BILIRUB SERPL-MCNC: 0.4 MG/DL (ref 0.2–1)
BILIRUB UR QL STRIP: ABNORMAL
BUN SERPL-MCNC: 6 MG/DL (ref 5–25)
CALCIUM SERPL-MCNC: 9 MG/DL (ref 8.3–10.1)
CHLORIDE SERPL-SCNC: 92 MMOL/L (ref 100–108)
CLARITY UR: ABNORMAL
CO2 SERPL-SCNC: 29 MMOL/L (ref 21–32)
COLOR UR: YELLOW
CREAT SERPL-MCNC: 0.41 MG/DL (ref 0.6–1.3)
EOSINOPHIL # BLD AUTO: 0.2 THOUSAND/ΜL (ref 0–0.61)
EOSINOPHIL NFR BLD AUTO: 2 % (ref 0–6)
ERYTHROCYTE [DISTWIDTH] IN BLOOD BY AUTOMATED COUNT: 14.1 % (ref 11.6–15.1)
GFR SERPL CREATININE-BSD FRML MDRD: 91 ML/MIN/1.73SQ M
GLUCOSE SERPL-MCNC: 105 MG/DL (ref 65–140)
GLUCOSE UR STRIP-MCNC: NEGATIVE MG/DL
HCT VFR BLD AUTO: 33.8 % (ref 34.8–46.1)
HGB BLD-MCNC: 11.2 G/DL (ref 11.5–15.4)
HGB UR QL STRIP.AUTO: ABNORMAL
IMM GRANULOCYTES # BLD AUTO: 0.04 THOUSAND/UL (ref 0–0.2)
IMM GRANULOCYTES NFR BLD AUTO: 0 % (ref 0–2)
KETONES UR STRIP-MCNC: ABNORMAL MG/DL
LEUKOCYTE ESTERASE UR QL STRIP: ABNORMAL
LYMPHOCYTES # BLD AUTO: 1.76 THOUSANDS/ΜL (ref 0.6–4.47)
LYMPHOCYTES NFR BLD AUTO: 15 % (ref 14–44)
MAGNESIUM SERPL-MCNC: 1.3 MG/DL (ref 1.6–2.6)
MCH RBC QN AUTO: 28.9 PG (ref 26.8–34.3)
MCHC RBC AUTO-ENTMCNC: 33.1 G/DL (ref 31.4–37.4)
MCV RBC AUTO: 87 FL (ref 82–98)
MONOCYTES # BLD AUTO: 0.92 THOUSAND/ΜL (ref 0.17–1.22)
MONOCYTES NFR BLD AUTO: 8 % (ref 4–12)
NEUTROPHILS # BLD AUTO: 8.81 THOUSANDS/ΜL (ref 1.85–7.62)
NEUTS SEG NFR BLD AUTO: 75 % (ref 43–75)
NITRITE UR QL STRIP: POSITIVE
NON-SQ EPI CELLS URNS QL MICRO: ABNORMAL /HPF
NRBC BLD AUTO-RTO: 0 /100 WBCS
PH UR STRIP.AUTO: 5.5 [PH] (ref 4.5–8)
PLATELET # BLD AUTO: 359 THOUSANDS/UL (ref 149–390)
PMV BLD AUTO: 9.2 FL (ref 8.9–12.7)
POTASSIUM SERPL-SCNC: 3.7 MMOL/L (ref 3.5–5.3)
PROT SERPL-MCNC: 7.5 G/DL (ref 6.4–8.2)
PROT UR STRIP-MCNC: ABNORMAL MG/DL
RBC # BLD AUTO: 3.88 MILLION/UL (ref 3.81–5.12)
RBC #/AREA URNS AUTO: ABNORMAL /HPF
SODIUM SERPL-SCNC: 131 MMOL/L (ref 136–145)
SP GR UR STRIP.AUTO: >=1.03 (ref 1–1.03)
UROBILINOGEN UR QL STRIP.AUTO: 0.2 E.U./DL
WBC # BLD AUTO: 11.77 THOUSAND/UL (ref 4.31–10.16)
WBC #/AREA URNS AUTO: ABNORMAL /HPF

## 2018-09-18 PROCEDURE — 81001 URINALYSIS AUTO W/SCOPE: CPT | Performed by: EMERGENCY MEDICINE

## 2018-09-18 PROCEDURE — 87086 URINE CULTURE/COLONY COUNT: CPT | Performed by: EMERGENCY MEDICINE

## 2018-09-18 PROCEDURE — 80053 COMPREHEN METABOLIC PANEL: CPT | Performed by: EMERGENCY MEDICINE

## 2018-09-18 PROCEDURE — 85025 COMPLETE CBC W/AUTO DIFF WBC: CPT | Performed by: EMERGENCY MEDICINE

## 2018-09-18 PROCEDURE — 36415 COLL VENOUS BLD VENIPUNCTURE: CPT | Performed by: EMERGENCY MEDICINE

## 2018-09-18 PROCEDURE — 99285 EMERGENCY DEPT VISIT HI MDM: CPT

## 2018-09-18 PROCEDURE — 83735 ASSAY OF MAGNESIUM: CPT | Performed by: EMERGENCY MEDICINE

## 2018-09-18 PROCEDURE — 70450 CT HEAD/BRAIN W/O DYE: CPT

## 2018-09-18 RX ORDER — SULFAMETHOXAZOLE AND TRIMETHOPRIM 800; 160 MG/1; MG/1
1 TABLET ORAL ONCE
Status: COMPLETED | OUTPATIENT
Start: 2018-09-18 | End: 2018-09-18

## 2018-09-18 RX ORDER — SULFAMETHOXAZOLE AND TRIMETHOPRIM 800; 160 MG/1; MG/1
1 TABLET ORAL 2 TIMES DAILY
Qty: 14 TABLET | Refills: 0 | Status: SHIPPED | OUTPATIENT
Start: 2018-09-18 | End: 2018-09-25

## 2018-09-18 RX ADMIN — SULFAMETHOXAZOLE AND TRIMETHOPRIM 1 TABLET: 800; 160 TABLET ORAL at 13:31

## 2018-09-18 NOTE — ED PROVIDER NOTES
Pt Name: Venice Ramires  MRN: [id number]  [place] 1928  Age/Sex: 80 y o  female  Date of evaluation: 2018  PCP: No primary care provider on file  CHIEF COMPLAINT    Chief Complaint   Patient presents with    Altered Mental Status     pt has h/o dementia, UTI, increased confusion, sweeling in the feet, and poor appetite per family          HPI    80 y o  female presenting with increased confusion, swelling of the feet, and poor appetite  The patient's family member who accompanies her states that she has had worsening mental status since breaking her femur approximately 7 or 8 weeks ago and had very similar symptoms urinary tract infection as well as hyponatremia and hypokalemia about 2 weeks ago  Patient finished a course of Keflex for that urinary tract infection about 1 week ago  Over the past few days, her symptoms have worsened again with worsening confusion as well as increased sundowning in the mornings and evenings  He also notes that she has a poor appetite is only a few bites that time although she is still able tolerate fluids and her medications  Patient lives at home with her family  HPI      Past Medical and Surgical History    Past Medical History:   Diagnosis Date    Cardiac disease     Dementia     Hypertension     Psychiatric disorder     anxiety       Past Surgical History:   Procedure Laterality Date     SECTION      CHOLECYSTECTOMY      REPLACEMENT TOTAL KNEE         History reviewed  No pertinent family history  Social History   Substance Use Topics    Smoking status: Never Smoker    Smokeless tobacco: Never Used    Alcohol use No      Comment: "one gin a day"           Allergies    No Known Allergies    Home Medications    Prior to Admission medications    Medication Sig Start Date End Date Taking?  Authorizing Provider   acetaminophen (TYLENOL) 325 mg tablet Take 3 tablets (975 mg total) by mouth 3 (three) times a day 18   [facility], MD   ALPRAZolam (XANAX) 0 5 mg tablet Take 1 tablet (0 5 mg total) by mouth daily at bedtime as needed for anxiety for up to 10 days 8/9/18 8/19/18  Haritha Correa MD   amLODIPine-valsartan (EXFORGE) 5-320 MG per tablet Take 1 tablet by mouth daily    Historical Provider, MD   gabapentin (NEURONTIN) 100 mg capsule Take 2 capsules (200 mg total) by mouth daily at bedtime 8/9/18   Haritha Correa MD   oxyCODONE (ROXICODONE) 5 mg immediate release tablet Take 1 tablet (5 mg total) by mouth every 6 (six) hours as needed for moderate pain or severe pain Max Daily Amount: 20 mg 8/9/18   Haritha Correa MD   polyethylene glycol (MIRALAX) 17 g packet Take 17 g by mouth daily 8/10/18   Haritha Correa MD   senna (SENOKOT) 8 6 mg Take 2 tablets (17 2 mg total) by mouth daily at bedtime 8/9/18   Haritha Correa MD   sertraline (ZOLOFT) 25 mg tablet Take 50 mg by mouth daily    Historical Provider, MD   sodium chloride 1 g tablet Take 2 tablets (2 g total) by mouth 3 (three) times a day with meals 8/9/18   Haritha Correa MD           Review of Systems    Review of Systems   Constitutional: Negative for activity change, chills and fever  HENT: Negative for drooling and facial swelling  Eyes: Negative for pain, discharge and visual disturbance  Respiratory: Negative for apnea, cough, chest tightness, shortness of breath and wheezing  Cardiovascular: Negative for chest pain and leg swelling  Gastrointestinal: Negative for abdominal pain, constipation, diarrhea, nausea and vomiting  Genitourinary: Negative for difficulty urinating, dysuria and urgency  Musculoskeletal: Positive for gait problem and joint swelling  Negative for arthralgias and back pain  Skin: Negative for color change and rash  Neurological: Negative for dizziness, speech difficulty, weakness and headaches  Psychiatric/Behavioral: Positive for confusion and decreased concentration   Negative for agitation and behavioral problems  All other systems reviewed and negative  Physical Exam      ED Triage Vitals [09/18/18 1006]   Temperature Pulse Respirations Blood Pressure SpO2   99 1 °F (37 3 °C) 92 18 128/61 98 %      Temp Source Heart Rate Source Patient Position - Orthostatic VS BP Location FiO2 (%)   Oral Monitor Sitting Left arm --      Pain Score       No Pain               Physical Exam   Constitutional: She is oriented to person, place, and time  She appears well-developed and well-nourished  HENT:   Head: Normocephalic and atraumatic  Nose: Nose normal    Mouth/Throat: Oropharynx is clear and moist    Eyes: Conjunctivae and EOM are normal  Pupils are equal, round, and reactive to light  Neck: Normal range of motion  Neck supple  Cardiovascular: Normal rate, regular rhythm, normal heart sounds and intact distal pulses  Pulmonary/Chest: Effort normal and breath sounds normal  No respiratory distress  She has no wheezes  She has no rales  Abdominal: Soft  She exhibits no distension  There is no tenderness  There is no rebound and no guarding  Musculoskeletal: Normal range of motion  She exhibits no edema or deformity  Right femur encased in brace and tender palpation with ranging of the right leg  Neurological: She is alert and oriented to person, place, and time  Skin: Skin is warm and dry  No rash noted  No erythema  Psychiatric:   Patient pleasant able to answer simple questions but is not oriented to place or situation  Slightly more confused than baseline per family              Diagnostic Results      Labs:    Results for orders placed or performed during the hospital encounter of 09/18/18   CBC and differential   Result Value Ref Range    WBC 11 77 (H) 4 31 - 10 16 Thousand/uL    RBC 3 88 3 81 - 5 12 Million/uL    Hemoglobin 11 2 (L) 11 5 - 15 4 g/dL    Hematocrit 33 8 (L) 34 8 - 46 1 %    MCV 87 82 - 98 fL    MCH 28 9 26 8 - 34 3 pg    MCHC 33 1 31 4 - 37 4 g/dL    RDW 14 1 11 6 - 15 1 %    MPV 9 2 8 9 - 12 7 fL    Platelets 859 118 - 803 Thousands/uL    nRBC 0 /100 WBCs    Neutrophils Relative 75 43 - 75 %    Immat GRANS % 0 0 - 2 %    Lymphocytes Relative 15 14 - 44 %    Monocytes Relative 8 4 - 12 %    Eosinophils Relative 2 0 - 6 %    Basophils Relative 0 0 - 1 %    Neutrophils Absolute 8 81 (H) 1 85 - 7 62 Thousands/µL    Immature Grans Absolute 0 04 0 00 - 0 20 Thousand/uL    Lymphocytes Absolute 1 76 0 60 - 4 47 Thousands/µL    Monocytes Absolute 0 92 0 17 - 1 22 Thousand/µL    Eosinophils Absolute 0 20 0 00 - 0 61 Thousand/µL    Basophils Absolute 0 04 0 00 - 0 10 Thousands/µL   Comprehensive metabolic panel   Result Value Ref Range    Sodium 131 (L) 136 - 145 mmol/L    Potassium 3 7 3 5 - 5 3 mmol/L    Chloride 92 (L) 100 - 108 mmol/L    CO2 29 21 - 32 mmol/L    ANION GAP 10 4 - 13 mmol/L    BUN 6 5 - 25 mg/dL    Creatinine 0 41 (L) 0 60 - 1 30 mg/dL    Glucose 105 65 - 140 mg/dL    Calcium 9 0 8 3 - 10 1 mg/dL    AST 19 5 - 45 U/L    ALT 15 12 - 78 U/L    Alkaline Phosphatase 80 46 - 116 U/L    Total Protein 7 5 6 4 - 8 2 g/dL    Albumin 3 1 (L) 3 5 - 5 0 g/dL    Total Bilirubin 0 40 0 20 - 1 00 mg/dL    eGFR 91 ml/min/1 73sq m   UA w Reflex to Microscopic w Reflex to Culture   Result Value Ref Range    Color, UA Yellow     Clarity, UA Cloudy     Specific Gravity, UA >=1 030 1 003 - 1 030    pH, UA 5 5 4 5 - 8 0    Leukocytes, UA Large (A) Negative    Nitrite, UA Positive (A) Negative    Protein, UA 30 (1+) (A) Negative mg/dl    Glucose, UA Negative Negative mg/dl    Ketones, UA Trace (A) Negative mg/dl    Urobilinogen, UA 0 2 0 2, 1 0 E U /dl E U /dl    Bilirubin, UA Small (A) Negative    Blood, UA Small (A) Negative   Magnesium   Result Value Ref Range    Magnesium 1 3 (L) 1 6 - 2 6 mg/dL   Urine Microscopic   Result Value Ref Range    RBC, UA 4-10 (A) None Seen, 0-5 /hpf    WBC, UA 30-50 (A) None Seen, 0-5, 5-55, 5-65 /hpf    Epithelial Cells Occasional None Seen, Occasional /hpf    Bacteria, UA Moderate (A) None Seen, Occasional /hpf       All labs reviewed and utilized in the medical decision making process    Radiology:    CT head without contrast   Final Result      No acute intracranial hemorrhage seen   Moderate periventricular and white matter hypodensity seen related to chronic small vessel ischemic changes   No CT signs of acute territorial infarction               Workstation performed: [id number]             All radiology studies independently viewed by me and interpreted by the radiologist     Procedure    Procedures    CritCare Time      ED Course of Care and Re-Assessments      Patient able to tolerate oral Bactrim in emergency department  Medications   sulfamethoxazole-trimethoprim (BACTRIM DS) 800-160 mg per tablet 1 tablet (1 tablet Oral Given 9/18/18 1331)           FINAL IMPRESSION    Final diagnoses:   UTI (urinary tract infection)   Dementia   Hyponatremia   Hypomagnesemia         DISPOSITION/PLAN    72-year-old female with history and symptoms above  Vital signs reassuring, examination likewise reassuring with no significant abnormalities other than noted in mental status and consistent with previously treated right femur fracture  Lab sent returned remarkable primarily for mild hyponatremia, mild hypomagnesemia, mild anemia, a urinalysis concerning for infection  As patient recently treated with Keflex, started on Bactrim instead with concern for medication interactions from a quinolone based on her medication reconciliation  Likely discussion with patient's family member regarding admission for placement versus care at home, family member states that he feels that they are able to care for her at home at this time and does not wish placed care  Discharged strict return precautions, follow up primary care doctor    Time reflects when diagnosis was documented in both MDM as applicable and the Disposition within this note     Time User Action Codes Description Comment    9/18/2018  1:15 PM Kellen Zarate Add [N39 0] UTI (urinary tract infection)     9/18/2018  1:15 PM Kellen Zarate Add [F03 90] Dementia     9/18/2018  1:15 PM Floria Keto T Add [E87 1] Hyponatremia     9/18/2018  1:15 PM Floria Keto T Add [E83 42] Hypomagnesemia       ED Disposition     ED Disposition Condition Comment    Discharge  José Miguel Hernandez discharge to home/self care      Condition at discharge: Good        Follow-up Information     Follow up With Specialties Details Why Contact Info    Your Primary Care doctor  Go in 2 days As scheduled             PATIENT REFERRED TO:    Your Primary Care doctor    Go in 2 days  As scheduled      DISCHARGE MEDICATIONS:    Discharge Medication List as of 9/18/2018  1:17 PM      START taking these medications    Details   sulfamethoxazole-trimethoprim (BACTRIM DS) 800-160 mg per tablet Take 1 tablet by mouth 2 (two) times a day for 7 days smx-tmp DS (BACTRIM) 800-160 mg tabs (1tab q12 D10), Starting Tue 9/18/2018, Until Tue 9/25/2018, Print         CONTINUE these medications which have NOT CHANGED    Details   acetaminophen (TYLENOL) 325 mg tablet Take 3 tablets (975 mg total) by mouth 3 (three) times a day, Starting Thu 8/9/2018, No Print      ALPRAZolam (XANAX) 0 5 mg tablet Take 1 tablet (0 5 mg total) by mouth daily at bedtime as needed for anxiety for up to 10 days, Starting Thu 8/9/2018, Until Sun 8/19/2018, Print      amLODIPine-valsartan (EXFORGE) 5-320 MG per tablet Take 1 tablet by mouth daily, Historical Med      gabapentin (NEURONTIN) 100 mg capsule Take 2 capsules (200 mg total) by mouth daily at bedtime, Starting Thu 8/9/2018, Print      oxyCODONE (ROXICODONE) 5 mg immediate release tablet Take 1 tablet (5 mg total) by mouth every 6 (six) hours as needed for moderate pain or severe pain Max Daily Amount: 20 mg, Starting Thu 8/9/2018, Print      polyethylene glycol (MIRALAX) 17 g packet Take 17 g by mouth daily, Starting Fri 8/10/2018, Print      senna (SENOKOT) 8 6 mg Take 2 tablets (17 2 mg total) by mouth daily at bedtime, Starting Thu 8/9/2018, Print      sertraline (ZOLOFT) 25 mg tablet Take 50 mg by mouth daily, Historical Med      sodium chloride 1 g tablet Take 2 tablets (2 g total) by mouth 3 (three) times a day with meals, Starting Thu 8/9/2018, Print             No discharge procedures on file           MD Cricket Carlin MD  09/18/18 2009

## 2018-09-18 NOTE — DISCHARGE INSTRUCTIONS
Urinary Traction Infection in Older Adults   WHAT YOU NEED TO KNOW:   A urinary tract infection (UTI) is caused by bacteria that get inside your urinary tract  Your urinary tract includes your kidneys, ureters, bladder, and urethra  Urine is made in your kidneys, and it flows from the ureters to the bladder  Urine leaves the bladder through the urethra  A UTI is more common in your lower urinary tract, which includes your bladder and urethra  DISCHARGE INSTRUCTIONS:   Return to the emergency department if:   · You are urinating very little or not at all  · You are vomiting  · You have a high fever with shaking chills  · You have side or back pain that gets worse  Contact your healthcare provider if:   · You have a fever  · You are a woman and you have increased white or yellow discharge from your vagina  · You do not feel better after 2 days of taking antibiotics  · You have questions or concerns about your condition or care  Medicines:   · Medicines  help treat the bacterial infection or decrease pain and burning when you urinate  You may also need medicines to decrease the urge to urinate often  Your healthcare provider may recommend cranberry juice or cranberry supplements to help decrease your symptoms  · Take your medicine as directed  Contact your healthcare provider if you think your medicine is not helping or if you have side effects  Tell him or her if you are allergic to any medicine  Keep a list of the medicines, vitamins, and herbs you take  Include the amounts, and when and why you take them  Bring the list or the pill bottles to follow-up visits  Carry your medicine list with you in case of an emergency  Self-care:   · Urinate when you feel the urge  Do not hold your urine because bacteria can grow in the bladder if urine stays in the bladder too long  It may be helpful to urinate at least every 3 to 4 hours  · Drink liquids as directed    Liquids can help flush bacteria from your urinary tract  Ask how much liquid to drink each day and which liquids are best for you  You may need to drink more liquids than usual to help flush out the bacteria  Do not drink alcohol, caffeine, and citrus juices  These can irritate your bladder and increase your symptoms  · Apply heat  on your abdomen for 20 to 30 minutes every 2 hours for as many days as directed  Heat helps decrease discomfort and pressure in your bladder  Prevent a UTI:   · Women should wipe front to back  after urinating or having a bowel movement  This may prevent germs from getting into the urinary tract  · Urinate after you have sex  to flush away bacteria that can enter your urinary tract during sex  · Wear cotton underwear and clothes that fit loose  Tight pants and nylon underwear can trap moisture and cause bacteria to grow  Follow up with your healthcare provider as directed:  Write down your questions so you remember to ask them during your visits  © 2017 [employer] Information is for End User's use only and may not be sold, redistributed or otherwise used for commercial purposes  All illustrations and images included in CareNotes® are the copyrighted property of [employer]  or [employer]  The above information is an  only  It is not intended as medical advice for individual conditions or treatments  Talk to your doctor, nurse or pharmacist before following any medical regimen to see if it is safe and effective for you

## 2018-09-20 ENCOUNTER — OFFICE VISIT (OUTPATIENT)
Dept: INTERNAL MEDICINE CLINIC | Facility: CLINIC | Age: 83
End: 2018-09-20
Payer: COMMERCIAL

## 2018-09-20 VITALS — HEART RATE: 96 BPM | DIASTOLIC BLOOD PRESSURE: 62 MMHG | SYSTOLIC BLOOD PRESSURE: 106 MMHG | RESPIRATION RATE: 12 BRPM

## 2018-09-20 DIAGNOSIS — I10 ESSENTIAL HYPERTENSION: ICD-10-CM

## 2018-09-20 DIAGNOSIS — R13.10 DYSPHAGIA, UNSPECIFIED TYPE: ICD-10-CM

## 2018-09-20 DIAGNOSIS — E87.1 HYPONATREMIA: ICD-10-CM

## 2018-09-20 DIAGNOSIS — I35.0 SEVERE AORTIC STENOSIS: Primary | ICD-10-CM

## 2018-09-20 DIAGNOSIS — N30.00 ACUTE CYSTITIS WITHOUT HEMATURIA: ICD-10-CM

## 2018-09-20 DIAGNOSIS — F02.81 LATE ONSET ALZHEIMER'S DISEASE WITH BEHAVIORAL DISTURBANCE (HCC): ICD-10-CM

## 2018-09-20 DIAGNOSIS — S72.401A CLOSED FRACTURE OF DISTAL END OF RIGHT FEMUR, UNSPECIFIED FRACTURE MORPHOLOGY, INITIAL ENCOUNTER (HCC): ICD-10-CM

## 2018-09-20 DIAGNOSIS — G30.1 LATE ONSET ALZHEIMER'S DISEASE WITH BEHAVIORAL DISTURBANCE (HCC): ICD-10-CM

## 2018-09-20 PROBLEM — Z71.89 GOALS OF CARE, COUNSELING/DISCUSSION: Status: RESOLVED | Noted: 2018-08-06 | Resolved: 2018-09-20

## 2018-09-20 PROBLEM — E87.6 HYPOKALEMIA: Status: RESOLVED | Noted: 2018-09-06 | Resolved: 2018-09-20

## 2018-09-20 PROBLEM — M79.641 HAND PAIN, RIGHT: Status: RESOLVED | Noted: 2018-08-02 | Resolved: 2018-09-20

## 2018-09-20 PROBLEM — G60.9 IDIOPATHIC PERIPHERAL NEUROPATHY: Status: ACTIVE | Noted: 2018-09-20

## 2018-09-20 PROBLEM — G93.40 ENCEPHALOPATHY: Status: RESOLVED | Noted: 2018-09-04 | Resolved: 2018-09-20

## 2018-09-20 PROBLEM — I11.9 BENIGN HYPERTENSIVE HEART DISEASE WITHOUT CONGESTIVE HEART FAILURE: Status: ACTIVE | Noted: 2018-09-20

## 2018-09-20 PROBLEM — A41.9 SEPSIS (HCC): Status: RESOLVED | Noted: 2018-09-04 | Resolved: 2018-09-20

## 2018-09-20 PROBLEM — D72.829 LEUKOCYTOSIS: Status: RESOLVED | Noted: 2018-08-07 | Resolved: 2018-09-20

## 2018-09-20 PROBLEM — N39.0 UTI (URINARY TRACT INFECTION): Status: RESOLVED | Noted: 2018-09-04 | Resolved: 2018-09-20

## 2018-09-20 PROBLEM — R52 PAIN: Status: RESOLVED | Noted: 2018-08-06 | Resolved: 2018-09-20

## 2018-09-20 PROBLEM — R19.7 DIARRHEA: Status: RESOLVED | Noted: 2018-08-02 | Resolved: 2018-09-20

## 2018-09-20 LAB — BACTERIA UR CULT: NORMAL

## 2018-09-20 PROCEDURE — 99214 OFFICE O/P EST MOD 30 MIN: CPT | Performed by: INTERNAL MEDICINE

## 2018-09-20 RX ORDER — LOSARTAN POTASSIUM 100 MG/1
100 TABLET ORAL DAILY
Qty: 90 TABLET | Refills: 1 | Status: SHIPPED | OUTPATIENT
Start: 2018-09-20 | End: 2018-12-09 | Stop reason: SDUPTHER

## 2018-09-20 NOTE — PROGRESS NOTES
INTERNAL MEDICINE INITIAL OFFICE VISIT  [facility] - [facility] Critical access hospital0 [facility]    NAME: Joshua Downing  AGE: 80 y o  SEX: female  : 1928     DATE: 2018     Assessment and Plan:     Assessment  1  Severe aortic stenosis    2  Closed fracture of distal end of right femur, unspecified fracture morphology, initial encounter ([facility] [address] )    3  Dysphagia, unspecified type    4  Essential hypertension    5  Acute cystitis without hematuria    6  Hyponatremia    7  Late onset Alzheimer's disease with behavioral disturbance      Plan    Patient's medical history was reviewed and updated today  Significant amount of history came from the patient's son  Recent hospital admissions and testing was reviewed  Her blood pressure was on the lower side  She has been taking amlodipine-valsartan combination  Discussed with son about the current valsartan recall  I think having her blood pressure as low as it has been can be detrimental to her health and increase risk for further falls  Will switch to just losartan  Recent urinalysis was reviewed  Finish course of bactrim as prescribed  She has underlying urinary incontinence and requires use of diapers  She is at increased risk for urinary tract infections  Discussed signs/symptoms of UTIs and when to treat vs when to observe (ie, asymptomatic bacteriuria)  Continue modified dysphagia diet as recommended by the hospital     Continue to follow-up with orthopedics and follow their recommendations  Continue use of knee brace  Discussed my concerns about xanax and using further psychiatric medications with her underlying dementia  Discussed concept about polypharmacy  Would not recommend any medications for treatment of her dementia at this time  Would not recommend appetite stimulant  Referral provider for [facility] for geriatric evaluation  Call with any questions/concerns   Letter written for son regarding her inability to manage her financial affairs  Repeat labs in 2 weeks  Orders Placed This Encounter   Procedures    Urine culture    Basic metabolic panel    CBC    Ambulatory referral to Geriatrics     New Medications Ordered This Visit   Medications    losartan (COZAAR) 100 MG tablet     Sig: Take 1 tablet (100 mg total) by mouth daily     Dispense:  90 tablet     Refill:  1     Return in about 2 months (around 11/20/2018) for Follow-up  Chief Complaint:     Chief Complaint   Patient presents with    Establish Care      History of Present Illness:     Patient presents to establish care  Patient has a history of closed fracture of the right distal femur back in August of 2018  Patient was treated non operatively and has been advised to continue knee brace  Also during that admission patient was found to have hyponatremia and she was given salt tablets and placed on a fluid restriction  Hyponatremia was thought to be due to patient being on sertraline as well as a component of SIADH  Patient and family were instructed to stop the sertraline which she has been doing  Patient's other medical history includes severe aortic stenosis, dementia, hypertension, neuropathy, macular degeneration, and glaucoma  More recently in the beginning of September patient was admitted to the hospital for acute encephalopathy and found to have a urinary tract infection  Initially patient was treated with Rocephin and then she was ultimately discharged on Keflex  During that admission patient had hyponatremia and hypokalemia which were improved with repletion  Patient saw Orthopedics for follow-up on 09/12/2018  She was instructed to continue with knee brace and to remain nonweightbearing  Patient was instructed to follow up with Orthopedics in 2-4 weeks for re-evaluation and to get new x-rays  Patient's most recent x-ray had showed stable alignment      She ended up going to ED a few days ago because of recurrent UTI symptoms  Urine appeared to be dirty, but final culture showed >100,000K mixed contaminants  Previous urine culture grew Klebsiella  She was discharged on bactrim  She continues on salt tablets and her most recent sodium was 131  Son and his wife take care of her currently  She has significant sundowning as well as depression/anxiety  Tried on duloxetine after being taken off sertraline but didn't do well with that  Patient is only oriented to self  Cannot do ADLs  Cannot manage her own money/finances  Son is her POA  He would like to take over her financial affairs due to her advanced dementia  Patient is pleasant and only complaints of pain from the knee brace  She is currently in wheelchair with her right leg slightly elevated  The following portions of the patient's history were reviewed and updated as appropriate: allergies, current medications, past family history, past medical history, past social history, past surgical history and problem list      Review of Systems:     Review of Systems   Constitutional: Positive for activity change (Cannot bear weight on right leg) and appetite change (Very little appetite)  Negative for chills, fatigue and fever  HENT: Positive for hearing loss  Negative for congestion, postnasal drip, rhinorrhea, sore throat, tinnitus and trouble swallowing  Eyes: Negative for pain, discharge, redness and visual disturbance  Respiratory: Negative for apnea, cough, chest tightness, shortness of breath and wheezing  Cardiovascular: Negative for chest pain, palpitations and leg swelling  Gastrointestinal: Negative for abdominal distention, abdominal pain, blood in stool, constipation, diarrhea, nausea and vomiting  Endocrine: Negative for cold intolerance, heat intolerance, polydipsia, polyphagia and polyuria  Genitourinary: Positive for difficulty urinating (Urinary incontinence)  Negative for dysuria, frequency, hematuria and urgency  Musculoskeletal: Positive for arthralgias and gait problem  Negative for back pain, joint swelling, myalgias, neck pain and neck stiffness  Skin: Negative for color change, rash and wound  Neurological: Negative for dizziness, tremors, seizures, syncope, facial asymmetry, speech difficulty, weakness, light-headedness, numbness and headaches  Hematological: Negative for adenopathy  Does not bruise/bleed easily  Psychiatric/Behavioral: Positive for agitation, behavioral problems and sleep disturbance  Negative for confusion, hallucinations and suicidal ideas  The patient is not nervous/anxious  Past Medical History:     Past Medical History:   Diagnosis Date    Benign hypertensive heart disease without congestive heart failure 2018    Dementia     Dysphagia 2018    Essential hypertension 2018    [facility]oral fracture ([facility] ) 2018    Glaucoma 2015    Hypertension     Idiopathic peripheral neuropathy 2018    Macular degeneration 2015    Sepsis ([facility] ) 2018    Severe aortic stenosis 2018    UTI (urinary tract infection) 2018      Past Surgical History:     Past Surgical History:   Procedure Laterality Date     SECTION      CHOLECYSTECTOMY      REPLACEMENT TOTAL KNEE        Social History:   She reports that she has never smoked  She has never used smokeless tobacco  She reports that she does not drink alcohol or use drugs  Family History:   No family history on file       Current Medications:     Outpatient Medications Prior to Visit   Medication Sig Dispense Refill    acetaminophen (TYLENOL) 325 mg tablet Take 3 tablets (975 mg total) by mouth 3 (three) times a day 30 tablet 0    ALPRAZolam (XANAX) 0 5 mg tablet Take 1 tablet (0 5 mg total) by mouth daily at bedtime as needed for anxiety for up to 10 days 10 tablet 0    oxyCODONE (ROXICODONE) 5 mg immediate release tablet Take 1 tablet (5 mg total) by mouth every 6 (six) hours as needed for moderate pain or severe pain Max Daily Amount: 20 mg 20 tablet 0    polyethylene glycol (MIRALAX) 17 g packet Take 17 g by mouth daily 14 each 0    senna (SENOKOT) 8 6 mg Take 2 tablets (17 2 mg total) by mouth daily at bedtime 120 each 0    sodium chloride 1 g tablet Take 2 tablets (2 g total) by mouth 3 (three) times a day with meals 30 tablet 0    sulfamethoxazole-trimethoprim (BACTRIM DS) 800-160 mg per tablet Take 1 tablet by mouth 2 (two) times a day for 7 days smx-tmp DS (BACTRIM) 800-160 mg tabs (1tab q12 D10) 14 tablet 0    amLODIPine-valsartan (EXFORGE) 5-320 MG per tablet Take 1 tablet by mouth daily      gabapentin (NEURONTIN) 100 mg capsule Take 2 capsules (200 mg total) by mouth daily at bedtime (Patient not taking: Reported on 9/20/2018 ) 30 capsule 0    sertraline (ZOLOFT) 25 mg tablet Take 50 mg by mouth daily       No facility-administered medications prior to visit  Allergies:   No Known Allergies     Physical Exam:     /62 (BP Location: Left arm, Patient Position: Sitting)   Pulse 96   Resp 12     Physical Exam   Eyes: Conjunctivae are normal  Right eye exhibits no discharge  Left eye exhibits no discharge  Neck: Neck supple  No JVD present  Cardiovascular: Normal rate and regular rhythm  Murmur heard  Pulmonary/Chest: Effort normal and breath sounds normal  No respiratory distress  She has no wheezes  She has no rales  Abdominal: Soft  Bowel sounds are normal  She exhibits no distension  There is no tenderness  There is no rebound  Musculoskeletal: She exhibits no edema  Right knee brace applied  Decreased muscle mass   Lymphadenopathy:     She has no cervical adenopathy  Neurological: She is alert  Oriented to person only  Gait not tested; patient in wheelchair  0/3 memory recall   Skin: Skin is warm and dry  Psychiatric: She has a normal mood and affect  Her behavior is normal    Vitals reviewed       Data:     Laboratory Results: I have personally reviewed the pertinent laboratory results/reports   Radiology/Other Diagnostic Testing Results: I have personally reviewed pertinent reports  Fall Risk  The patient has a history of falls  A risk assessment for falls was completed  A plan of care for falls was documented      Cl Glasgow DO  [facility] [address] [place] [address]

## 2018-09-20 NOTE — PATIENT INSTRUCTIONS
Fall Prevention   AMBULATORY CARE:   Fall prevention  includes ways to make your home and other areas safer  It also includes ways you can move more carefully to prevent a fall  Health conditions that cause changes in your blood pressure, vision, or muscle strength and coordination may increase your risk for falls  Medicines may also increase your risk for falls if they make you dizzy, weak, or sleepy  Call 911 or have someone else call if:   · You have fallen and are unconscious  · You have fallen and cannot move part of your body  Contact your healthcare provider if:   · You have fallen and have pain or a headache  · You have questions or concerns about your condition or care  Fall prevention tips:   · Stand or sit up slowly  This may help you keep your balance and prevent falls  · Use assistive devices as directed  Your healthcare provider may suggest that you use a cane or walker to help you keep your balance  You may need to have grab bars put in your bathroom near the toilet or in the shower  · Wear shoes that fit well and have soles that   Wear shoes both inside and outside  Use slippers with good   Do not wear shoes with high heels  · Wear a personal alarm  This is a device that allows you to call 911 if you fall and need help  Ask your healthcare provider for more information  · Stay active  Exercise can help strengthen your muscles and improve your balance  Your healthcare provider may recommend water aerobics or walking  He or she may also recommend physical therapy to improve your coordination  Never start an exercise program without talking to your healthcare provider first      · Manage your medical conditions  Keep all appointments with your healthcare providers  Visit your eye doctor as directed  Home safety tips:   · Add items to prevent falls in the bathroom  Put nonslip strips on your bath or shower floor to prevent you from slipping   Use a bath mat if you do not have carpet in the bathroom  This will prevent you from falling when you step out of the bath or shower  Use a shower seat so you do not need to stand while you shower  Sit on the toilet or a chair in your bathroom to dry yourself and put on clothing  This will prevent you from losing your balance from drying or dressing yourself while you are standing  · Keep paths clear  Remove books, shoes, and other objects from walkways and stairs  Place cords for telephones and lamps out of the way so that you do not need to walk over them  Tape them down if you cannot move them  Remove small rugs  If you cannot remove a rug, secure it with double-sided tape  This will prevent you from tripping  · Install bright lights in your home  Use night lights to help light paths to the bathroom or kitchen  Always turn on the light before you start walking  · Keep items you use often on shelves within reach  Do not use a step stool to help you reach an item  · Paint or place reflective tape on the edges of your stairs  This will help you see the stairs better  Follow up with your healthcare provider as directed:  Write down your questions so you remember to ask them during your visits  © 2017 [employer] [employer] Information is for End User's use only and may not be sold, redistributed or otherwise used for commercial purposes  All illustrations and images included in CareNotes® are the copyrighted property of [employer]  or [employer]  The above information is an  only  It is not intended as medical advice for individual conditions or treatments  Talk to your doctor, nurse or pharmacist before following any medical regimen to see if it is safe and effective for you  Dementia   AMBULATORY CARE:   Dementia  is a condition that causes loss of memory, thought control, and judgment  Dementia may develop quickly over a few months after a head injury or stroke   It may develop slowly over many years if you have Alzheimer disease  Dementia cannot be cured or prevented, but treatment may slow or reduce your symptoms  Common symptoms include the following:   · Loss of short-term memory, followed by loss of long-term memory    · Trouble remembering to go to the bathroom, to urinate, or have a bowel movement    · Anger or violent behavior     · Depression, anxiety, or hallucinations  Seek care immediately if  you or someone close to you notices:  · You have signs of delirium, such as extreme confusion, and seeing or hearing things that are not there  · You become angry or violent, and cannot be calmed down  · You faint and cannot be woken  Contact your healthcare provider if  you or someone close to you notices:  · You have a fever  · You have increased confusion, behavior, or mood changes  · You have questions or concerns about your condition or care  Treatment for dementia  may include medicines to slow memory loss  You may also need medicines to help control anger, decrease anxiety, or improve your mood  Take your medicines as directed  Manage dementia:   · Keep your mind and body active  Do activities that you love, such as art, gardening, or listening to music  Call or visit people often  This will keep your social skills sharp, and may help reduce depression  · Write daily schedules and routines  Record medical appointments, times to take your medicines, meal times, or any other things to remember  Write down reminders to use the bathroom if you have trouble remembering  You may need to ask someone to write things down for you  · Place clocks and calendars where you can see them  This will help you remember appointments and tasks  · Do not smoke  Nicotine and other chemicals in cigarettes and cigars can cause lung damage  Ask your healthcare provider for information if you currently smoke and need help to quit   E-cigarettes or smokeless tobacco still contain nicotine  Talk to your healthcare provider before you use these products  · Eat healthy foods  Examples are fruits, vegetables, whole-grain breads, low-fat dairy products, beans, lean meats, and fish  Ask if you need to be on a special diet  · Ask your healthcare provider for a list of organizations that can help  You may begin to need an in-home aide to help you remember your daily tasks  Arrange for help while you are thinking clearly  Follow up with your healthcare provider as directed:  Ask someone to go with you to help you remember what your healthcare provider tells you  The person can take notes for you during the visit and go over the notes with you later  Write down your questions so you remember to ask them during your visits  © 2017 [facility] [facility] Information is for End User's use only and may not be sold, redistributed or otherwise used for commercial purposes  All illustrations and images included in CareNotes® are the copyrighted property of [employer] M [employer] Inc  or [employer]  The above information is an  only  It is not intended as medical advice for individual conditions or treatments  Talk to your doctor, nurse or pharmacist before following any medical regimen to see if it is safe and effective for you

## 2018-09-20 NOTE — LETTER
September 20, 2018     Patient: Marely Stone   YOB: 1928   Date of Visit: 9/20/2018       To Whom it May Concern:    Olinda Spears is under my professional care  She was seen in my office on 9/20/2018  Patient has advanced dementia and is unable to make decisions for herself at this time  She is unable to handle her own finances  If you have any questions or concerns, please don't hesitate to call           Sincerely,          Federico Cleaning DO        CC: No Recipients

## 2018-10-02 ENCOUNTER — TELEPHONE (OUTPATIENT)
Dept: INTERNAL MEDICINE CLINIC | Facility: CLINIC | Age: 83
End: 2018-10-02

## 2018-10-02 DIAGNOSIS — N30.00 ACUTE CYSTITIS WITHOUT HEMATURIA: ICD-10-CM

## 2018-10-02 NOTE — TELEPHONE ENCOUNTER
PT IS GOING TO SEE DR GUPTA OFFICE TOMORROW AND THEY NEED US TO FAX OVER HER URNIE CULTURE WITH THE SENSITIVITY TO [facility] FAXED THEY GOT THE REGULAR RESULTS  [phone]

## 2018-10-02 NOTE — TELEPHONE ENCOUNTER
Patient was just in for UTI  It has recurred  Requesting that medication be called in to the pharmacy  [facility]   Please call Jay Doty to let him know when order is sent so he can go to the pharmacy to pick it up  [phone]

## 2018-10-02 NOTE — TELEPHONE ENCOUNTER
Please fax over urine culture results from 9/4/2018 in Epic  Not going to send in antibiotics right now since she is going to be evaluated tomorrow

## 2018-10-10 ENCOUNTER — OFFICE VISIT (OUTPATIENT)
Dept: OBGYN CLINIC | Facility: CLINIC | Age: 83
End: 2018-10-10
Payer: COMMERCIAL

## 2018-10-10 ENCOUNTER — APPOINTMENT (OUTPATIENT)
Dept: RADIOLOGY | Facility: CLINIC | Age: 83
End: 2018-10-10
Payer: COMMERCIAL

## 2018-10-10 VITALS
SYSTOLIC BLOOD PRESSURE: 116 MMHG | WEIGHT: 104 LBS | DIASTOLIC BLOOD PRESSURE: 88 MMHG | HEIGHT: 63 IN | HEART RATE: 98 BPM | BODY MASS INDEX: 18.43 KG/M2

## 2018-10-10 DIAGNOSIS — S72.401A CLOSED FRACTURE OF DISTAL END OF RIGHT FEMUR, UNSPECIFIED FRACTURE MORPHOLOGY, INITIAL ENCOUNTER (HCC): ICD-10-CM

## 2018-10-10 DIAGNOSIS — M25.561 ACUTE PAIN OF RIGHT KNEE: Primary | ICD-10-CM

## 2018-10-10 DIAGNOSIS — M25.561 ACUTE PAIN OF RIGHT KNEE: ICD-10-CM

## 2018-10-10 PROCEDURE — 73560 X-RAY EXAM OF KNEE 1 OR 2: CPT

## 2018-10-10 PROCEDURE — 99213 OFFICE O/P EST LOW 20 MIN: CPT | Performed by: ORTHOPAEDIC SURGERY

## 2018-10-10 NOTE — ASSESSMENT & PLAN NOTE
Patient is seen and examined  Patient presents for follow-up regarding right distal femur periprosthetic fracture being treated conservatively with a knee brace  She reports improvement in pain  She is eager to come out of her brace  She has been nonweightbearing  X-ray today demonstrates stable alignment  Fracture line is still visible with minimal callus formation  Assessment and plan  Still nonweightbearing  Continue with knee immobilizer/knee brace  Follow-up in 6 weeks for re-evaluation and new x-rays

## 2018-10-10 NOTE — PROGRESS NOTES
Assessment/Plan:    Femoral fracture [facility])  Patient is seen and examined  Patient presents for follow-up regarding right distal femur periprosthetic fracture being treated conservatively with a knee brace  She reports improvement in pain  She is eager to come out of her brace  She has been nonweightbearing  X-ray today demonstrates stable alignment  Fracture line is still visible with minimal callus formation  Assessment and plan  Still nonweightbearing  Continue with knee immobilizer/knee brace  Follow-up in 6 weeks for re-evaluation and new x-rays  Problem List Items Addressed This Visit     Femoral fracture [facility])     Patient is seen and examined  Patient presents for follow-up regarding right distal femur periprosthetic fracture being treated conservatively with a knee brace  She reports improvement in pain  She is eager to come out of her brace  She has been nonweightbearing  X-ray today demonstrates stable alignment  Fracture line is still visible with minimal callus formation  Assessment and plan  Still nonweightbearing  Continue with knee immobilizer/knee brace  Follow-up in 6 weeks for re-evaluation and new x-rays  Other Visit Diagnoses     Acute pain of right knee    -  Primary    Relevant Orders    XR knee 1 or 2 vw right            Subjective:      Patient ID: Sarah Ring is a 80 y o  female  The patient presents with Closed fracture of distal end of right femur sustained 8/2/18 (10 weeks post injury)  She has been compliant with nonweight bearing status and has been wearing hinged knee brace to knee  She has discomfort with the brace  She denies any motor or sensory deficits in the affected extremity  She continues to have pain yet is improving            The following portions of the patient's history were reviewed and updated as appropriate: current medications, past family history, past medical history, past social history, past surgical history and problem list     Review of Systems   Constitutional: Negative for chills, fever and unexpected weight change  HENT: Negative for hearing loss, nosebleeds and sore throat  Eyes: Negative for pain, redness and visual disturbance  Respiratory: Negative for cough, shortness of breath and wheezing  Cardiovascular: Negative for chest pain, palpitations and leg swelling  Gastrointestinal: Negative for abdominal pain, nausea and vomiting  Genitourinary: Negative for dyspareunia, dysuria and frequency  Skin: Negative for rash and wound  Neurological: Negative for dizziness, numbness and headaches  Psychiatric/Behavioral: Negative for decreased concentration and suicidal ideas  The patient is not nervous/anxious  Objective:      /88   Pulse 98   Ht 5' 3" (1 6 m)   Wt 47 2 kg (104 lb)   BMI 18 42 kg/m²          Physical Exam   Constitutional: She is oriented to person, place, and time  She appears well-developed and well-nourished  HENT:   Head: Normocephalic  Eyes: Pupils are equal, round, and reactive to light  Conjunctivae and EOM are normal    Neck: Normal range of motion  Cardiovascular: Normal rate  Pulmonary/Chest: Effort normal    Neurological: She is alert and oriented to person, place, and time  Skin: Skin is warm and dry         right Knee exam  · Knee brace in place  · Skin closed  · 5/5  ankle df/pf, ehl/fhl motor functions  Extremity warm/well perfused    Scribe Attestation    I,:   Arcelia Rizvi am acting as a scribe while in the presence of the attending physician :        I,:   Dharmesh Dow MD personally performed the services described in this documentation    as scribed in my presence :

## 2018-10-15 DIAGNOSIS — E87.1 HYPONATREMIA: ICD-10-CM

## 2018-10-15 DIAGNOSIS — S72.401A CLOSED FRACTURE OF DISTAL END OF RIGHT FEMUR, UNSPECIFIED FRACTURE MORPHOLOGY, INITIAL ENCOUNTER (HCC): ICD-10-CM

## 2018-10-15 RX ORDER — SODIUM CHLORIDE 1000 MG
2 TABLET, SOLUBLE MISCELLANEOUS
Qty: 90 TABLET | Refills: 2 | Status: SHIPPED | OUTPATIENT
Start: 2018-10-15 | End: 2018-12-09 | Stop reason: SDUPTHER

## 2018-10-15 RX ORDER — SENNOSIDES 8.6 MG
2 TABLET ORAL
Qty: 120 EACH | Refills: 2 | Status: SHIPPED | OUTPATIENT
Start: 2018-10-15

## 2018-10-16 ENCOUNTER — APPOINTMENT (OUTPATIENT)
Dept: LAB | Facility: CLINIC | Age: 83
End: 2018-10-16
Payer: COMMERCIAL

## 2018-10-16 DIAGNOSIS — E87.1 HYPONATREMIA: ICD-10-CM

## 2018-10-16 DIAGNOSIS — I35.0 SEVERE AORTIC STENOSIS: ICD-10-CM

## 2018-10-16 DIAGNOSIS — I10 ESSENTIAL HYPERTENSION: ICD-10-CM

## 2018-10-16 DIAGNOSIS — N30.00 ACUTE CYSTITIS WITHOUT HEMATURIA: ICD-10-CM

## 2018-10-16 LAB
ANION GAP SERPL CALCULATED.3IONS-SCNC: 9 MMOL/L (ref 4–13)
BUN SERPL-MCNC: 10 MG/DL (ref 5–25)
CALCIUM SERPL-MCNC: 9.2 MG/DL (ref 8.3–10.1)
CHLORIDE SERPL-SCNC: 92 MMOL/L (ref 100–108)
CO2 SERPL-SCNC: 28 MMOL/L (ref 21–32)
CREAT SERPL-MCNC: 0.56 MG/DL (ref 0.6–1.3)
ERYTHROCYTE [DISTWIDTH] IN BLOOD BY AUTOMATED COUNT: 14.1 % (ref 11.6–15.1)
GFR SERPL CREATININE-BSD FRML MDRD: 82 ML/MIN/1.73SQ M
GLUCOSE SERPL-MCNC: 125 MG/DL (ref 65–140)
HCT VFR BLD AUTO: 32 % (ref 34.8–46.1)
HGB BLD-MCNC: 10.2 G/DL (ref 11.5–15.4)
MCH RBC QN AUTO: 27.3 PG (ref 26.8–34.3)
MCHC RBC AUTO-ENTMCNC: 31.9 G/DL (ref 31.4–37.4)
MCV RBC AUTO: 86 FL (ref 82–98)
PLATELET # BLD AUTO: 511 THOUSANDS/UL (ref 149–390)
PMV BLD AUTO: 10.2 FL (ref 8.9–12.7)
POTASSIUM SERPL-SCNC: 3.4 MMOL/L (ref 3.5–5.3)
RBC # BLD AUTO: 3.74 MILLION/UL (ref 3.81–5.12)
SODIUM SERPL-SCNC: 129 MMOL/L (ref 136–145)
WBC # BLD AUTO: 8.11 THOUSAND/UL (ref 4.31–10.16)

## 2018-10-16 PROCEDURE — 36415 COLL VENOUS BLD VENIPUNCTURE: CPT

## 2018-10-16 PROCEDURE — 85027 COMPLETE CBC AUTOMATED: CPT

## 2018-10-16 PROCEDURE — 80048 BASIC METABOLIC PNL TOTAL CA: CPT

## 2018-10-17 ENCOUNTER — TELEPHONE (OUTPATIENT)
Dept: INTERNAL MEDICINE CLINIC | Facility: CLINIC | Age: 83
End: 2018-10-17

## 2018-10-17 ENCOUNTER — OFFICE VISIT (OUTPATIENT)
Dept: INTERNAL MEDICINE CLINIC | Facility: CLINIC | Age: 83
End: 2018-10-17
Payer: COMMERCIAL

## 2018-10-17 ENCOUNTER — APPOINTMENT (OUTPATIENT)
Dept: LAB | Facility: CLINIC | Age: 83
End: 2018-10-17
Payer: COMMERCIAL

## 2018-10-17 VITALS — SYSTOLIC BLOOD PRESSURE: 102 MMHG | DIASTOLIC BLOOD PRESSURE: 68 MMHG | HEART RATE: 108 BPM

## 2018-10-17 DIAGNOSIS — N39.0 RECURRENT UTI: Primary | ICD-10-CM

## 2018-10-17 DIAGNOSIS — F02.80 LATE ONSET ALZHEIMER'S DISEASE WITHOUT BEHAVIORAL DISTURBANCE (HCC): ICD-10-CM

## 2018-10-17 DIAGNOSIS — F41.9 ANXIETY: ICD-10-CM

## 2018-10-17 DIAGNOSIS — S72.401A CLOSED FRACTURE OF DISTAL END OF RIGHT FEMUR, UNSPECIFIED FRACTURE MORPHOLOGY, INITIAL ENCOUNTER (HCC): ICD-10-CM

## 2018-10-17 DIAGNOSIS — Z23 ENCOUNTER FOR IMMUNIZATION: ICD-10-CM

## 2018-10-17 DIAGNOSIS — G30.1 LATE ONSET ALZHEIMER'S DISEASE WITHOUT BEHAVIORAL DISTURBANCE (HCC): ICD-10-CM

## 2018-10-17 PROCEDURE — G0008 ADMIN INFLUENZA VIRUS VAC: HCPCS

## 2018-10-17 PROCEDURE — 87086 URINE CULTURE/COLONY COUNT: CPT

## 2018-10-17 PROCEDURE — 99214 OFFICE O/P EST MOD 30 MIN: CPT | Performed by: INTERNAL MEDICINE

## 2018-10-17 PROCEDURE — 90662 IIV NO PRSV INCREASED AG IM: CPT

## 2018-10-17 RX ORDER — BUSPIRONE HYDROCHLORIDE 5 MG/1
5 TABLET ORAL 3 TIMES DAILY
Qty: 90 TABLET | Refills: 5 | Status: SHIPPED | OUTPATIENT
Start: 2018-10-17 | End: 2018-10-22 | Stop reason: SDUPTHER

## 2018-10-17 RX ORDER — METHENAMINE HIPPURATE 1000 MG/1
1 TABLET ORAL 2 TIMES DAILY WITH MEALS
Qty: 60 TABLET | Refills: 5 | Status: SHIPPED | OUTPATIENT
Start: 2018-10-17

## 2018-10-17 NOTE — PATIENT INSTRUCTIONS
Urinary Tract Infection in Women   AMBULATORY CARE:   A urinary tract infection (UTI)  is caused by bacteria that get inside your urinary tract  Most bacteria that enter your urinary tract come out when you urinate  If the bacteria stay in your urinary tract, you may get an infection  Your urinary tract includes your kidneys, ureters, bladder, and urethra  Urine is made in your kidneys, and it flows from the ureters to the bladder  Urine leaves the bladder through the urethra  A UTI is more common in your lower urinary tract, which includes your bladder and urethra  Common symptoms include the following:   · Urinating more often or waking from sleep to urinate    · Pain or burning when you urinate    · Pain or pressure in your lower abdomen     · Urine that smells bad    · Blood in your urine    · Leaking urine  Seek care immediately if:   · You are urinating very little or not at all  · You have a high fever with shaking chills  · You have side or back pain that gets worse  Contact your healthcare provider if:   · You have a fever  · You do not feel better after 2 days of taking antibiotics  · You are vomiting  · You have questions or concerns about your condition or care  Treatment for a UTI  may include medicines to treat a bacterial infection  You may also need medicines to decrease pain and burning, or decrease the urge to urinate often  Prevent a UTI:   · Empty your bladder often  Urinate and empty your bladder as soon as you feel the need  Do not hold your urine for long periods of time  · Wipe from front to back after you urinate or have a bowel movement  This will help prevent germs from getting into your urinary tract through your urethra  · Drink liquids as directed  Ask how much liquid to drink each day and which liquids are best for you  You may need to drink more liquids than usual to help flush out the bacteria  Do not drink alcohol, caffeine, or citrus juices  These can irritate your bladder and increase your symptoms  Your healthcare provider may recommend cranberry juice to help prevent a UTI  · Urinate after you have sex  This can help flush out bacteria passed during sex  · Do not douche or use feminine deodorants  These can change the chemical balance in your vagina  · Change sanitary pads or tampons often  This will help prevent germs from getting into your urinary tract  · Do pelvic muscle exercises often  Pelvic muscle exercises may help you start and stop urinating  Strong pelvic muscles may help you empty your bladder easier  Squeeze these muscles tightly for 5 seconds like you are trying to hold back urine  Then relax for 5 seconds  Gradually work up to squeezing for 10 seconds  Do 3 sets of 15 repetitions a day, or as directed  Follow up with your healthcare provider as directed:  Write down your questions so you remember to ask them during your visits  © 2017 [employer] Information is for End User's use only and may not be sold, redistributed or otherwise used for commercial purposes  All illustrations and images included in CareNotes® are the copyrighted property of [employer]  or Max Duckworth  The above information is an  only  It is not intended as medical advice for individual conditions or treatments  Talk to your doctor, nurse or pharmacist before following any medical regimen to see if it is safe and effective for you

## 2018-10-17 NOTE — TELEPHONE ENCOUNTER
Mitzi Blank from [facility] called  They had a referral from Dr Magdaleno Dillon for occupational and physical therapy    They do not service her area code  Please contact patient with another alternative

## 2018-10-17 NOTE — PROGRESS NOTES
INTERNAL MEDICINE FOLLOW-UP OFFICE VISIT  [facility]    NAME: Alesha Melo  AGE: 80 y o  SEX: female  : 1928     DATE: 10/17/2018     Assessment and Plan:     1  Recurrent UTI    Urine culture results reviewed  Will start her on Hiprex BID to try to prevent recurrence of UTIs  - methenamine hippurate (HIPREX) 1 g tablet; Take 1 tablet (1 g total) by mouth 2 (two) times a day with meals  Dispense: 60 tablet; Refill: 5    2  Anxiety    Discussed medication options  Can continue taking xanax for sleep  Will start buspirone to see if that helps with her anxiety  Sodium is still a little low so would like to stay away from SSRIs  - busPIRone (BUSPAR) 5 mg tablet; Take 1 tablet (5 mg total) by mouth 3 (three) times a day  Dispense: 90 tablet; Refill: 5    3  Closed fracture of distal end of right femur, unspecified fracture morphology, initial encounter ([facility] [address] )    Stable alignment on most recent x-ray  Remain non-weight bearing RLE per orthopedics  4  Encounter for immunization  - influenza vaccine, [id number], high-dose, PF 0 5 mL, for patients 72 yr+ (FLUZONE HIGH-DOSE)      Chief Complaint:     Chief Complaint   Patient presents with    Follow-up     UTI (last week)      History of Present Illness:     Patient with a history of right femoral fracture, likely dementia, anxiety, and recurrent UTI  Diagnosed with another UTI in [place]  Culture results reviewed and was given appropriate course of cipro for 14 days  Citrobactor grew in her urine  She has been more anxious family has noted  UTI really affects her cognition when she gets them  With right femur fracture, she has to be non-weight bearing RLE  Family is worried she will become weaker         The following portions of the patient's history were reviewed and updated as appropriate: allergies, current medications, past family history, past medical history, past social history, past surgical history and problem list      Review of Systems:     Review of Systems   Constitutional: Positive for fatigue  Negative for chills and fever  Respiratory: Negative  Cardiovascular: Negative  Gastrointestinal: Negative  Neurological: Positive for weakness  Psychiatric/Behavioral: Positive for behavioral problems and sleep disturbance  The patient is nervous/anxious  Problem List:     Patient Active Problem List   Diagnosis    Femoral fracture (HCC)    Severe aortic stenosis    Essential hypertension    Dysphagia    Macular degeneration    Idiopathic peripheral neuropathy    Glaucoma    Benign hypertensive heart disease without congestive heart failure      Objective:     /68 (BP Location: Left arm, Patient Position: Sitting, Cuff Size: Standard)   Pulse (!) 108     Physical Exam   Constitutional: She appears well-developed and well-nourished  No distress  Pulmonary/Chest: Effort normal and breath sounds normal  No respiratory distress  She has no wheezes  She has no rales  She exhibits no tenderness  Abdominal: Soft  Bowel sounds are normal  She exhibits no distension  There is no tenderness  There is no rebound and no guarding  Musculoskeletal: She exhibits no edema  Right knee brace in place   Neurological: She is alert  Generalized weakness   Skin: She is not diaphoretic  Vitals reviewed      Pertinent Laboratory/Diagnostic Studies:    Laboratory Results: I have personally reviewed the pertinent laboratory results/reports       Geovanni Sagastume,   MEDICAL [address]

## 2018-10-19 ENCOUNTER — TELEPHONE (OUTPATIENT)
Dept: INTERNAL MEDICINE CLINIC | Facility: CLINIC | Age: 83
End: 2018-10-19

## 2018-10-19 LAB — BACTERIA UR CULT: NORMAL

## 2018-10-19 NOTE — TELEPHONE ENCOUNTER
----- Message from Neil Guzman DO sent at 10/19/2018  9:58 AM EDT -----  Let family know repeat urine culture shows no evidence of UTI

## 2018-10-22 DIAGNOSIS — F41.9 ANXIETY: ICD-10-CM

## 2018-10-22 RX ORDER — BUSPIRONE HYDROCHLORIDE 5 MG/1
5 TABLET ORAL 3 TIMES DAILY
Qty: 270 TABLET | Refills: 0 | Status: SHIPPED | OUTPATIENT
Start: 2018-10-22 | End: 2018-12-10 | Stop reason: SDUPTHER

## 2018-10-22 NOTE — TELEPHONE ENCOUNTER
PT SON CALLED THEY NEED US TO SEND OVER OR CALL MICHEL [address] [phone] TO GIVE THEM A NEW SCRIPT FOR BUSPIRONE THE [facility] IS OUT OF IT

## 2018-10-24 NOTE — UTILIZATION REVIEW
Ashley Higgins,  This admission was previously submitted to [facility] and we received a fax stating no authorization was required because Medicare was primary  [facility] was also told by Leanna Caicedo that no authorization was required  Now the claim is being denied due to no authorization  Could you please review this case? [address] faxed clinicals on this patient 08/03/2018, 08/06/2018 and 08/10/2018  I created a new case  Auth# [id number]    Thank you,  Pam Mendes "Brigitte" Kat Hook  Supervisor, Utilization Review Assistants  [address] Utilization Review Department  [phone] office  [phone] fax  [email or web address]    Baron Hatch RN Registered Nurse Signed Case Management Date of Service: 8/3/2018 11:20 AM      Initial Clinical Review     Admission: Date/Time/Statement: 8/2/18 @ 2034            Orders Placed This Encounter   Procedures    Inpatient Admission       Standing Status:   Standing       Number of Occurrences:   1       Order Specific Question:   Admitting Physician       Answer:   Luciano Hogue [[id number]]       Order Specific Question:   Level of Care       Answer:   Med Surg [16]       Order Specific Question:   Estimated length of stay       Answer:   More than 2 Midnights       Order Specific Question:   Certification       Answer:   I certify that inpatient services are medically necessary for this patient for a duration of greater than two midnights  See H&P and MD Progress Notes for additional information about the patient's course of treatment          Date/Time/Mode of Arrival: 8/2 Transfer from [address] [facility] ED     Chief Complaint:  Knee pain     History of Illness: 80 y  o  female with a history of HTN, aortic stenosis, and depression/anxiety who presents with right knee pain s/p fall  Patient was actually at the [address] [place] [address] office when she fell, injuring her knee  Patient is a poor historian and is unsure how she fell but denies syncope   She normally uses a walker  She was found to have right knee periprosthetic knee fracture and was transferred to [facility] for ortho evaluation  Currently she reports knee pain and hand pain  Labs were also significant for hyponatremia      Patient reports diarrhea starting this morning but otherwise has been in her normal state of health  She was on antibiotics in July for UTI  Her family reports that her oral intake is good  No vomiting  She was recently started on sertraline four weeks ago and her family states this has really helped her mood  No prior history of hyponatremia      Has a history of aortic stenosis, which is severe per family      Vital Signs:   Vitals   Temperature Pulse Respirations Blood Pressure SpO2   08/02/18 1928 08/02/18 1928 08/02/18 1928 08/02/18 1928 08/02/18 1928   97 8 °F (36 6 °C) 87 19 136/63 95 %       Temp Source Heart Rate Source Patient Position - Orthostatic VS BP Location FiO2 (%)   08/02/18 1928 08/02/18 2318 08/02/18 1928 08/02/18 1928 --   Oral Monitor Lying Right arm         Pain Score           08/02/18 1953           6                Wt Readings from Last 1 Encounters:   08/02/18 53 1 kg (117 lb)         Vital Signs (abnormal): WNL     Abnormal Labs:     08/02/18 1111     Sodium 136 - 145 mmol/L 128          08/03/18 0439     WBC 4 31 - 10 16 Thousand/uL 16 19     RBC 3 81 - 5 12 Million/uL 3 58     Hemoglobin 11 5 - 15 4 g/dL 10 8     Hematocrit 34 8 - 46 1 % 32 3           Diagnostic Test Results: CT Head - 1   Limited examination due to patient motion    2   Moderate cerebral atrophy with chronic small vessel ischemic change        8/2 Xray Right Knee - Acute distal femoral periprosthetic fracture with mild angulation and small joint effusion      CT Right Knee - There is an oblique mildly displaced periprosthetic fracture of the distal medial femur with the fracture gap of about 7 mm      8/3 Repeat Xray Right Knee - Increased size of joint effusion   Periprosthetic femoral fracture redemonstrated         Past Medical/Surgical History: Active Ambulatory Problems     Diagnosis Date Noted    No Active Ambulatory Problems           Resolved Ambulatory Problems     Diagnosis Date Noted    No Resolved Ambulatory Problems      Past Medical History:   Diagnosis Date    Hypertension      Psychiatric disorder           Admitting Diagnosis: Oth fracture of shaft of unsp femur, init for clos fx ([facility] [address] ) [[id number] [id number]]     Age/Sex: 80 y o  female     Assessment/Plan:       * Femoral fracture (HCC)   Assessment & Plan     · Distal femoral periprosthetic fracture  · Ortho evaluation  · Pain control          Hyponatremia   Assessment & Plan     · No prior history of hyponatremia per family- note patient started on Sertraline 4 weeks ago  · Check sodium studies  · Possibly hypovolemia component given report of diarrhea- will give fluids and monitor sodium  · If sodium worsening or not improving, may need to d/c Sertraline  Hesitant to d/c medication at this time as family reports it has helped her tremendously          Hand pain, right   Assessment & Plan     Obtain x-ray          Hypertension   Assessment & Plan     · Would hold ARB perioperatively  · Continue Norvasc   Monitor BP          Diarrhea   Assessment & Plan     · Treated with antibiotics for UTI in July  · If recurring, check for C diff          Severe aortic stenosis   Assessment & Plan     · Most recent echo on our system from 2015 states moderate AS but family states she had recent echo which showed severe AS  · If surgery planned, likely needs cardiac clearance             VTE Prophylaxis: Enoxaparin (Lovenox)  / sequential compression device   Code Status: DNR/DNI  POLST: There is no POLST form on file for this patient (pre-hospital)  Discussion with family: Family at bedside     Anticipated Length of Stay: Farzad Mata will be admitted on an Inpatient basis with an anticipated length of stay of  > 2 midnights    Justification for Hospital Stay: Fracture, hyponatremia        Admission Orders:  NPO; Sips with meds  Echo  Orthopedic Surgery cons  Palliative Care cons  PT/OT eval and treat     Scheduled Meds:   Current Facility-Administered Medications:  acetaminophen 975 mg Oral Q8H [address]   ALPRAZolam 0 125 mg Oral TID   amLODIPine 10 mg Oral Daily   enoxaparin 40 mg Subcutaneous Daily   gabapentin 100 mg Oral HS   oxyCODONE 5 mg Oral 4x Daily   senna 1 tablet Oral HS   sertraline 50 mg Oral Daily      Continuous Infusions:   sodium chloride 100 mL/hr Last Rate: 100 mL/hr (08/03/18 2139)      PRN Meds:   calcium carbonate    HYDROmorphone Iv x2    Ondansetron  Oxycodone po x3     ----------------------------------------------------------------------------------------------------------     8/2 Orthopedic Surgery cons:  Assessment:  80 y  o female status post fall with right periprosthetic distal femur fracture     Plan:   · Non weight bearing right lower extremity in knee immobilizer  · To OR for ORIF of right periprosthetic distal femur fracture  · CT Scan R Knee for pre-operative planning  · Analgesics for pain  · Informed consent obtained  · Pre op labs  · Cardiology consult for clearance  · NPO at midnight     ---------------------------------------------------------------------------------------------------     8/3 Orthopedic progress notes:  Assessment:  80 y  o female status post fall with right periprosthetic distal femur fracture     Plan:   · Non weight bearing right lower extremity in knee immobilizer  · To OR for ORIF of right periprosthetic distal femur fracture  · CT Scan R Knee for pre-operative planning  · Analgesics for pain  · Informed consent obtained    · Pre op labs  · Cardiology consult for clearance  · NPO at midnight     -------------------------------------------------------------------------------------------------  8/3 Palliative Care cons:  Assessment:        Patient Active Problem List   Diagnosis    Femoral fracture ([address] )    Severe aortic stenosis    Hyponatremia    Diarrhea    Hypertension    Hand pain, right    Closed fracture of right distal femur (HCC)         Plan:  1  Agree with Tylenol 975 mg TID and ice to affected area  2  Start oxyIR 5 mg q6H ATC with strict hold parameters  3  Continue IV Dilaudid 0 2 mg but change to q2H PRN for breakthrough pain  4  Start Gabapentin 100 mg qHS  5  Bowel regimen to prevent OIC  6  Patient at risk for delirium given age and co-morbidities, recommend global delirium precautions as follows:              - Establishment of day/night cycle via lights during the day and blinds open   Please limit interruptions at night as medically appropriate               - Patient should be out of bed during the day as tolerated or medically indicated                - Provide glasses/hearing aids as apprioriate                 - Minimize deliriogenic meds as able                - Provide reorientation including date on board and visible clock                - Avoid restraints as able, frequent verbal reorientations or patient care sitter as appropriate               - Consider use of melatonin qHS for circadian rhythm maintenance  7  GOALS- family requesting all information before making final decision  They are unsure if they would want to pursue operative repair given her age and frailty  They re-iterate DNR/DNI status  Will plan to follow up after other consultants   In the meantime, will try to optimize her pain regimen for improved pain control          Mau Valdivia RN Registered Nurse Signed Case Management Date of Service: 8/6/2018  2:43 PM      Continued Stay Review     Date: 8/6     Vital Signs: /62 (BP Location: Right arm)   Pulse 80   Temp 97 9 °F (36 6 °C) (Oral)   Resp 18   Ht 5' 1" (1 549 m)   Wt 53 1 kg (117 lb)   SpO2 94%   BMI 22 11 kg/m²      Medications:   Scheduled Meds:   Current Facility-Administered Medications:  acetaminophen 975 mg Oral [facility] ALPRAZolam 0 125 mg Oral TID   amLODIPine 10 mg Oral Daily   docusate sodium 100 mg Oral BID   enoxaparin 40 mg Subcutaneous Daily   gabapentin 100 mg Oral HS   oxyCODONE 5 mg Oral 4x Daily   senna 1 tablet Oral HS   sertraline 50 mg Oral Daily   sodium chloride 1 g Oral TID With Meals      Continuous Infusions:    PRN Meds: calcium carbonate    HYDROmorphone    ondansetron     Abnormal Labs/Diagnostic Results:      Age/Sex: 80 y o  female      Assessment/Plan:   8/6 Palliative & Supportive Care progress notes:       Goals of care, counseling/discussion   Assessment & Plan     - Level 3  - awaiting PT/OT recommendations           Pain   Assessment & Plan     - continue oxycodone 5 mg q4H ATC with strict hold parameters  - continue gabapentin 100 mg qHS  - continue Tylenol 975 mg PO TID  - continue Lidoderm patch and ice packs  - D/C IV Dilaudid and change to oxyIR for PRN use- PLEASE ADMINISTER 30 MINUTES PRIOR TO PT/OT  - bowel regimen to prevent OIC          * Femoral fracture ([facility] [address] )   Assessment & Plan     - plan for non-operative management at this time  - PT/OT evaluation pending   [address]: Pending PT/OT eval and treat      Thank you,  [facility]  Utilization Review Department  Phone: [phone]; Fax [phone]  ATTENTION: Please call with any questions or concerns to [phone]  and carefully follow the prompts so that you are directed to the right person  Send all requests for admission clinical reviews, approved or denied determinations and any other requests to fax [phone]   All voicemails are confidential

## 2018-11-05 ENCOUNTER — APPOINTMENT (OUTPATIENT)
Dept: LAB | Facility: CLINIC | Age: 83
End: 2018-11-05
Payer: COMMERCIAL

## 2018-11-05 ENCOUNTER — OFFICE VISIT (OUTPATIENT)
Dept: INTERNAL MEDICINE CLINIC | Facility: CLINIC | Age: 83
End: 2018-11-05
Payer: COMMERCIAL

## 2018-11-05 VITALS — OXYGEN SATURATION: 98 % | DIASTOLIC BLOOD PRESSURE: 78 MMHG | HEART RATE: 98 BPM | SYSTOLIC BLOOD PRESSURE: 122 MMHG

## 2018-11-05 DIAGNOSIS — E87.1 HYPONATREMIA: ICD-10-CM

## 2018-11-05 DIAGNOSIS — I11.9 BENIGN HYPERTENSIVE HEART DISEASE WITHOUT CONGESTIVE HEART FAILURE: Primary | ICD-10-CM

## 2018-11-05 DIAGNOSIS — S72.401D CLOSED FRACTURE OF DISTAL END OF RIGHT FEMUR WITH ROUTINE HEALING, UNSPECIFIED FRACTURE MORPHOLOGY, SUBSEQUENT ENCOUNTER: ICD-10-CM

## 2018-11-05 DIAGNOSIS — F41.9 ANXIETY: ICD-10-CM

## 2018-11-05 PROBLEM — F03.90 DEMENTIA (HCC): Status: ACTIVE | Noted: 2018-11-05

## 2018-11-05 LAB
ANION GAP SERPL CALCULATED.3IONS-SCNC: 7 MMOL/L (ref 4–13)
BUN SERPL-MCNC: 13 MG/DL (ref 5–25)
CALCIUM SERPL-MCNC: 9.5 MG/DL (ref 8.3–10.1)
CHLORIDE SERPL-SCNC: 93 MMOL/L (ref 100–108)
CO2 SERPL-SCNC: 29 MMOL/L (ref 21–32)
CREAT SERPL-MCNC: 0.43 MG/DL (ref 0.6–1.3)
GFR SERPL CREATININE-BSD FRML MDRD: 90 ML/MIN/1.73SQ M
GLUCOSE P FAST SERPL-MCNC: 76 MG/DL (ref 65–99)
POTASSIUM SERPL-SCNC: 3.3 MMOL/L (ref 3.5–5.3)
SODIUM SERPL-SCNC: 129 MMOL/L (ref 136–145)

## 2018-11-05 PROCEDURE — 80048 BASIC METABOLIC PNL TOTAL CA: CPT

## 2018-11-05 PROCEDURE — 36415 COLL VENOUS BLD VENIPUNCTURE: CPT

## 2018-11-05 PROCEDURE — 1036F TOBACCO NON-USER: CPT | Performed by: INTERNAL MEDICINE

## 2018-11-05 PROCEDURE — 99214 OFFICE O/P EST MOD 30 MIN: CPT | Performed by: INTERNAL MEDICINE

## 2018-11-05 NOTE — PROGRESS NOTES
INTERNAL MEDICINE FOLLOW-UP OFFICE VISIT  [facility] - [facility]    NAME: Pablo Reynolds  AGE: 80 y o  SEX: female  : 1928     DATE: 2018     Assessment and Plan:     1  Benign hypertensive heart disease without congestive heart failure    Blood pressure well controlled  Continue current treatment plan  2  Anxiety    Continue xanax as prescribed  Discussed side effects with medication  Has been on medication for years  3  Hyponatremia    Continue salt tablets  Check BMP  - Basic metabolic panel; Future    4  Closed fracture of distal end of right femur with routine healing, unspecified fracture morphology, subsequent encounter    Non-weight bearing RLE  Follow-up with orthopedics as scheduled  Return in about 4 months (around 3/5/2019) for Follow-up, JEREMIAH  Chief Complaint:     Chief Complaint   Patient presents with    Follow-up     2 month      History of Present Illness:     Patient presents for routine follow-up  Remains non-weight bearing RLE after femoral fracture  Has follow-up with orthopedics coming up to see if cast can be removed  Last sodium was 129  She is taking sodium tablets as prescribed  Family has no new concerns  She continues to be fairly anxious, but does well when family is around  Gets more anxious if she is left alone for 5 minutes  Taking xanax to help with sleep and has been using that for years  No new UTI symptoms  The following portions of the patient's history were reviewed and updated as appropriate: allergies, current medications, past family history, past medical history, past social history, past surgical history and problem list      Review of Systems:     Review of Systems   Constitutional: Negative for activity change, appetite change and fatigue  Respiratory: Negative for apnea, cough, chest tightness, shortness of breath and wheezing      Cardiovascular: Negative for chest pain, palpitations and leg swelling  Gastrointestinal: Negative for abdominal distention, abdominal pain, blood in stool, constipation, diarrhea, nausea and vomiting  Musculoskeletal: Positive for arthralgias and gait problem  Negative for back pain  Skin: Negative for rash and wound  Neurological: Positive for weakness  Negative for dizziness, facial asymmetry, light-headedness, numbness and headaches  Psychiatric/Behavioral: Positive for behavioral problems  Negative for confusion, hallucinations, sleep disturbance and suicidal ideas  The patient is nervous/anxious  Problem List:     Patient Active Problem List   Diagnosis    Femoral fracture (HCC)    Severe aortic stenosis    Essential hypertension    Dysphagia    Macular degeneration    Idiopathic peripheral neuropathy    Glaucoma    Benign hypertensive heart disease without congestive heart failure    Dementia      Objective:     /78 (BP Location: Left arm, Patient Position: Sitting, Cuff Size: Standard)   Pulse 98   SpO2 98%     Physical Exam   Constitutional: She appears well-developed and well-nourished  No distress  Eyes: Right eye exhibits no discharge  Left eye exhibits no discharge  No scleral icterus  Cardiovascular: Normal rate and regular rhythm  Murmur heard  Pulmonary/Chest: Breath sounds normal  No respiratory distress  She has no wheezes  She has no rales  Abdominal: Soft  Bowel sounds are normal  She exhibits no distension and no mass  There is no tenderness  There is no rebound and no guarding  No hernia  Musculoskeletal: She exhibits no edema  Neurological: She is alert  Skin: She is not diaphoretic       Pertinent Laboratory/Diagnostic Studies:    Laboratory Results: I have personally reviewed the pertinent laboratory results/reports     Chemistry Profile:   Results from last 6 Months  Lab Units 10/16/18  1020 09/18/18  1043   POTASSIUM mmol/L 3 4* 3 7   CHLORIDE mmol/L 92* 92*   CO2 mmol/L 28 29   BUN mg/dL 10 6 CREATININE mg/dL 0 56* 0 41*   GLUCOSE RANDOM mg/dL 125 105   CALCIUM mg/dL 9 2 9 0   MAGNESIUM mg/dL  --  1 3*   AST U/L  --  19   ALT U/L  --  15   ALK PHOS U/L  --  80   EGFR ml/min/1 73sq m 82 91     [facility],   MEDICAL [address]

## 2018-11-05 NOTE — PATIENT INSTRUCTIONS
Anxiety   AMBULATORY CARE:   Anxiety  is a condition that causes you to feel extremely worried or nervous  The feelings are so strong that they can cause problems with your daily activities or sleep  Anxiety may be triggered by something you fear, or it may happen without a cause  Family or work stress, smoking, caffeine, and alcohol can increase your risk for anxiety  Certain medicines or health conditions can also increase your risk  Anxiety can become a long-term condition if it is not managed or treated  Common signs and symptoms that may occur with anxiety:   · Fatigue or muscle tightness     · Shaking, restlessness, or irritability     · Problems focusing     · Trouble sleeping     · Feeling jumpy, easily startled, or dizzy     · Rapid heartbeat or shortness of breath  Call 911 if:   · You have chest pain, tightness, or heaviness that may spread to your shoulders, arms, jaw, neck, or back  · You feel like hurting yourself or someone else  Contact your healthcare provider if:   · Your symptoms get worse or do not get better with treatment  · You think your medicine may be causing side effects  · Your anxiety keeps you from doing your regular daily activities  · You have new symptoms since your last visit  · You have questions or concerns about your condition or care  Treatment for anxiety  may include medicines to help you feel calm and relaxed, and decrease your symptoms  Medicines are usually given together with therapy or other treatments  Manage anxiety:   · Talk to someone about your anxiety  Your healthcare provider may suggest counseling  Cognitive behavioral therapy can help you understand and change how you react to events that trigger your symptoms  You might feel more comfortable talking with a friend or family member about your anxiety  Choose someone you know will be supportive and encouraging  · Find ways to relax    Activities such as exercise, meditation, or listening to music can help you relax  Spend time with friends, or do things you enjoy  · Practice deep breathing  Deep breathing can help you relax when you feel anxious  Focus on taking slow, deep breaths several times a day, or during an anxiety attack  Breathe in through your nose and out through your mouth  · Create a regular sleep routine  Regular sleep can help you feel calmer during the day  Go to sleep and wake up at the same times every day  Do not watch television or use the computer right before bed  Your room should be comfortable, dark, and quiet  · Eat a variety of healthy foods  Healthy foods include fruits, vegetables, low-fat dairy products, lean meats, fish, whole-grain breads, and cooked beans  Healthy foods can help you feel less anxious and have more energy  · Exercise regularly  Exercise can increase your energy level  Exercise may also lift your mood and help you sleep better  Your healthcare provider can help you create an exercise plan  · Do not smoke  Nicotine and other chemicals in cigarettes and cigars can increase anxiety  Ask your healthcare provider for information if you currently smoke and need help to quit  E-cigarettes or smokeless tobacco still contain nicotine  Talk to your healthcare provider before you use these products  · Do not have caffeine  Caffeine can make your symptoms worse  Do not have foods or drinks that are meant to increase your energy level  · Limit or do not drink alcohol  Ask your healthcare provider if alcohol is safe for you  You may not be able to drink alcohol if you take certain anxiety or depression medicines  Limit alcohol to 1 drink per day if you are a woman  Limit alcohol to 2 drinks per day if you are a man  A drink of alcohol is 12 ounces of beer, 5 ounces of wine, or 1½ ounces of liquor  · Do not use drugs  Drugs can make your anxiety worse  It can also make anxiety hard to manage   Talk to your healthcare provider if you use drugs and want help to quit  Follow up with your healthcare provider as directed:  Write down your questions so you remember to ask them during your visits  © 2017 [employer] Information is for End User's use only and may not be sold, redistributed or otherwise used for commercial purposes  All illustrations and images included in CareNotes® are the copyrighted property of A [employer] M [employer] Inc  or [employer]  The above information is an  only  It is not intended as medical advice for individual conditions or treatments  Talk to your doctor, nurse or pharmacist before following any medical regimen to see if it is safe and effective for you

## 2018-11-21 ENCOUNTER — APPOINTMENT (OUTPATIENT)
Dept: RADIOLOGY | Facility: CLINIC | Age: 83
End: 2018-11-21
Payer: COMMERCIAL

## 2018-11-21 ENCOUNTER — OFFICE VISIT (OUTPATIENT)
Dept: OBGYN CLINIC | Facility: CLINIC | Age: 83
End: 2018-11-21
Payer: COMMERCIAL

## 2018-11-21 ENCOUNTER — HOSPITAL ENCOUNTER (OUTPATIENT)
Dept: RADIOLOGY | Facility: HOSPITAL | Age: 83
Discharge: HOME | End: 2018-11-21
Attending: ORTHOPAEDIC SURGERY
Payer: COMMERCIAL

## 2018-11-21 VITALS
BODY MASS INDEX: 18.43 KG/M2 | DIASTOLIC BLOOD PRESSURE: 84 MMHG | WEIGHT: 104 LBS | HEART RATE: 76 BPM | SYSTOLIC BLOOD PRESSURE: 129 MMHG | HEIGHT: 63 IN

## 2018-11-21 DIAGNOSIS — M25.561 ACUTE PAIN OF RIGHT KNEE: ICD-10-CM

## 2018-11-21 DIAGNOSIS — M25.561 ACUTE PAIN OF RIGHT KNEE: Primary | ICD-10-CM

## 2018-11-21 PROCEDURE — 99213 OFFICE O/P EST LOW 20 MIN: CPT | Performed by: ORTHOPAEDIC SURGERY

## 2018-11-21 PROCEDURE — 73560 X-RAY EXAM OF KNEE 1 OR 2: CPT

## 2018-11-21 NOTE — PROGRESS NOTES
ASSESSMENT/PLAN:    Assessment:   89F 3 5 months  Since right distal femur periprosthetic fracture treated non operatively  Plan:   - Weightbearing as tolerated right lower extremity  - physical therapy  - follow-up in 1 months for re-evaluation and x-ray right knee        _____________________________________________________  CHIEF COMPLAINT:  No chief complaint on file  SUBJECTIVE:  Urban Au is a 80y o  year old female who presents  3 half months since right distal femur periprosthetic fracture  Patient has been treated in a closed fashion with a brace  Her pain is continued to improve each visit with closed treatment  No c/o of numbness/tingling    PAST MEDICAL HISTORY:  Past Medical History:   Diagnosis Date    Benign hypertensive heart disease without congestive heart failure 2018    Broken leg     Dementia     Dysphagia 2018    Essential hypertension 2018    Femoral fracture ([facility] [address] ) 2018    Glaucoma 2015    Hypertension     Idiopathic peripheral neuropathy 2018    Macular degeneration 2015    Sepsis ([facility] [address] ) 2018    Severe aortic stenosis 2018    UTI (urinary tract infection) 2018       PAST SURGICAL HISTORY:  Past Surgical History:   Procedure Laterality Date     SECTION      CHOLECYSTECTOMY      REPLACEMENT TOTAL KNEE         FAMILY HISTORY:  No family history on file      SOCIAL HISTORY:  Social History   Substance Use Topics    Smoking status: Never Smoker    Smokeless tobacco: Never Used    Alcohol use 0 6 oz/week     1 Standard drinks or equivalent per week      Comment: "one gin a day"- occasionally       MEDICATIONS:    Current Outpatient Prescriptions:     acetaminophen (TYLENOL) 325 mg tablet, Take 3 tablets (975 mg total) by mouth 3 (three) times a day (Patient taking differently: Take 975 mg by mouth as needed  ), Disp: 30 tablet, Rfl: 0    ALPRAZolam (XANAX) 0 5 mg tablet, Take 1 tablet (0 5 mg total) by mouth daily at bedtime as needed for anxiety for up to 10 days (Patient taking differently: Take 0 5 mg by mouth daily at bedtime  ), Disp: 10 tablet, Rfl: 0    busPIRone (BUSPAR) 5 mg tablet, Take 1 tablet (5 mg total) by mouth 3 (three) times a day for 90 days, Disp: 270 tablet, Rfl: 0    losartan (COZAAR) 100 MG tablet, Take 1 tablet (100 mg total) by mouth daily, Disp: 90 tablet, Rfl: 1    methenamine hippurate (HIPREX) 1 g tablet, Take 1 tablet (1 g total) by mouth 2 (two) times a day with meals, Disp: 60 tablet, Rfl: 5    oxyCODONE (ROXICODONE) 5 mg immediate release tablet, Take 1 tablet (5 mg total) by mouth every 6 (six) hours as needed for moderate pain or severe pain Max Daily Amount: 20 mg, Disp: 20 tablet, Rfl: 0    polyethylene glycol (MIRALAX) 17 g packet, Take 17 g by mouth daily (Patient taking differently: Take 17 g by mouth as needed  ), Disp: 14 each, Rfl: 0    senna (SENOKOT) 8 6 mg, Take 2 tablets (17 2 mg total) by mouth daily at bedtime, Disp: 120 each, Rfl: 2    sodium chloride 1 g tablet, Take 2 tablets (2 g total) by mouth 3 (three) times a day with meals, Disp: 90 tablet, Rfl: 2    ALLERGIES:  No Known Allergies    REVIEW OF SYSTEMS:  Pertinent items are noted in HPI  A comprehensive review of systems was negative      LABS:  HgA1c:   Lab Results   Component Value Date    HGBA1C 5 6 09/23/2014     BMP:   Lab Results   Component Value Date    GLUCOSE 87 10/20/2015    CALCIUM 9 5 11/05/2018     (L) 10/20/2015    K 3 3 (L) 11/05/2018    CO2 29 11/05/2018    CL 93 (L) 11/05/2018    BUN 13 11/05/2018    CREATININE 0 43 (L) 11/05/2018       _____________________________________________________  PHYSICAL EXAMINATION:  General: well developed and well nourished, alert, oriented times 3 and appears comfortable  Psychiatric: Normal  HEENT: Trachea Midline, No torticollis  Cardiovascular: No discernable arrhythmia  Pulmonary: No wheezing or stridor  Skin: No masses, erthema, lacerations, fluctation, ulcerations  Neurovascular: Sensation Intact to the Median, Ulnar, Radial Nerve, Motor Intact to the Median, Ulnar, Radial Nerve and Pulses Intact    MUSCULOSKELETAL EXAMINATION:  RLE:  SILT s/sp/dp/s, toes x5 warm and pink, +FHL/EHL, +AnkleP-Dflexion, knee  Extension and flexion grossly intact without pain          _____________________________________________________  STUDIES REVIEWED:   right knee x-rays today:   Stable fracture pattern compared to previous x-rays, zak-fracture callus noted      PROCEDURES PERFORMED:  Procedures  No Procedures performed today

## 2018-12-03 ENCOUNTER — TELEPHONE (OUTPATIENT)
Dept: INTERNAL MEDICINE CLINIC | Facility: CLINIC | Age: 83
End: 2018-12-03

## 2018-12-03 NOTE — TELEPHONE ENCOUNTER
HE IS A PHYISCAL THERAPIST WANTED TO LET THE DR HE PT HAS AN ORDER FOR AN OUT PT EVAL FOR THIS Thursday  PT BROKE HER LEG 4 MONTHS AGO AND HE WANTED TO SPEAK WITH  HE FEELS THAT THE PT IS NOT READY FOR OUT PT THERAPY HE WANTS TO EXTEND THE HOME HEALTH FOR A FEW MORE WEEKS SINCE SHE HAS A HARD TIME STANDING ON HER LEG   WANTED TO SPEAK BEFORE HE TELLS THE PT FAMILY

## 2018-12-03 NOTE — TELEPHONE ENCOUNTER
I am fine with it being extended but should come from her orthopedics office  They are managing this and last saw her on 11/21/2018

## 2018-12-05 ENCOUNTER — EVALUATION (OUTPATIENT)
Dept: PHYSICAL THERAPY | Facility: CLINIC | Age: 83
End: 2018-12-05
Payer: COMMERCIAL

## 2018-12-05 DIAGNOSIS — M25.561 ACUTE PAIN OF RIGHT KNEE: Primary | ICD-10-CM

## 2018-12-05 PROCEDURE — G8978 MOBILITY CURRENT STATUS: HCPCS | Performed by: PHYSICAL THERAPIST

## 2018-12-05 PROCEDURE — G8979 MOBILITY GOAL STATUS: HCPCS | Performed by: PHYSICAL THERAPIST

## 2018-12-05 PROCEDURE — G8980 MOBILITY D/C STATUS: HCPCS | Performed by: PHYSICAL THERAPIST

## 2018-12-05 PROCEDURE — 97163 PT EVAL HIGH COMPLEX 45 MIN: CPT | Performed by: PHYSICAL THERAPIST

## 2018-12-05 NOTE — PROGRESS NOTES
PT Evaluation  and PT Discharge    Today's date: 2018  Patient name: Cesar Morgan  : 1928  MRN: [id number]  Referring provider: Lendel Carrel, MD  Dx:   Encounter Diagnosis     ICD-10-CM    1  Acute pain of right knee M25 561 Ambulatory referral to Physical Therapy         Patient did not return to PT, as a result, DC from PT    Assessment  Assessment details: Marly Jarvis is a 80year old female presenting to outpatient physical therapy with chief complaints of R knee pain, gait dysfunction, and weakness  She currently demonstrates dependence with sit to stand transfers and also demonstrates weakness throughout the lower extremity  Gait assessment was not able to be performed today secondary to not having her RW  Functionally, she has difficulty walking, standing, transferring, dressing, bathing, and completing recreational activities  At this time, it is recommended that the patient have continued home health physical therapy secondary to her lack of mobility and dependency with transfers  Patient and family are in agreement with POC  Impairments: abnormal gait, abnormal muscle firing, abnormal or restricted ROM, abnormal movement, difficulty understanding, impaired balance, impaired physical strength, lacks appropriate home exercise program, pain with function, safety issue and weight-bearing intolerance    Goals  Patient will get set up with home health physical therapy  Plan  Plan details: DC outpatient PT, patient referred to home physical therapy    Referring physician contacted via [employer] with recommendation  Subjective Evaluation    History of Present Illness  Mechanism of injury: Patient reports that while ambulating she fell onto her knee and fractured her femur  She does have a history of a TKA on that specific leg but she was not a surgical candidate  DOI was 18    She has been immobilized for the last 4 months and has not been allowed to bear any weight through the area  She has had home physical therapy and occupational therapy which finished yesterday  Prior to the injury, she was able to ambulate with a RW or a SPC  She states that currently she is having difficulty with standing  She does live at home with family, first floor set up  There is a transfer chair in the bathroom to help with bathing activities  As far as pain levels are concerned, if she sits and remains relatively inactive, she does not have pain  As she begins to bear weight she does experience pain  Knee flexion movements are the most painful for her  Denies numbness, tingling, and burning in the leg  Objective     Neurological Testing     Sensation     Knee   Left Knee   Intact: light touch    Right Knee   Intact: light touch     Active Range of Motion     Right Knee   Flexion: 75 degrees   Extension: 10 degrees     Strength/Myotome Testing     Right Knee   Flexion: 3  Extension: 3-    Ambulation     Comments   Patient did not have RW with her today, unable to complete gait assessment    Functional Assessment     Comments  Sit to stand transfer - Dependent with high fear of falling  Stand pivot transfer - Dependent with high levels of cueing to try to pick feet up for pivot  Bed mobility - Min A x1 from sit to supine    Supine to sit - Min A x1      Flowsheet Rows      Most Recent Value   PT/OT G-Codes   Current Score  10   Projected Score  37          Precautions: R femur fracture    Daily Treatment Diary     Manual                                                                                   Exercise Diary                                                                                                                                                                                                                                                                                      Modalities

## 2018-12-06 ENCOUNTER — TELEPHONE (OUTPATIENT)
Dept: OBGYN CLINIC | Facility: HOSPITAL | Age: 83
End: 2018-12-06

## 2018-12-06 NOTE — TELEPHONE ENCOUNTER
I called [address] [facility] & let them know that the patient had tried the outpatient PT and was unable to do so  I asked that they continue doing the home healthy & PT with her and she said that they have an aide that will be going to see the patient again  Thank you!

## 2018-12-06 NOTE — TELEPHONE ENCOUNTER
Johnathan Baker  [phone]   Dr Glenys Bauer    Patients family was told that she is not ready for Outpatient Therapy and needs to go back to [address] [place] [address]   Please advise,  DL[facility]  [phone]

## 2018-12-07 ENCOUNTER — TELEPHONE (OUTPATIENT)
Dept: INTERNAL MEDICINE CLINIC | Facility: CLINIC | Age: 83
End: 2018-12-07

## 2018-12-07 DIAGNOSIS — F41.9 ANXIETY: ICD-10-CM

## 2018-12-07 NOTE — TELEPHONE ENCOUNTER
Pts son called asking if Dr Lori Alvarado would increase the pts BUSPAR  He states she is doing very well with this dosage, however, he thinks she would do better with a higher dosage  Please call Maya Welch to discuss [phone]  I did let him know that Dr Lori Alvarado is out until Monday

## 2018-12-07 NOTE — TELEPHONE ENCOUNTER
CALLED SON AND ASKED WHY WOULD LIKE DOSE INCREASE, STATED HAS IMPROVED ON CURRENT DOSE And still has high degree of anxiety and thinks increase may help this

## 2018-12-09 DIAGNOSIS — E87.1 HYPONATREMIA: ICD-10-CM

## 2018-12-09 DIAGNOSIS — I10 ESSENTIAL HYPERTENSION: ICD-10-CM

## 2018-12-10 RX ORDER — SODIUM CHLORIDE 1000 MG
2 TABLET, SOLUBLE MISCELLANEOUS
Qty: 90 TABLET | Refills: 2 | Status: SHIPPED | OUTPATIENT
Start: 2018-12-10

## 2018-12-10 RX ORDER — LOSARTAN POTASSIUM 100 MG/1
TABLET ORAL
Qty: 90 TABLET | Refills: 2 | Status: SHIPPED | OUTPATIENT
Start: 2018-12-10

## 2018-12-10 RX ORDER — BUSPIRONE HYDROCHLORIDE 10 MG/1
10 TABLET ORAL 3 TIMES DAILY
Qty: 270 TABLET | Refills: 0 | Status: SHIPPED | OUTPATIENT
Start: 2018-12-10 | End: 2019-03-10

## 2018-12-10 NOTE — TELEPHONE ENCOUNTER
I think increasing her dose would be appropriate  I sent new prescription for 10mg 3 times daily to [facility] pharmacy

## 2018-12-11 ENCOUNTER — TELEPHONE (OUTPATIENT)
Dept: INTERNAL MEDICINE CLINIC | Facility: CLINIC | Age: 83
End: 2018-12-11

## 2018-12-11 DIAGNOSIS — Z23 ENCOUNTER FOR IMMUNIZATION: Primary | ICD-10-CM

## 2018-12-11 NOTE — TELEPHONE ENCOUNTER
PT  IS  DOING  RESPITE  CARE  ALSO  NEED  PNEMONIA  QUEENIE YUN   PUT  AN  ORDER  IN SYSTEM   COULD LIKE  TO  COME  TOMORROW   CALL [facility]

## 2018-12-14 ENCOUNTER — TELEPHONE (OUTPATIENT)
Dept: INTERNAL MEDICINE CLINIC | Facility: CLINIC | Age: 83
End: 2018-12-14

## 2018-12-19 ENCOUNTER — CLINICAL SUPPORT (OUTPATIENT)
Dept: INTERNAL MEDICINE CLINIC | Facility: CLINIC | Age: 83
End: 2018-12-19
Payer: COMMERCIAL

## 2018-12-19 DIAGNOSIS — Z23 ENCOUNTER FOR IMMUNIZATION: Primary | ICD-10-CM

## 2018-12-19 PROCEDURE — 90670 PCV13 VACCINE IM: CPT

## 2018-12-19 PROCEDURE — G0009 ADMIN PNEUMOCOCCAL VACCINE: HCPCS

## 2018-12-19 PROCEDURE — 4040F PNEUMOC VAC/ADMIN/RCVD: CPT

## 2018-12-26 ENCOUNTER — APPOINTMENT (OUTPATIENT)
Dept: LAB | Facility: CLINIC | Age: 83
End: 2018-12-26
Payer: COMMERCIAL

## 2018-12-26 ENCOUNTER — TELEPHONE (OUTPATIENT)
Dept: INTERNAL MEDICINE CLINIC | Facility: CLINIC | Age: 83
End: 2018-12-26

## 2018-12-26 DIAGNOSIS — N39.0 RECURRENT UTI: Primary | ICD-10-CM

## 2018-12-26 DIAGNOSIS — N39.0 RECURRENT UTI: ICD-10-CM

## 2018-12-26 PROCEDURE — 87086 URINE CULTURE/COLONY COUNT: CPT

## 2018-12-26 NOTE — TELEPHONE ENCOUNTER
Thinks she has another UTI   Would like an order for a urine test  Please call son Omaira Yanique) at [phone]

## 2018-12-27 ENCOUNTER — TELEPHONE (OUTPATIENT)
Dept: INTERNAL MEDICINE CLINIC | Facility: CLINIC | Age: 83
End: 2018-12-27

## 2018-12-27 LAB — BACTERIA UR CULT: NORMAL

## 2018-12-27 NOTE — TELEPHONE ENCOUNTER
----- Message from Yeyo Luna DO sent at 12/27/2018  8:42 AM EST -----  Let son/family know that urine culture came back with no evidence of UTI

## 2019-01-03 ENCOUNTER — TELEPHONE (OUTPATIENT)
Dept: INTERNAL MEDICINE CLINIC | Facility: CLINIC | Age: 84
End: 2019-01-03

## 2019-01-03 DIAGNOSIS — F41.9 ANXIETY: ICD-10-CM

## 2019-01-03 RX ORDER — ALPRAZOLAM 0.5 MG/1
0.5 TABLET ORAL
Qty: 30 TABLET | Refills: 0 | Status: SHIPPED | OUTPATIENT
Start: 2019-01-03 | End: 2019-02-02

## 2019-01-03 NOTE — TELEPHONE ENCOUNTER
Patients son called, [place] are [place] in [place], the pharmacy only gave the patient [address]    They are curious if Dr Bee will give her a month supply before they establish with a new PCP in [place]    Dr Bee has not perscribed patient [facility] [facility]    Please call Rosanna Mukherjee (son) with any questions # [phone]

## 2019-01-15 ENCOUNTER — TELEPHONE (OUTPATIENT)
Dept: INTERNAL MEDICINE CLINIC | Facility: CLINIC | Age: 84
End: 2019-01-15

## 2019-01-15 NOTE — TELEPHONE ENCOUNTER
Kannan Hammer was calling from [facility] they are admitting pt there for restrict  care and they need us to fax over a Do Not Resuscitate  form or consent also they need to know if pt is allergic to anything they didn't received those two pieces of info they got everything  Fax [phone]

## 2019-03-26 ENCOUNTER — TELEPHONE (OUTPATIENT)
Dept: INTERNAL MEDICINE CLINIC | Facility: CLINIC | Age: 84
End: 2019-03-26

## 2019-09-03 ENCOUNTER — TELEPHONE (OUTPATIENT)
Dept: GERIATRICS | Age: 84
End: 2019-09-03

## 2019-09-03 NOTE — TELEPHONE ENCOUNTER
[facility] called looking for medical records for the order for a knee brace  Fax # [phone]    Tried to call phone number given no answer    Will fax note to fax # stating no records from Dr [place]

## 2021-03-02 NOTE — ASSESSMENT & PLAN NOTE
Obtain x-ray: No acute osseous abnormality  [Vulvar Biopsy] : Vulvar Biopsy [Time out performed] : Pre-procedure time out performed.  Patient's name, date of birth and procedure confirmed. [Consent Obtained] : Consent obtained [Size of Biopsy Taken: ___ (mm)] : [unfilled]Umm [Local Anesthesia] : local anesthesia [____ Lidocaine w/o Epi] : ~VmL lidocaine without epinephrine [Sent to Pathology] : placed in buffered formalin and sent for pathology [Punch] : punch biopsy [John's] : John's solution [Tolerated Well] : the patient tolerated the procedure well [No Complications] : there were no complications [de-identified] : Posterior forchette, dark flat area with irregular boarders. [de-identified] : less then 1mL [de-identified] : 3mm [de-identified] : excellent hemostasis noted

## 2021-09-14 NOTE — TELEPHONE ENCOUNTER
Daughter in law  Jonny asking if we had results yet from the urine sample they dropped off this morning  It's still in progress, but I told her I'd send the message back and we'd be in touch with her, or her   Cosme, who's listed as the emergency contact, when they come back       Robby HerreraCape Fear Valley Medical Centerperri [address] [place] [address]#[phone]
FORWARDING MSG  TO DR Kamille Harding
FWD to Dr Nella Gaucher
Results are not back yet as of this morning 
When we get the results we will let them know  Takes a couple days to see if culture comes back positive or not 
4